# Patient Record
Sex: MALE | Race: WHITE | Employment: OTHER | URBAN - METROPOLITAN AREA
[De-identification: names, ages, dates, MRNs, and addresses within clinical notes are randomized per-mention and may not be internally consistent; named-entity substitution may affect disease eponyms.]

---

## 2017-02-03 ENCOUNTER — ANTICOAGULATION THERAPY VISIT (OUTPATIENT)
Dept: NURSING | Facility: CLINIC | Age: 52
End: 2017-02-03
Payer: COMMERCIAL

## 2017-02-03 DIAGNOSIS — Z79.01 LONG-TERM (CURRENT) USE OF ANTICOAGULANTS: Primary | ICD-10-CM

## 2017-02-03 LAB — INR POINT OF CARE: 2.5 (ref 0.86–1.14)

## 2017-02-03 PROCEDURE — 85610 PROTHROMBIN TIME: CPT | Mod: QW

## 2017-02-03 PROCEDURE — 99207 ZZC NO CHARGE NURSE ONLY: CPT

## 2017-02-03 PROCEDURE — 36416 COLLJ CAPILLARY BLOOD SPEC: CPT

## 2017-03-17 ENCOUNTER — ANTICOAGULATION THERAPY VISIT (OUTPATIENT)
Dept: NURSING | Facility: CLINIC | Age: 52
End: 2017-03-17
Payer: COMMERCIAL

## 2017-03-17 DIAGNOSIS — Z79.01 LONG-TERM (CURRENT) USE OF ANTICOAGULANTS: ICD-10-CM

## 2017-03-17 LAB — INR POINT OF CARE: 2.8 (ref 0.86–1.14)

## 2017-03-17 PROCEDURE — 36416 COLLJ CAPILLARY BLOOD SPEC: CPT

## 2017-03-17 PROCEDURE — 85610 PROTHROMBIN TIME: CPT | Mod: QW

## 2017-03-17 NOTE — PROGRESS NOTES
ANTICOAGULATION FOLLOW-UP CLINIC VISIT    Patient Name:  Barry Herrera  Date:  3/17/2017  Contact Type:  Face to Face    SUBJECTIVE:     Patient Findings     Positives No Problem Findings           OBJECTIVE    INR Protime   Date Value Ref Range Status   03/17/2017 2.8 (A) 0.86 - 1.14 Final       ASSESSMENT / PLAN  INR assessment THER    Recheck INR In: 6 WEEKS    INR Location Clinic      Anticoagulation Summary as of 3/17/2017     INR goal 2.0-3.0   Today's INR 2.8   Maintenance plan 10 mg (5 mg x 2) on Mon; 7.5 mg (5 mg x 1.5) all other days   Full instructions 10 mg on Mon; 7.5 mg all other days   Weekly total 55 mg   No change documented Lois Claudio RN   Plan last modified Lois Claudio RN (9/7/2016)   Next INR check 4/28/2017   Target end date     Indications   Long-term (current) use of anticoagulants [Z79.01] [Z79.01]  DVT (deep venous thrombosis) (H) (Resolved) [I82.409]         Anticoagulation Episode Summary     INR check location [facility]    Preferred lab     Send INR reminders to [facility]    Comments       Anticoagulation Care Providers     Provider Role Specialty Phone number    Josette Sarmiento MD  Family Practice [phone]            See the Encounter Report to view Anticoagulation Flowsheet and Dosing Calendar (Go to Encounters tab in chart review, and find the Anticoagulation Therapy Visit)    Dosage adjustment made based on physician directed care plan.    2.8 today.  Continue with 10 mg on Mon;  7.5 mg all other days.  Recheck in 6 weeks.     Lois Claudio RN

## 2017-03-17 NOTE — MR AVS SNAPSHOT
Barry BARRERA Javier   3/17/2017 8:15 AM   Anticoagulation Therapy Visit    Description:  51 year old male   Provider:   ANTICOAGULATION CLINIC   Department:  Ec Nurse           INR as of 3/17/2017     Today's INR 2.8      Anticoagulation Summary as of 3/17/2017     INR goal 2.0-3.0   Today's INR 2.8   Full instructions 10 mg on Mon; 7.5 mg all other days   Next INR check 4/28/2017    Indications   Long-term (current) use of anticoagulants [Z79.01] [Z79.01]  DVT (deep venous thrombosis) (H) (Resolved) [I82.409]         Description     2.8 today.  Continue with 10 mg on Mon;  7.5 mg all other days.  Recheck in 6 weeks.         Your next Anticoagulation Clinic appointment(s)     Apr 28, 2017  8:15 AM CDT   Anticoagulation Visit with  ANTICOAGULATION CLINIC   [facility] ([facility])    [address] [place] [id number] [phone]              Contact Numbers     Clinic Number:         March 2017 Details    Sun Mon Tue Wed Thu Fri Sat        1               2               3               4                 5               6               7               8               9               10               11                 12               13               14               15               16               17      7.5 mg   See details      18      7.5 mg           19      7.5 mg         20      10 mg         21      7.5 mg         22      7.5 mg         23      7.5 mg         24      7.5 mg         25      7.5 mg           26      7.5 mg         27      10 mg         28      7.5 mg         29      7.5 mg         30      7.5 mg         31      7.5 mg           Date Details   03/17 This INR check               How to take your warfarin dose     To take:  7.5 mg Take 1.5 of the 5 mg tablets.    To take:  10 mg Take 2 of the 5 mg tablets.           April 2017 Details    Sun Mon Tue Wed Thu Fri Sat           1      7.5 mg           2      7.5 mg         3      10  mg         4      7.5 mg         5      7.5 mg         6      7.5 mg         7      7.5 mg         8      7.5 mg           9      7.5 mg         10      10 mg         11      7.5 mg         12      7.5 mg         13      7.5 mg         14      7.5 mg         15      7.5 mg           16      7.5 mg         17      10 mg         18      7.5 mg         19      7.5 mg         20      7.5 mg         21      7.5 mg         22      7.5 mg           23      7.5 mg         24      10 mg         25      7.5 mg         26      7.5 mg         27      7.5 mg         28            29                 30                      Date Details   No additional details    Date of next INR:  4/28/2017         How to take your warfarin dose     To take:  7.5 mg Take 1.5 of the 5 mg tablets.    To take:  10 mg Take 2 of the 5 mg tablets.

## 2017-04-18 DIAGNOSIS — I82.492 DEEP VEIN THROMBOSIS (DVT) OF OTHER VEIN OF LEFT LOWER EXTREMITY: ICD-10-CM

## 2017-04-18 DIAGNOSIS — Z79.01 LONG-TERM (CURRENT) USE OF ANTICOAGULANTS: ICD-10-CM

## 2017-04-18 RX ORDER — WARFARIN SODIUM 5 MG/1
TABLET ORAL
Qty: 168 TABLET | Refills: 0 | Status: SHIPPED | OUTPATIENT
Start: 2017-04-18 | End: 2017-07-18

## 2017-04-18 NOTE — TELEPHONE ENCOUNTER
JANTOVEN 5 MG    Last Written Prescription Date: 7/27/16  Last Fill Qty: 168, # refills: 0  Last Office Visit with [facility] or [facility] prescribing provider: 7/27/16       Date and Result of Last PT/INR:   Lab Results   Component Value Date    INR 2.8 03/17/2017    INR 2.5 02/03/2017    INR 0.96 02/01/2011    INR 0.99 01/31/2011

## 2017-04-27 ENCOUNTER — OFFICE VISIT (OUTPATIENT)
Dept: FAMILY MEDICINE | Facility: CLINIC | Age: 52
End: 2017-04-27
Payer: COMMERCIAL

## 2017-04-27 VITALS
HEART RATE: 95 BPM | OXYGEN SATURATION: 97 % | TEMPERATURE: 99.6 F | HEIGHT: 72 IN | WEIGHT: 211.4 LBS | BODY MASS INDEX: 28.63 KG/M2 | DIASTOLIC BLOOD PRESSURE: 78 MMHG | SYSTOLIC BLOOD PRESSURE: 112 MMHG

## 2017-04-27 DIAGNOSIS — R05.9 COUGH: ICD-10-CM

## 2017-04-27 DIAGNOSIS — J02.9 SORE THROAT: Primary | ICD-10-CM

## 2017-04-27 LAB
DEPRECATED S PYO AG THROAT QL EIA: NORMAL
MICRO REPORT STATUS: NORMAL
SPECIMEN SOURCE: NORMAL

## 2017-04-27 PROCEDURE — 87880 STREP A ASSAY W/OPTIC: CPT | Performed by: FAMILY MEDICINE

## 2017-04-27 PROCEDURE — 87081 CULTURE SCREEN ONLY: CPT | Performed by: FAMILY MEDICINE

## 2017-04-27 PROCEDURE — 99213 OFFICE O/P EST LOW 20 MIN: CPT | Performed by: FAMILY MEDICINE

## 2017-04-27 RX ORDER — BENZONATATE 200 MG/1
200 CAPSULE ORAL 3 TIMES DAILY PRN
Qty: 21 CAPSULE | Refills: 0 | Status: SHIPPED | OUTPATIENT
Start: 2017-04-27 | End: 2017-05-10

## 2017-04-27 NOTE — MR AVS SNAPSHOT
After Visit Summary   4/27/2017    Barry Herrera    MRN: [id number]           Patient Information     Date Of Birth          1965        Visit Information        Provider Department      4/27/2017 11:00 AM Josette Sarmiento MD [facility]        Today's Diagnoses     Sore throat    -  1    Cough           Follow-ups after your visit        Your next 10 appointments already scheduled     Apr 28, 2017  8:15 AM CDT   Anticoagulation Visit with [facility]UL[facility] CLINIC   [facility] ([facility])    [address] [place] [id number]   [phone]              Who to contact     If you have questions or need follow up information about today's clinic visit or your schedule please contact [facility] directly at [phone].  Normal or non-critical lab and imaging results will be communicated to you by MyChart, letter or phone within 4 business days after the clinic has received the results. If you do not hear from us within 7 days, please contact the clinic through MyChart or phone. If you have a critical or abnormal lab result, we will notify you by phone as soon as possible.  Submit refill requests through [email or web address] or call your pharmacy and they will forward the refill request to us. Please allow 3 business days for your refill to be completed.          Additional Information About Your Visit        MyChart Information     [email or web address] gives you secure access to your electronic health record. If you see a primary care provider, you can also send messages to your care team and make appointments. If you have questions, please call your primary care clinic.  If you do not have a primary care provider, please call [phone] and they will assist you.        Care EveryWhere ID     This is your Care EveryWhere ID. This could be used by other organizations to access your [facility] medical records  [id number]         Your Vitals Were     Pulse Temperature Height Pulse Oximetry BMI (Body Mass Index)       95 99.6  F (37.6  C) (Tympanic) 6' (1.829 m) 97% 28.67 kg/m2        Blood Pressure from Last 3 Encounters:   04/27/17 112/78   07/27/16 120/74   07/22/15 106/66    Weight from Last 3 Encounters:   04/27/17 211 lb 6.4 oz (95.9 kg)   07/27/16 206 lb (93.4 kg)   07/22/15 207 lb (93.9 kg)              We Performed the Following     Beta strep group A culture     Rapid strep screen          Today's Medication Changes          These changes are accurate as of: 4/27/17 11:35 AM.  If you have any questions, ask your nurse or doctor.               Start taking these medicines.        Dose/Directions    benzonatate 200 MG capsule   Commonly known as:  TESSALON   Used for:  Cough   Started by:  Josette Sarmiento MD        Dose:  200 mg   Take 1 capsule (200 mg) by mouth 3 times daily as needed for cough   Quantity:  21 capsule   Refills:  0            Where to get your medicines      These medications were sent to [id number] - [place] - [address] AT [facility] OF [facility] [address]  [address] [facility] [address]     Phone:  [phone]     benzonatate 200 MG capsule                Primary Care Provider Office Phone # Fax #    Josette Sarmiento [phone]       [facility] [address]  [place] [address]        Thank you!     Thank you for choosing [facility]  for your care. Our goal is always to provide you with excellent care. Hearing back from our patients is one way we can continue to improve our services. Please take a few minutes to complete the written survey that you may receive in the mail after your visit with us. Thank you!             Your Updated Medication List - Protect others around you: Learn how to safely use, store and throw away your medicines at www.disposemymeds.org.          This list is accurate as  of: 4/27/17 11:35 AM.  Always use your most recent med list.                   Brand Name Dispense Instructions for use    benzonatate 200 MG capsule    TESSALON    21 capsule    Take 1 capsule (200 mg) by mouth 3 times daily as needed for cough       JANTOVEN 5 MG tablet   Generic drug:  warfarin     168 tablet    TAKE 2 TABLETS BY MOUTH ON MONDAYS AND THURSDAYS, AND TAKE 1 1/2 TABLETS ALL OTHER DAYS AS DIRECTED

## 2017-04-27 NOTE — PROGRESS NOTES
SUBJECTIVE:                                                    Barry Herrera is a 51 year old male who presents to clinic today for the following health issues:      Acute Illness   Acute illness concerns: Cold sx's  Onset: 4 days    Fever: YES- 100-102    Chills/Sweats: YES    Headache (location?): YES    Sinus Pressure:YES    Conjunctivitis:  no    Ear Pain: no    Rhinorrhea: no     Congestion: YES    Sore Throat: YES     Cough: YES    Wheeze: no     Decreased Appetite: YES    Nausea: no    Vomiting: no    Diarrhea:  no    Dysuria/Freq.: no    Fatigue/Achiness: YES    Sick/Strep Exposure: no      Therapies Tried and outcome: OTC meds        Problem list and histories reviewed & adjusted, as indicated.  Additional history: as documented    Patient Active Problem List   Diagnosis     Diverticulosis of large intestine     HORSESHOE KIDNEY     Premature ejaculation     CARDIOVASCULAR SCREENING; LDL GOAL LESS THAN 160     Health Care Home     Long-term (current) use of anticoagulants [Z79.01]     Deep vein thrombosis (DVT) of other vein of left lower extremity (H)     Past Surgical History:   Procedure Laterality Date     C APPENDECTOMY  7/77     HC DUPLEX EXTREMITY VENOUS, LIMITED UNILATERAL  3/2009    left occlusive thrombus groin to calf involving superficial femoral vein, popliteal veins as well as proximal peroneal and post tibial calf veins       Social History   Substance Use Topics     Smoking status: Never Smoker     Smokeless tobacco: Never Used     Alcohol use No      Comment: approx 2 drinks a month     Family History   Problem Relation Age of Onset     Blood Disease Brother      Factor V Leiden neg     Cardiovascular Brother      two DVT     Breast Cancer Mother      Aneurysm Father      brain - stroke         Current Outpatient Prescriptions   Medication Sig Dispense Refill     benzonatate (TESSALON) 200 MG capsule Take 1 capsule (200 mg) by mouth 3 times daily as needed for cough 21 capsule 0      JANTOVEN 5 MG tablet TAKE 2 TABLETS BY MOUTH ON MONDAYS AND THURSDAYS, AND TAKE 1 1/2 TABLETS ALL OTHER DAYS AS DIRECTED 168 tablet 0     Allergies   Allergen Reactions     No Known Drug Allergies        Reviewed and updated as needed this visit by clinical staff       Reviewed and updated as needed this visit by Provider         ROS:  INTEGUMENTARY/SKIN: NEGATIVE for worrisome rashes, moles or lesions  GI: NEGATIVE for nausea, abdominal pain, heartburn, or change in bowel habits    OBJECTIVE:                                                    /78 (BP Location: Right arm, Cuff Size: Adult Regular)  Pulse 95  Temp 99.6  F (37.6  C) (Tympanic)  Ht 6' (1.829 m)  Wt 211 lb 6.4 oz (95.9 kg)  SpO2 97%  BMI 28.67 kg/m2  Body mass index is 28.67 kg/(m^2).   GENERAL: healthy, alert, well nourished, well hydrated, no distress  HENT: ear canals- normal; TMs- normal; Nose- normal; Mouth- no ulcers, no lesions  NECK: no tenderness, no adenopathy, no asymmetry, no masses, no stiffness; thyroid- normal to palpation  RESP: lungs clear to auscultation - no rales, no rhonchi, no wheezes  CV: regular rates and rhythm, normal S1 S2, no S3 or S4 and no murmur, no click or rub -  ABDOMEN: soft, no tenderness, no  hepatosplenomegaly, no masses, normal bowel sounds    Results for orders placed or performed in visit on 04/27/17   Rapid strep screen   Result Value Ref Range    Specimen Description Throat     Rapid Strep A Screen       NEGATIVE: No Group A streptococcal antigen detected by immunoassay, await   culture report.      Micro Report Status FINAL 04/27/2017           ASSESSMENT/PLAN:                                                        ICD-10-CM    1. Sore throat J02.9 Rapid strep screen     Beta strep group A culture   2. Cough R05 benzonatate (TESSALON) 200 MG capsule     Rapid strep screening is negative. No signs of bacterial infection. Recommended to start antihistamine daily and Mucinex over-the-counter. If any  symptomatic worsening noted, chart to notify me back. Stay well hydrated. Tylenol prn fever or pain. Cough medication ordered  Follow up with Provider - as needed     Josette Sarmiento MD  [facility]

## 2017-04-27 NOTE — NURSING NOTE
Chief Complaint   Patient presents with     URI       Initial /78 (BP Location: Right arm, Cuff Size: Adult Regular)  Pulse 95  Temp 99.6  F (37.6  C) (Tympanic)  Ht 6' (1.829 m)  Wt 211 lb 6.4 oz (95.9 kg)  SpO2 97%  BMI 28.67 kg/m2 Estimated body mass index is 28.67 kg/(m^2) as calculated from the following:    Height as of this encounter: 6' (1.829 m).    Weight as of this encounter: 211 lb 6.4 oz (95.9 kg).  Medication Reconciliation: complete

## 2017-04-28 ENCOUNTER — ANTICOAGULATION THERAPY VISIT (OUTPATIENT)
Dept: NURSING | Facility: CLINIC | Age: 52
End: 2017-04-28
Payer: COMMERCIAL

## 2017-04-28 DIAGNOSIS — Z79.01 LONG-TERM (CURRENT) USE OF ANTICOAGULANTS: ICD-10-CM

## 2017-04-28 LAB
BACTERIA SPEC CULT: NORMAL
INR POINT OF CARE: 4.4 (ref 0.86–1.14)
MICRO REPORT STATUS: NORMAL
SPECIMEN SOURCE: NORMAL

## 2017-04-28 PROCEDURE — 85610 PROTHROMBIN TIME: CPT | Mod: QW

## 2017-04-28 PROCEDURE — 36416 COLLJ CAPILLARY BLOOD SPEC: CPT

## 2017-04-28 NOTE — PROGRESS NOTES
ANTICOAGULATION FOLLOW-UP CLINIC VISIT    Patient Name:  Barry Herrera  Date:  4/28/2017  Contact Type:  Face to Face    SUBJECTIVE:     Patient Findings     Positives Antibiotic use or infection    Comments Cold and infection            OBJECTIVE    INR Protime   Date Value Ref Range Status   04/28/2017 4.4 (A) 0.86 - 1.14 Final       ASSESSMENT / PLAN  INR assessment SUPRA    Recheck INR In: 2 WEEKS    INR Location Clinic      Anticoagulation Summary as of 4/28/2017     INR goal 2.0-3.0   Today's INR 4.4!   Maintenance plan 10 mg (5 mg x 2) on Mon; 7.5 mg (5 mg x 1.5) all other days   Full instructions 4/28: Hold; Otherwise 10 mg on Mon; 7.5 mg all other days   Weekly total 55 mg   Plan last modified Lois Claudio RN (9/7/2016)   Next INR check 5/12/2017   Target end date     Indications   Long-term (current) use of anticoagulants [Z79.01] [Z79.01]  DVT (deep venous thrombosis) (H) (Resolved) [I82.409]         Anticoagulation Episode Summary     INR check location [facility]    Preferred lab     Send INR reminders to [facility]    Comments       Anticoagulation Care Providers     Provider Role Specialty Phone number    Josette Sarmiento MD  Family Practice [phone]            See the Encounter Report to view Anticoagulation Flowsheet and Dosing Calendar (Go to Encounters tab in chart review, and find the Anticoagulation Therapy Visit)    Dosage adjustment made based on physician directed care plan.    4.4 today due to cold and infection plus not eating well the past week.  Advised to hold today 4/28/17, resume regular diet.  Otherwise continue with 10 mg on Mon;  7.5 mg all other days.  Recheck in 2 weeks.     Lois Claudio RN

## 2017-04-28 NOTE — MR AVS SNAPSHOT
Barry BRUCE Herrera   4/28/2017 8:15 AM   Anticoagulation Therapy Visit    Description:  51 year old male   Provider:   ANTICOAGULATION CLINIC   Department:  Ec Nurse           INR as of 4/28/2017     Today's INR 4.4!      Anticoagulation Summary as of 4/28/2017     INR goal 2.0-3.0   Today's INR 4.4!   Full instructions 4/28: Hold; Otherwise 10 mg on Mon; 7.5 mg all other days   Next INR check 5/12/2017    Indications   Long-term (current) use of anticoagulants [Z79.01] [Z79.01]  DVT (deep venous thrombosis) (H) (Resolved) [I82.409]         Description     4.4 today due to cold and infection plus not eating well the past week.  Advised to hold today 4/28/17, resume regular diet.  Otherwise continue with 10 mg on Mon;  7.5 mg all other days.  Recheck in 2 weeks.         Your next Anticoagulation Clinic appointment(s)     May 12, 2017  8:15 AM CDT   Anticoagulation Visit with  ANTICOAGULATION CLINIC   [facility] [place] [address]   [phone]              Contact Numbers     Clinic Number:         April 2017 Details    Sun Mon Tue Wed Thu Fri Sat           1                 2               3               4               5               6               7               8                 9               10               11               12               13               14               15                 16               17               18               19               20               21               22                 23               24               25               26               27               28      Hold   See details      29      7.5 mg           30      7.5 mg                Date Details   04/28 This INR check               How to take your warfarin dose     To take:  7.5 mg Take 1.5 of the 5 mg tablets.    Hold Do not take your warfarin dose. See the Details table to the right for additional instructions.                 May 2017 Details    Sun Mon Tue Wed Thu Fri Sat      1      10 mg         2      7.5 mg         3      7.5 mg         4      7.5 mg         5      7.5 mg         6      7.5 mg           7      7.5 mg         8      10 mg         9      7.5 mg         10      7.5 mg         11      7.5 mg         12            13                 14               15               16               17               18               19               20                 21               22               23               24               25               26               27                 28               29               30               31                   Date Details   No additional details    Date of next INR:  5/12/2017         How to take your warfarin dose     To take:  7.5 mg Take 1.5 of the 5 mg tablets.    To take:  10 mg Take 2 of the 5 mg tablets.

## 2017-05-10 ENCOUNTER — OFFICE VISIT (OUTPATIENT)
Dept: FAMILY MEDICINE | Facility: CLINIC | Age: 52
End: 2017-05-10
Payer: COMMERCIAL

## 2017-05-10 VITALS
BODY MASS INDEX: 27.5 KG/M2 | TEMPERATURE: 98 F | HEART RATE: 98 BPM | DIASTOLIC BLOOD PRESSURE: 70 MMHG | HEIGHT: 72 IN | SYSTOLIC BLOOD PRESSURE: 122 MMHG | WEIGHT: 203 LBS | OXYGEN SATURATION: 95 %

## 2017-05-10 DIAGNOSIS — J01.90 ACUTE SINUSITIS WITH SYMPTOMS > 10 DAYS: Primary | ICD-10-CM

## 2017-05-10 PROCEDURE — 99213 OFFICE O/P EST LOW 20 MIN: CPT | Performed by: FAMILY MEDICINE

## 2017-05-10 NOTE — NURSING NOTE
Chief Complaint   Patient presents with     Cough       Initial /70 (BP Location: Right arm, Patient Position: Chair, Cuff Size: Adult Large)  Pulse 98  Temp 98  F (36.7  C) (Tympanic)  Ht 6' (1.829 m)  Wt 203 lb (92.1 kg)  SpO2 95%  BMI 27.53 kg/m2 Estimated body mass index is 27.53 kg/(m^2) as calculated from the following:    Height as of this encounter: 6' (1.829 m).    Weight as of this encounter: 203 lb (92.1 kg).  Medication Reconciliation: complete     Violetta Topete CMA

## 2017-05-10 NOTE — MR AVS SNAPSHOT
After Visit Summary   5/10/2017    Barry Herrera    MRN: [id number]           Patient Information     Date Of Birth          1965        Visit Information        Provider Department      5/10/2017 11:00 AM Josette Sarmiento MD [facility]        Today's Diagnoses     Acute sinusitis with symptoms > 10 days    -  1       Follow-ups after your visit        Your next 10 appointments already scheduled     May 12, 2017  8:15 AM CDT   Anticoagulation Visit with [facility]ICOAGULATION CLINIC   [facility]    [address] [place] [address]   [phone]              Who to contact     If you have questions or need follow up information about today's clinic visit or your schedule please contact [facility] directly at [phone].  Normal or non-critical lab and imaging results will be communicated to you by MyChart, letter or phone within 4 business days after the clinic has received the results. If you do not hear from us within 7 days, please contact the clinic through MyChart or phone. If you have a critical or abnormal lab result, we will notify you by phone as soon as possible.  Submit refill requests through t-Art or call your pharmacy and they will forward the refill request to us. Please allow 3 business days for your refill to be completed.          Additional Information About Your Visit        MyChart Information     [employer] gives you secure access to your electronic health record. If you see a primary care provider, you can also send messages to your care team and make appointments. If you have questions, please call your primary care clinic.  If you do not have a primary care provider, please call [phone] and they will assist you.        Care EveryWhere ID     This is your Care EveryWhere ID. This could be used by other organizations to access your [facility]  records  [id number]        Your Vitals Were     Pulse Temperature Height Pulse Oximetry BMI (Body Mass Index)       98 98  F (36.7  C) (Tympanic) 6' (1.829 m) 95% 27.53 kg/m2        Blood Pressure from Last 3 Encounters:   05/10/17 122/70   04/27/17 112/78   07/27/16 120/74    Weight from Last 3 Encounters:   05/10/17 203 lb (92.1 kg)   04/27/17 211 lb 6.4 oz (95.9 kg)   07/27/16 206 lb (93.4 kg)              Today, you had the following     No orders found for display         Today's Medication Changes          These changes are accurate as of: 5/10/17 11:14 AM.  If you have any questions, ask your nurse or doctor.               Start taking these medicines.        Dose/Directions    amoxicillin-clavulanate 875-125 MG per tablet   Commonly known as:  AUGMENTIN   Used for:  Acute sinusitis with symptoms > 10 days   Started by:  Josette Sarmiento MD        Dose:  1 tablet   Take 1 tablet by mouth 2 times daily for 7 days   Quantity:  14 tablet   Refills:  0            Where to get your medicines      These medications were sent to [id number] - [place] - [address] AT [facility] OF [facility] [address]  [address] [facility] [place], [place] [address]     Phone:  [phone]     amoxicillin-clavulanate 875-125 MG per tablet                Primary Care Provider Office Phone # Fax #    Josette Sarmiento [phone]       [facility] [place] [address] [place] [address] [address]  [place] [address]        Thank you!     [facility] [place]  [facility] your [facility]. Our goal is always to provide you with excellent care. Hearing back from our patients is one way we can continue to improve our services. Please take a few minutes to complete the written survey that you may receive in the mail after your visit with us. Thank you!             Your Updated Medication List - Protect others around you: Learn how to safely use, store and throw away your medicines at  www.disposemymeds.org.          This list is accurate as of: 5/10/17 11:14 AM.  Always use your most recent med list.                   Brand Name Dispense Instructions for use    amoxicillin-clavulanate 875-125 MG per tablet    AUGMENTIN    14 tablet    Take 1 tablet by mouth 2 times daily for 7 days       JANTOVEN 5 MG tablet   Generic drug:  warfarin     168 tablet    TAKE 2 TABLETS BY MOUTH ON MONDAYS AND THURSDAYS, AND TAKE 1 1/2 TABLETS ALL OTHER DAYS AS DIRECTED

## 2017-05-10 NOTE — PROGRESS NOTES
SUBJECTIVE:                                                    Barry Herrera is a 51 year old male who presents to clinic today for the following health issues:      Acute Illness   Acute illness concerns: URI  Onset: x 2 weeks    Fever: YES- 100.5    Chills/Sweats: YES    Headache (location?): YES    Sinus Pressure:YES    Conjunctivitis:  no    Ear Pain: no    Rhinorrhea: YES    Congestion: no    Sore Throat: no     Cough: YES-non-productive, productive of yellow sputum, productive of green sputum    Wheeze: no    Decreased Appetite: no    Nausea: no    Vomiting: no    Diarrhea:  no    Dysuria/Freq.: no    Fatigue/Achiness: YES    Sick/Strep Exposure: no     Therapies Tried and outcome: Tylenol used this morning.           Problem list and histories reviewed & adjusted, as indicated.  Additional history: as documented    Patient Active Problem List   Diagnosis     Diverticulosis of large intestine     HORSESHOE KIDNEY     Premature ejaculation     CARDIOVASCULAR SCREENING; LDL GOAL LESS THAN 160     Health Care Home     Long-term (current) use of anticoagulants [Z79.01]     Deep vein thrombosis (DVT) of other vein of left lower extremity (H)     Past Surgical History:   Procedure Laterality Date     C APPENDECTOMY  7/77     HC DUPLEX EXTREMITY VENOUS, LIMITED UNILATERAL  3/2009    left occlusive thrombus groin to calf involving superficial femoral vein, popliteal veins as well as proximal peroneal and post tibial calf veins       Social History   Substance Use Topics     Smoking status: Never Smoker     Smokeless tobacco: Never Used     Alcohol use No      Comment: approx 2 drinks a month     Family History   Problem Relation Age of Onset     Blood Disease Brother      Factor V Leiden neg     Cardiovascular Brother      two DVT     Breast Cancer Mother      Aneurysm Father      brain - stroke         Current Outpatient Prescriptions   Medication Sig Dispense Refill     amoxicillin-clavulanate (AUGMENTIN)  875-125 MG per tablet Take 1 tablet by mouth 2 times daily for 7 days 14 tablet 0     JANTOVEN 5 MG tablet TAKE 2 TABLETS BY MOUTH ON MONDAYS AND THURSDAYS, AND TAKE 1 1/2 TABLETS ALL OTHER DAYS AS DIRECTED 168 tablet 0     Allergies   Allergen Reactions     No Known Drug Allergies        Reviewed and updated as needed this visit by clinical staff       Reviewed and updated as needed this visit by Provider         ROS:  INTEGUMENTARY/SKIN: NEGATIVE for worrisome rashes, moles or lesions  GI: NEGATIVE for nausea, abdominal pain, heartburn, or change in bowel habits    OBJECTIVE:                                                    /70 (BP Location: Right arm, Patient Position: Chair, Cuff Size: Adult Large)  Pulse 98  Temp 98  F (36.7  C) (Tympanic)  Ht 6' (1.829 m)  Wt 203 lb (92.1 kg)  SpO2 95%  BMI 27.53 kg/m2  Body mass index is 27.53 kg/(m^2).   GENERAL: healthy, alert, well nourished, well hydrated, no distress  HENT: ear canals- normal; TMs- normal; Nose- congested b/l. Sinus tenderness on the left. ; Mouth- no ulcers, no lesions  NECK: no tenderness, no adenopathy, no asymmetry, no masses, no stiffness; thyroid- normal to palpation  RESP: lungs clear to auscultation - no rales, no rhonchi, no wheezes  CV: regular rates and rhythm, normal S1 S2, no S3 or S4 and no murmur, no click or rub -         ASSESSMENT/PLAN:                                                        ICD-10-CM    1. Acute sinusitis with symptoms > 10 days J01.90 amoxicillin-clavulanate (AUGMENTIN) 875-125 MG per tablet     Starting the patient on Augmentin.   Recommended to stay well hydrated.   May also use OTC cough medication and anti-histamine.  Follow up with Provider - if not improving in 5-7 days.      Josette Sarmiento MD  [facility] [place]

## 2017-05-12 ENCOUNTER — ANTICOAGULATION THERAPY VISIT (OUTPATIENT)
Dept: NURSING | Facility: CLINIC | Age: 52
End: 2017-05-12
Payer: COMMERCIAL

## 2017-05-12 DIAGNOSIS — Z79.01 LONG-TERM (CURRENT) USE OF ANTICOAGULANTS: ICD-10-CM

## 2017-05-12 LAB — INR POINT OF CARE: 5.4 (ref 0.86–1.14)

## 2017-05-12 PROCEDURE — 85610 PROTHROMBIN TIME: CPT | Mod: QW

## 2017-05-12 PROCEDURE — 36416 COLLJ CAPILLARY BLOOD SPEC: CPT

## 2017-05-12 NOTE — PROGRESS NOTES
ANTICOAGULATION FOLLOW-UP CLINIC VISIT    Patient Name:  Barry Herrera  Date:  5/12/2017  Contact Type:  Face to Face    SUBJECTIVE:     Patient Findings     Positives Antibiotic use or infection    Comments Cold, sinus infection and on abx            OBJECTIVE    INR Protime   Date Value Ref Range Status   05/12/2017 5.4 (A) 0.86 - 1.14 Final       ASSESSMENT / PLAN  INR assessment SUPRA    Recheck INR In: 5 DAYS    INR Location Clinic      Anticoagulation Summary as of 5/12/2017     INR goal 2.0-3.0   Today's INR 5.4!   Maintenance plan 10 mg (5 mg x 2) on Mon; 7.5 mg (5 mg x 1.5) all other days   Full instructions 5/12: Hold; 5/13: 5 mg; 5/15: 7.5 mg; Otherwise 10 mg on Mon; 7.5 mg all other days   Weekly total 55 mg   Plan last modified Lois Claudio RN (9/7/2016)   Next INR check 5/18/2017   Target end date     Indications   Long-term (current) use of anticoagulants [Z79.01] [Z79.01]  DVT (deep venous thrombosis) (H) (Resolved) [I82.409]         Anticoagulation Episode Summary     [facility]    Preferred lab     Send INR reminders to [facility]    Comments       Anticoagulation Care Providers     Provider Role Specialty Phone number    Josette Sarmiento MD  Family Practice [phone]            See the Encounter Report to view Anticoagulation Flowsheet and Dosing Calendar (Go to Encounters tab in chart review, and find the Anticoagulation Therapy Visit)    Dosage adjustment made based on physician directed care plan.    5.4 today.  Hold today.  Take 5 mg on Sat;  7.5 all other days.  Recheck in 1 week.     Lois Claudio, COTY

## 2017-05-12 NOTE — MR AVS SNAPSHOT
Barry BARRERA Javier   5/12/2017 8:15 AM   Anticoagulation Therapy Visit    Description:  51 year old male   Provider:   ANTICOAGULATION CLINIC   Department:  Ec Nurse           INR as of 5/12/2017     Today's INR 5.4!      Anticoagulation Summary as of 5/12/2017     INR goal 2.0-3.0   Today's INR 5.4!   Full instructions 5/12: Hold; 5/13: 5 mg; 5/15: 7.5 mg; Otherwise 10 mg on Mon; 7.5 mg all other days   Next INR check 5/18/2017    Indications   Long-term (current) use of anticoagulants [Z79.01] [Z79.01]  DVT (deep venous thrombosis) (H) (Resolved) [I82.409]         Description     5.4 today.  Hold today.  Take 5 mg on Sat;  7.5 all other days.  Recheck in 1 week.         Your next Anticoagulation Clinic appointment(s)     May 18, 2017  8:15 AM CDT   [facility]ticoagulation Visit with [facility]   [facility] ([facility]    [address] [place] [id number]   [phone]              Contact Numbers     Clinic Number:         May 2017 Details    Sun Mon Tue Wed Thu Fri Sat      1               2               3               4               5               6                 7               8               9               10               11               12      Hold   See details      13      5 mg           14      7.5 mg         15      7.5 mg         16      7.5 mg         17      7.5 mg         18            19               20                 21               22               23               24               25               26               27                 28               29               30               31                   Date Details   05/12 This INR check       Date of next INR:  5/18/2017         How to take your warfarin dose     To take:  5 mg Take 1 of the 5 mg tablets.    To take:  7.5 mg Take 1.5 of the 5 mg tablets.    Hold Do not take your warfarin dose. See the Details table to the right for additional instructions.

## 2017-05-18 ENCOUNTER — ANTICOAGULATION THERAPY VISIT (OUTPATIENT)
Dept: NURSING | Facility: CLINIC | Age: 52
End: 2017-05-18
Payer: COMMERCIAL

## 2017-05-18 DIAGNOSIS — Z79.01 LONG-TERM (CURRENT) USE OF ANTICOAGULANTS: ICD-10-CM

## 2017-05-18 LAB — INR POINT OF CARE: 2.6 (ref 0.86–1.14)

## 2017-05-18 PROCEDURE — 85610 PROTHROMBIN TIME: CPT | Mod: QW

## 2017-05-18 PROCEDURE — 36416 COLLJ CAPILLARY BLOOD SPEC: CPT

## 2017-05-18 NOTE — MR AVS SNAPSHOT
Barry Herrera   5/18/2017 8:15 AM   Anticoagulation Therapy Visit    Description:  51 year old male   Provider:   ANTICOAGULATION CLINIC   Department:  Ec Nurse           INR as of 5/18/2017     Today's INR 2.6      Anticoagulation Summary as of 5/18/2017     INR goal 2.0-3.0   Today's INR 2.6   Full instructions 5/19: 5 mg; 5/21: 5 mg; 5/22: 5 mg; 5/24: 5 mg; Otherwise 10 mg on Mon; 7.5 mg all other days   Next INR check 5/25/2017    Indications   Long-term (current) use of anticoagulants [Z79.01] [Z79.01]  DVT (deep venous thrombosis) (H) (Resolved) [I82.409]         Your next Anticoagulation Clinic appointment(s)     May 25, 2017  8:15 AM CDT   [facility]   [facility]    [address] [place] [id number] [phone]              Contact Numbers     Clinic Number:         May 2017 Details    Sun Mon Tue Wed Thu Fri Sat      1               2               3               4               5               6                 7               8               9               10               11               12               13                 14               15               16               17               18      7.5 mg   See details      19      5 mg         20      7.5 mg           21      5 mg         22      5 mg         23      7.5 mg         24      5 mg         25            26               27                 28               29               30               31                   Date Details   05/18 This INR check       Date of next INR:  5/25/2017         How to take your warfarin dose     To take:  5 mg Take 1 of the 5 mg tablets.    To take:  7.5 mg Take 1.5 of the 5 mg tablets.

## 2017-05-18 NOTE — PROGRESS NOTES
ANTICOAGULATION FOLLOW-UP CLINIC VISIT    Patient Name:  Barry Herrera  Date:  5/18/2017  Contact Type:  Face to Face    SUBJECTIVE:     Patient Findings     Positives Antibiotic use or infection, Intentional hold of therapy    Comments Last dose Amoxicillin 5/17           OBJECTIVE    INR Protime   Date Value Ref Range Status   05/18/2017 2.6 (A) 0.86 - 1.14 Final       ASSESSMENT / PLAN  INR assessment THER    Recheck INR In: 1 WEEK    INR Location Clinic      Anticoagulation Summary as of 5/18/2017     INR goal 2.0-3.0   Today's INR 2.6   Maintenance plan 10 mg (5 mg x 2) on Mon; 7.5 mg (5 mg x 1.5) all other days   Full instructions 5/19: 5 mg; 5/21: 5 mg; 5/22: 5 mg; 5/24: 5 mg; Otherwise 10 mg on Mon; 7.5 mg all other days   Weekly total 55 mg   Plan last modified Lois Claudio RN (9/7/2016)   Next INR check 5/25/2017   Target end date     Indications   Long-term (current) use of anticoagulants [Z79.01] [Z79.01]  DVT (deep venous thrombosis) (H) (Resolved) [I82.409]         Anticoagulation Episode Summary     [facility]    Preferred lab     Send INR reminders to [facility]    Comments       Anticoagulation Care Providers     Provider Role Specialty Phone number    Josette Sarmiento MD  [facility] [phone]            See the Encounter Report to view Anticoagulation Flowsheet and Dosing Calendar (Go to Encounters tab in chart review, and find the Anticoagulation Therapy Visit)    Originally advised to take 7.5 mg TTHSa, 5 mg all other days, recheck one week, huddled with Lucinda, PhD and advised to take 5 mg FM, 7.5 mg all other days and recheck in one week. Notified patient of the change in dosing.      Dosage adjustment made based on physician directed care plan.    Stephanie Lopez RN

## 2017-05-25 ENCOUNTER — ANTICOAGULATION THERAPY VISIT (OUTPATIENT)
Dept: NURSING | Facility: CLINIC | Age: 52
End: 2017-05-25
Payer: COMMERCIAL

## 2017-05-25 DIAGNOSIS — Z79.01 LONG-TERM (CURRENT) USE OF ANTICOAGULANTS: ICD-10-CM

## 2017-05-25 LAB — INR POINT OF CARE: 2.4 (ref 0.86–1.14)

## 2017-05-25 PROCEDURE — 85610 PROTHROMBIN TIME: CPT | Mod: QW

## 2017-05-25 PROCEDURE — 36416 COLLJ CAPILLARY BLOOD SPEC: CPT

## 2017-05-25 NOTE — PROGRESS NOTES
ANTICOAGULATION FOLLOW-UP CLINIC VISIT    Patient Name:  Barry Herrera  Date:  5/25/2017  Contact Type:  Face to Face    SUBJECTIVE:     Patient Findings     Positives No Problem Findings           OBJECTIVE    INR Protime   Date Value Ref Range Status   05/25/2017 2.4 (A) 0.86 - 1.14 Final       ASSESSMENT / PLAN  INR assessment THER    Recheck INR In: 3 WEEKS    INR Location Clinic      Anticoagulation Summary as of 5/25/2017     INR goal 2.0-3.0   Today's INR 2.4   Maintenance plan 10 mg (5 mg x 2) on Mon; 7.5 mg (5 mg x 1.5) all other days   Full instructions 5/25: 5 mg; 5/29: 5 mg; 6/1: 5 mg; 6/5: 5 mg; 6/8: 5 mg; 6/12: 5 mg; 6/15: 5 mg; Otherwise 10 mg on Mon; 7.5 mg all other days   Weekly total 55 mg   Plan last modified Lois Claudio RN (9/7/2016)   Next INR check 6/16/2017   Target end date     Indications   Long-term (current) use of anticoagulants [Z79.01] [Z79.01]  DVT (deep venous thrombosis) (H) (Resolved) [I82.409]         Anticoagulation Episode Summary     [facility]    Preferred lab     Send INR reminders to [facility]    Comments       Anticoagulation Care Providers     Provider Role Specialty Phone number    Josette Sarmiento MD  Family Practice [phone]            See the Encounter Report to view Anticoagulation Flowsheet and Dosing Calendar (Go to Encounters tab in chart review, and find the Anticoagulation Therapy Visit)    Take 5 mg MTH, 7.5 mg all other days, recheck three weeks.     Dosage adjustment made based on physician directed care plan.    Stephanie Lopez RN

## 2017-06-16 ENCOUNTER — ANTICOAGULATION THERAPY VISIT (OUTPATIENT)
Dept: NURSING | Facility: CLINIC | Age: 52
End: 2017-06-16
Payer: COMMERCIAL

## 2017-06-16 DIAGNOSIS — Z79.01 LONG-TERM (CURRENT) USE OF ANTICOAGULANTS: ICD-10-CM

## 2017-06-16 LAB — INR POINT OF CARE: 2 (ref 0.86–1.14)

## 2017-06-16 PROCEDURE — 36416 COLLJ CAPILLARY BLOOD SPEC: CPT

## 2017-06-16 PROCEDURE — 85610 PROTHROMBIN TIME: CPT | Mod: QW

## 2017-06-16 NOTE — MR AVS SNAPSHOT
Barry BARRERA Javier   6/16/2017 8:15 AM   Anticoagulation Therapy Visit    Description:  51 year old male   Provider:   ANTICOAGULATION CLINIC   Department:  Ec Nurse           INR as of 6/16/2017     Today's INR 2.0      Anticoagulation Summary as of 6/16/2017     INR goal 2.0-3.0   Today's INR 2.0   Full instructions 7.5 mg every day   Next INR check 7/7/2017    Indications   Long-term (current) use of anticoagulants [Z79.01] [Z79.01]  DVT (deep venous thrombosis) (H) (Resolved) [I82.409]         Description     INR of 2.0 today.  abx and sinus infection completed and resolved.  Advise to take 7.5 mg daily = 52.5 mg weekly.  Recheck in 3 weeks.      Your next Anticoagulation Clinic appointment(s)     Jul 07, 2017  8:15 AM CDT   Anticoagulation Visit with  ANTICOAGULATION CLINIC   [facility] ([facility]    [address] [place] [id number] [phone]              Contact Numbers     Clinic Number:         June 2017 Details    Sun Mon Tue Wed Thu Fri Sat         1               2               3                 4               5               6               7               8               9               10                 11               12               13               14               15               16      7.5 mg   See details      17      7.5 mg           18      7.5 mg         19      7.5 mg         20      7.5 mg         21      7.5 mg         22      7.5 mg         23      7.5 mg         24      7.5 mg           25      7.5 mg         26      7.5 mg         27      7.5 mg         28      7.5 mg         29      7.5 mg         30      7.5 mg           Date Details   06/16 This INR check               How to take your warfarin dose     To take:  7.5 mg Take 1.5 of the 5 mg tablets.           July 2017 Details    Sun Mon Tue Wed Thu Fri Sat           1      7.5 mg           2      7.5 mg         3      7.5 mg         4      7.5 mg         5       7.5 mg         6      7.5 mg         7            8                 9               10               11               12               13               14               15                 16               17               18               19               20               21               22                 23               24               25               26               27               28               29                 30               31                     Date Details   No additional details    Date of next INR:  7/7/2017         How to take your warfarin dose     To take:  7.5 mg Take 1.5 of the 5 mg tablets.

## 2017-06-16 NOTE — PROGRESS NOTES
ANTICOAGULATION FOLLOW-UP CLINIC VISIT    Patient Name:  Barry Herrera  Date:  6/16/2017  Contact Type:  Face to Face    SUBJECTIVE:     Patient Findings     Positives Antibiotic use or infection    Comments Cold and sinus infection and abx completed           OBJECTIVE    INR Protime   Date Value Ref Range Status   06/16/2017 2.0 (A) 0.86 - 1.14 Final       ASSESSMENT / PLAN  INR assessment THER    Recheck INR In: 3 WEEKS    INR Location Clinic      Anticoagulation Summary as of 6/16/2017     INR goal 2.0-3.0   Today's INR 2.0   Maintenance plan 7.5 mg (5 mg x 1.5) every day   Full instructions 7.5 mg every day   Weekly total 52.5 mg   Plan last modified Lois Claudio RN (6/16/2017)   Next INR check 7/7/2017   Target end date     Indications   Long-term (current) use of anticoagulants [Z79.01] [Z79.01]  DVT (deep venous thrombosis) (H) (Resolved) [I82.409]         Anticoagulation Episode Summary     [facility]    Preferred lab     Send INR reminders to  [facility]    Comments       Anticoagulation Care Providers     Provider Role Specialty Phone number    Josette Sarmiento MD  Family Practice [phone]            See the Encounter Report to view Anticoagulation Flowsheet and Dosing Calendar (Go to Encounters tab in chart review, and find the Anticoagulation Therapy Visit)    Dosage adjustment made based on physician directed care plan.    INR of 2.0 today.  abx and sinus infection completed and resolved.  Advise to take 7.5 mg daily = 52.5 mg weekly.  Recheck in 3 weeks.    Lois Claudio RN

## 2017-07-11 ENCOUNTER — ANTICOAGULATION THERAPY VISIT (OUTPATIENT)
Dept: NURSING | Facility: CLINIC | Age: 52
End: 2017-07-11
Payer: COMMERCIAL

## 2017-07-11 DIAGNOSIS — Z79.01 LONG-TERM (CURRENT) USE OF ANTICOAGULANTS: ICD-10-CM

## 2017-07-11 LAB — INR POINT OF CARE: 2.9 (ref 0.86–1.14)

## 2017-07-11 PROCEDURE — 85610 PROTHROMBIN TIME: CPT | Mod: QW

## 2017-07-11 PROCEDURE — 36416 COLLJ CAPILLARY BLOOD SPEC: CPT

## 2017-07-11 NOTE — MR AVS SNAPSHOT
Barry BARRERA Javier   7/11/2017 11:45 AM   Anticoagulation Therapy Visit    Description:  51 year old male   Provider:   ANTICOAGULATION CLINIC   Department:  Ec Nurse           INR as of 7/11/2017     Today's INR 2.9      Anticoagulation Summary as of 7/11/2017     INR goal 2.0-3.0   Today's INR 2.9   Full instructions 7.5 mg every day   Next INR check 8/25/2017    Indications   Long-term (current) use of anticoagulants [Z79.01] [Z79.01]  DVT (deep venous thrombosis) (H) (Resolved) [I82.409]         Description     INR 2.9 today.  Continue with 7.5 mg daily = 52.5 mg weekly.  Recheck in 6 weeks.         Your next Anticoagulation Clinic appointment(s)     Aug 25, 2017  8:15 AM CDT   Anticoagulation Visit with  ANTICOAGULATION CLINIC   [facility] ([facility]    [address] [place] [id number]   [phone]              Contact Numbers     Clinic Number:         July 2017 Details    Sun Mon Tue Wed Thu Fri Sat           1                 2               3               4               5               6               7               8                 9               10               11      7.5 mg   See details      12      7.5 mg         13      7.5 mg         14      7.5 mg         15      7.5 mg           16      7.5 mg         17      7.5 mg         18      7.5 mg         19      7.5 mg         20      7.5 mg         21      7.5 mg         22      7.5 mg           23      7.5 mg         24      7.5 mg         25      7.5 mg         26      7.5 mg         27      7.5 mg         28      7.5 mg         29      7.5 mg           30      7.5 mg         31      7.5 mg               Date Details   07/11 This INR check               How to take your warfarin dose     To take:  7.5 mg Take 1.5 of the 5 mg tablets.           August 2017 Details    Sun Mon Tue Wed Thu Fri Sat       1      7.5 mg         2      7.5 mg         3      7.5 mg         4      7.5 mg          5      7.5 mg           6      7.5 mg         7      7.5 mg         8      7.5 mg         9      7.5 mg         10      7.5 mg         11      7.5 mg         12      7.5 mg           13      7.5 mg         14      7.5 mg         15      7.5 mg         16      7.5 mg         17      7.5 mg         18      7.5 mg         19      7.5 mg           20      7.5 mg         21      7.5 mg         22      7.5 mg         23      7.5 mg         24      7.5 mg         25            26                 27               28               29               30               31                  Date Details   No additional details    Date of next INR:  8/25/2017         How to take your warfarin dose     To take:  7.5 mg Take 1.5 of the 5 mg tablets.

## 2017-07-11 NOTE — PROGRESS NOTES
ANTICOAGULATION FOLLOW-UP CLINIC VISIT    Patient Name:  Barry Herrera  Date:  7/11/2017  Contact Type:  Face to Face    SUBJECTIVE:     Patient Findings     Positives No Problem Findings           OBJECTIVE    INR Protime   Date Value Ref Range Status   07/11/2017 2.9 (A) 0.86 - 1.14 Final       ASSESSMENT / PLAN  INR assessment THER    Recheck INR In: 6 WEEKS    INR Location Clinic      Anticoagulation Summary as of 7/11/2017     INR goal 2.0-3.0   Today's INR 2.9   Maintenance plan 7.5 mg (5 mg x 1.5) every day   Full instructions 7.5 mg every day   Weekly total 52.5 mg   No change documented Lois Claudio RN   Plan last modified Lois Claudio RN (6/16/2017)   Next INR check 8/25/2017   Target end date     Indications   Long-term (current) use of anticoagulants [Z79.01] [Z79.01]  DVT (deep venous thrombosis) (H) (Resolved) [I82.409]         Anticoagulation Episode Summary     [facility]    Preferred lab     Send INR reminders to  [facility]    Comments       Anticoagulation Care Providers     Provider Role Specialty Phone number    Josette Sarmiento MD  Family Practice [phone]            See the Encounter Report to view Anticoagulation Flowsheet and Dosing Calendar (Go to Encounters tab in chart review, and find the Anticoagulation Therapy Visit)    Dosage adjustment made based on physician directed care plan.    INR 2.9 today.  Continue with 7.5 mg daily = 52.5 mg weekly.  Recheck in 6 weeks.     Lois Claudio RN

## 2017-07-17 DIAGNOSIS — Z79.01 LONG-TERM (CURRENT) USE OF ANTICOAGULANTS: ICD-10-CM

## 2017-07-17 DIAGNOSIS — I82.492 DEEP VEIN THROMBOSIS (DVT) OF OTHER VEIN OF LEFT LOWER EXTREMITY: ICD-10-CM

## 2017-07-17 RX ORDER — WARFARIN SODIUM 5 MG/1
TABLET ORAL
Qty: 168 TABLET | Refills: 0 | Status: CANCELLED | OUTPATIENT
Start: 2017-07-17

## 2017-07-17 NOTE — TELEPHONE ENCOUNTER
JANTOVEN 5 MG tablet    Last Written Prescription Date: 4-  Last Fill Quantity: 168,  # refills:  tab   Last Office Visit with G, P or [facility] prescribing provider: 05-

## 2017-07-18 RX ORDER — WARFARIN SODIUM 5 MG/1
TABLET ORAL
Qty: 168 TABLET | Refills: 0 | Status: SHIPPED | OUTPATIENT
Start: 2017-07-18 | End: 2018-02-19

## 2017-08-25 ENCOUNTER — TELEPHONE (OUTPATIENT)
Dept: NURSING | Facility: CLINIC | Age: 52
End: 2017-08-25

## 2017-08-25 ENCOUNTER — ANTICOAGULATION THERAPY VISIT (OUTPATIENT)
Dept: NURSING | Facility: CLINIC | Age: 52
End: 2017-08-25
Payer: COMMERCIAL

## 2017-08-25 DIAGNOSIS — I82.492 DEEP VEIN THROMBOSIS (DVT) OF OTHER VEIN OF LEFT LOWER EXTREMITY: ICD-10-CM

## 2017-08-25 DIAGNOSIS — Z79.01 LONG-TERM (CURRENT) USE OF ANTICOAGULANTS: Primary | ICD-10-CM

## 2017-08-25 DIAGNOSIS — Z79.01 LONG-TERM (CURRENT) USE OF ANTICOAGULANTS: ICD-10-CM

## 2017-08-25 LAB — INR POINT OF CARE: 2.3 (ref 0.86–1.14)

## 2017-08-25 PROCEDURE — 85610 PROTHROMBIN TIME: CPT | Mod: QW

## 2017-08-25 PROCEDURE — 99207 ZZC NO CHARGE NURSE ONLY: CPT

## 2017-08-25 PROCEDURE — 36416 COLLJ CAPILLARY BLOOD SPEC: CPT

## 2017-08-25 NOTE — TELEPHONE ENCOUNTER
Patient due for annual INR clinic referral   Indication:  DVT -resolved  Range:  2 - 3  Order pended.  Please sign. Thank you    Lois Claudio RN

## 2017-08-25 NOTE — PROGRESS NOTES
ANTICOAGULATION FOLLOW-UP CLINIC VISIT    Patient Name:  Barry Herrera  Date:  8/25/2017  Contact Type:  Face to Face    SUBJECTIVE:     Patient Findings     Positives No Problem Findings           OBJECTIVE    INR Protime   Date Value Ref Range Status   08/25/2017 2.3 (A) 0.86 - 1.14 Final       ASSESSMENT / PLAN  INR assessment THER    Recheck INR In: 6 WEEKS    INR Location Clinic      Anticoagulation Summary as of 8/25/2017     INR goal 2.0-3.0   Today's INR 2.3   Maintenance plan 7.5 mg (5 mg x 1.5) every day   Full instructions 7.5 mg every day   Weekly total 52.5 mg   No change documented Lois Claudio RN   Plan last modified Lois Claudio RN (6/16/2017)   Next INR check 10/6/2017   Target end date     Indications   Long-term (current) use of anticoagulants [Z79.01] [Z79.01]  DVT (deep venous thrombosis) (H) (Resolved) [I82.409]         Anticoagulation Episode Summary     [facility]    Preferred lab     Send INR reminders to  [facility]    Comments       Anticoagulation Care Providers     Provider Role Specialty Phone number    Josette Sarmiento MD  Family Practice [phone]            See the Encounter Report to view Anticoagulation Flowsheet and Dosing Calendar (Go to Encounters tab in chart review, and find the Anticoagulation Therapy Visit)    Dosage adjustment made based on physician directed care plan.    INR of 2.3 today.  Continue with maintenance dose of 7.5 mg daily = 52.5 mg weekly.  Recheck in 6 weeks.      Lois Claudio RN

## 2017-10-06 ENCOUNTER — ANTICOAGULATION THERAPY VISIT (OUTPATIENT)
Dept: NURSING | Facility: CLINIC | Age: 52
End: 2017-10-06
Payer: COMMERCIAL

## 2017-10-06 DIAGNOSIS — Z79.01 LONG-TERM (CURRENT) USE OF ANTICOAGULANTS: ICD-10-CM

## 2017-10-06 LAB — INR POINT OF CARE: 2.6 (ref 0.86–1.14)

## 2017-10-06 PROCEDURE — 36416 COLLJ CAPILLARY BLOOD SPEC: CPT

## 2017-10-06 PROCEDURE — 85610 PROTHROMBIN TIME: CPT | Mod: QW

## 2017-10-06 PROCEDURE — 99207 ZZC NO CHARGE NURSE ONLY: CPT

## 2017-10-06 NOTE — MR AVS SNAPSHOT
Barry Herrera   10/6/2017 8:15 AM   Anticoagulation Therapy Visit    Description:  51 year old male   Provider:  [facility]   Department:  [facility]           INR as of 10/6/2017     Today's INR 2.6      Anticoagulation Summary as of 10/6/2017     INR goal 2.0-3.0   Today's INR 2.6   Full instructions 7.5 mg every day   Next INR check 11/17/2017    Indications   Long-term (current) use of anticoagulants [Z79.01] [Z79.01]  DVT (deep venous thrombosis) (H) (Resolved) [I82.409]         Your next Anticoagulation Clinic appointment(s)     Nov 17, 2017  8:00 AM CST   [facility]ticoagulation Visit with [facility]   [facility] [place] ([address] [address]   [phone]              Contact Numbers     Clinic Number:         October 2017 Details    Sun Mon Tue Wed Thu Fri Sat     1               2               3               4               5               6      7.5 mg   See details      7      7.5 mg           8      7.5 mg         9      7.5 mg         10      7.5 mg         11      7.5 mg         12      7.5 mg         13      7.5 mg         14      7.5 mg           15      7.5 mg         16      7.5 mg         17      7.5 mg         18      7.5 mg         19      7.5 mg         20      7.5 mg         21      7.5 mg           22      7.5 mg         23      7.5 mg         24      7.5 mg         25      7.5 mg         26      7.5 mg         27      7.5 mg         28      7.5 mg           29      7.5 mg         30      7.5 mg         31      7.5 mg              Date Details   10/06 This INR check               How to take your warfarin dose     To take:  7.5 mg Take 1.5 of the 5 mg tablets.           November 2017 Details    Sun Mon Tue Wed Thu Fri Sat        1      7.5 mg         2      7.5 mg         3      7.5 mg         4      7.5 mg           5      7.5 mg         6      7.5 mg         7      7.5 mg         8       7.5 mg         9      7.5 mg         10      7.5 mg         11      7.5 mg           12      7.5 mg         13      7.5 mg         14      7.5 mg         15      7.5 mg         16      7.5 mg         17            18                 19               20               21               22               23               24               25                 26               27               28               29               30                  Date Details   No additional details    Date of next INR:  11/17/2017         How to take your warfarin dose     To take:  7.5 mg Take 1.5 of the 5 mg tablets.

## 2017-10-06 NOTE — PROGRESS NOTES
ANTICOAGULATION FOLLOW-UP CLINIC VISIT    Patient Name:  Barry Herrera  Date:  10/6/2017  Contact Type:  Face to Face    SUBJECTIVE:     Patient Findings     Positives No Problem Findings           OBJECTIVE    INR Protime   Date Value Ref Range Status   10/06/2017 2.6 (A) 0.86 - 1.14 Final       ASSESSMENT / PLAN  INR assessment THER    Recheck INR In: 6 WEEKS    INR Location Clinic      Anticoagulation Summary as of 10/6/2017     INR goal 2.0-3.0   Today's INR 2.6   Maintenance plan 7.5 mg (5 mg x 1.5) every day   Full instructions 7.5 mg every day   Weekly total 52.5 mg   No change documented Debbie Galeana RN   Plan last modified Lois Claudio RN (6/16/2017)   Next INR check 11/17/2017   Target end date     Indications   Long-term (current) use of anticoagulants [Z79.01] [Z79.01]  DVT (deep venous thrombosis) (H) (Resolved) [I82.409]         Anticoagulation Episode Summary     [facility]    Preferred lab     Send INR reminders to [facility]    Comments       Anticoagulation Care Providers     Provider Role Specialty Phone number    Josette Sarmiento MD  Family Practice [phone]            See the Encounter Report to view Anticoagulation Flowsheet and Dosing Calendar (Go to Encounters tab in chart review, and find the Anticoagulation Therapy Visit)    Patient INR at goal.  Will continue same maintenance dosing of 52.5 mg and follow up in 6 weeks    Debbie Brito, RN

## 2017-11-17 ENCOUNTER — ANTICOAGULATION THERAPY VISIT (OUTPATIENT)
Dept: NURSING | Facility: CLINIC | Age: 52
End: 2017-11-17
Payer: COMMERCIAL

## 2017-11-17 DIAGNOSIS — Z79.01 LONG-TERM (CURRENT) USE OF ANTICOAGULANTS: ICD-10-CM

## 2017-11-17 LAB — INR POINT OF CARE: 2.1 (ref 0.86–1.14)

## 2017-11-17 PROCEDURE — 85610 PROTHROMBIN TIME: CPT | Mod: QW

## 2017-11-17 PROCEDURE — 99207 ZZC NO CHARGE NURSE ONLY: CPT

## 2017-11-17 PROCEDURE — 36416 COLLJ CAPILLARY BLOOD SPEC: CPT

## 2017-11-17 NOTE — PROGRESS NOTES
ANTICOAGULATION FOLLOW-UP CLINIC VISIT    Patient Name:  Barry Herrera  Date:  11/17/2017  Contact Type:  Face to Face    SUBJECTIVE:     Patient Findings     Positives No Problem Findings           OBJECTIVE    INR Protime   Date Value Ref Range Status   11/17/2017 2.1 (A) 0.86 - 1.14 Final       ASSESSMENT / PLAN  INR assessment THER    Recheck INR In: 6 WEEKS    INR Location Clinic      Anticoagulation Summary as of 11/17/2017     INR goal 2.0-3.0   Today's INR 2.1   Maintenance plan 7.5 mg (5 mg x 1.5) every day   Full instructions 7.5 mg every day   Weekly total 52.5 mg   No change documented Debbie Galeana RN   Plan last modified Lois Claudio RN (6/16/2017)   Next INR check 12/22/2017   Target end date     Indications   Long-term (current) use of anticoagulants [Z79.01] [Z79.01]  DVT (deep venous thrombosis) (H) (Resolved) [I82.409]         Anticoagulation Episode Summary     [facility]    Preferred lab     Send INR reminders to [facility]    Comments       Anticoagulation Care Providers     Provider Role Specialty Phone number    Josette Sarmiento MD  Family Practice [phone]            See the Encounter Report to view Anticoagulation Flowsheet and Dosing Calendar (Go to Encounters tab in chart review, and find the Anticoagulation Therapy Visit)    Patient INR at goal.  Patient will continue to take same 52.5 mg weekly dosing and follow up in 6 weeks    Debbie Brito, RN

## 2017-11-17 NOTE — MR AVS SNAPSHOT
Barry Herrera   11/17/2017 8:00 AM   Anticoagulation Therapy Visit    Description:  52 year old male   Provider[facility]  [facility]   Department:  [facility] Nurse           INR as of 11/17/2017     Today's INR 2.1      Anticoagulation Summary as of 11/17/2017     INR goal 2.0-3.0   Today's INR 2.1   Full instructions 7.5 mg every day   Next INR check 12/22/2017    Indications   Long-term (current) use of anticoagulants [Z79.01] [Z79.01]  DVT (deep venous thrombosis) (H) (Resolved) [I82.409]         Your next Anticoagulation Clinic appointment(s)     Dec 22, 2017  8:30 AM CST   Anticoagulation Visit with [facility]   [place] ([address] [address]   [phone]              Contact Numbers     Clinic Number:         November 2017 Details    Sun Mon Tue Wed Thu Fri Sat        1               2               3               4                 5               6               7               8               9               10               11                 12               13               14               15               16               17      7.5 mg   See details      18      7.5 mg           19      7.5 mg         20      7.5 mg         21      7.5 mg         22      7.5 mg         23      7.5 mg         24      7.5 mg         25      7.5 mg           26      7.5 mg         27      7.5 mg         28      7.5 mg         29      7.5 mg         30      7.5 mg            Date Details   11/17 This INR check               How to take your warfarin dose     To take:  7.5 mg Take 1.5 of the 5 mg tablets.           December 2017 Details    Sun Mon Tue Wed Thu Fri Sat          1      7.5 mg         2      7.5 mg           3      7.5 mg         4      7.5 mg         5      7.5 mg         6      7.5 mg         7      7.5 mg         8      7.5 mg         9      7.5 mg           10      7.5 mg         11      7.5 mg          12      7.5 mg         13      7.5 mg         14      7.5 mg         15      7.5 mg         16      7.5 mg           17      7.5 mg         18      7.5 mg         19      7.5 mg         20      7.5 mg         21      7.5 mg         22            23                 24               25               26               27               28               29               30                 31                      Date Details   No additional details    Date of next INR:  12/22/2017         How to take your warfarin dose     To take:  7.5 mg Take 1.5 of the 5 mg tablets.

## 2017-11-21 ENCOUNTER — TRANSFERRED RECORDS (OUTPATIENT)
Dept: HEALTH INFORMATION MANAGEMENT | Facility: CLINIC | Age: 52
End: 2017-11-21

## 2017-11-27 ENCOUNTER — TELEPHONE (OUTPATIENT)
Dept: FAMILY MEDICINE | Facility: CLINIC | Age: 52
End: 2017-11-27

## 2017-11-27 NOTE — TELEPHONE ENCOUNTER
Spoke with Robert [facility] [address] # [phone] Re: PA for Simran. It has been approved and patient has picked up rx. TASHA Colunga LPN

## 2017-12-22 ENCOUNTER — ANTICOAGULATION THERAPY VISIT (OUTPATIENT)
Dept: NURSING | Facility: CLINIC | Age: 52
End: 2017-12-22
Payer: COMMERCIAL

## 2017-12-22 DIAGNOSIS — Z79.01 LONG-TERM (CURRENT) USE OF ANTICOAGULANTS: ICD-10-CM

## 2017-12-22 LAB — INR POINT OF CARE: 2.3 (ref 0.86–1.14)

## 2017-12-22 PROCEDURE — 99207 ZZC NO CHARGE NURSE ONLY: CPT

## 2017-12-22 PROCEDURE — 36416 COLLJ CAPILLARY BLOOD SPEC: CPT

## 2017-12-22 PROCEDURE — 85610 PROTHROMBIN TIME: CPT | Mod: QW

## 2017-12-22 NOTE — MR AVS SNAPSHOT
Barry Herrera   12/22/2017 8:30 AM   Anticoagulation Therapy Visit    Description:  52 year old male   Provider:  [facility]   Department:  [facility] Nurse           INR as of 12/22/2017     Today's INR 2.3      Anticoagulation Summary as of 12/22/2017     INR goal 2.0-3.0   Today's INR 2.3   Full instructions 7.5 mg every day   Next INR check 2/2/2018    Indications   Long-term (current) use of anticoagulants [Z79.01] [Z79.01]  DVT (deep venous thrombosis) (H) (Resolved) [I82.409]         Your next Anticoagulation Clinic appointment(s)     Feb 02, 2018  8:15 AM CST   Anticoagulation Visit with [facility]   [facility] [place] ([address] [address]   [phone]              Contact Numbers     Clinic Number:         December 2017 Details    Sun Mon Tue Wed Thu Fri Sat          1               2                 3               4               5               6               7               8               9                 10               11               12               13               14               15               16                 17               18               19               20               21               22      7.5 mg   See details      23      7.5 mg           24      7.5 mg         25      7.5 mg         26      7.5 mg         27      7.5 mg         28      7.5 mg         29      7.5 mg         30      7.5 mg           31      7.5 mg                Date Details   12/22 This INR check               How to take your warfarin dose     To take:  7.5 mg Take 1.5 of the 5 mg tablets.           January 2018 Details    Sun Mon Tue Wed Thu Fri Sat      1      7.5 mg         2      7.5 mg         3      7.5 mg         4      7.5 mg         5      7.5 mg         6      7.5 mg           7      7.5 mg         8      7.5 mg         9      7.5 mg         10      7.5 mg         11      7.5 mg          12      7.5 mg         13      7.5 mg           14      7.5 mg         15      7.5 mg         16      7.5 mg         17      7.5 mg         18      7.5 mg         19      7.5 mg         20      7.5 mg           21      7.5 mg         22      7.5 mg         23      7.5 mg         24      7.5 mg         25      7.5 mg         26      7.5 mg         27      7.5 mg           28      7.5 mg         29      7.5 mg         30      7.5 mg         31      7.5 mg             Date Details   No additional details            How to take your warfarin dose     To take:  7.5 mg Take 1.5 of the 5 mg tablets.           February 2018 Details    Sun Mon Tue Wed Thu Fri Sat         1      7.5 mg         2            3                 4               5               6               7               8               9               10                 11               12               13               14               15               16               17                 18               19               20               21               22               23               24                 25               26               27               28                   Date Details   No additional details    Date of next INR:  2/2/2018         How to take your warfarin dose     To take:  7.5 mg Take 1.5 of the 5 mg tablets.

## 2017-12-22 NOTE — PROGRESS NOTES
ANTICOAGULATION FOLLOW-UP CLINIC VISIT    Patient Name:  Barry Herrera  Date:  12/22/2017  Contact Type:  Face to Face    SUBJECTIVE:     Patient Findings     Positives No Problem Findings           OBJECTIVE    INR Protime   Date Value Ref Range Status   12/22/2017 2.3 (A) 0.86 - 1.14 Final       ASSESSMENT / PLAN  INR assessment THER    Recheck INR In: 6 WEEKS    INR Location Clinic      Anticoagulation Summary as of 12/22/2017     INR goal 2.0-3.0   Today's INR 2.3   Maintenance plan 7.5 mg (5 mg x 1.5) every day   Full instructions 7.5 mg every day   Weekly total 52.5 mg   Plan last modified Lois Claudio RN (6/16/2017)   Next INR check    Target end date     Indications   Long-term (current) use of anticoagulants [Z79.01] [Z79.01]  DVT (deep venous thrombosis) (H) (Resolved) [I82.409]         Anticoagulation Episode Summary     [facility]    Preferred lab     Send INR reminders to [facility]    Comments       Anticoagulation Care Providers     Provider Role Specialty Phone number    Josette Sarmiento MD  [facility] [phone]            See the Encounter Report to view Anticoagulation Flowsheet and Dosing Calendar (Go to Encounters tab in chart review, and find the Anticoagulation Therapy Visit)    Patient INR at goal.  Patient will continue to take 52.5 mg weekly and follow up in 6 weeks.    Debbie Brito RN

## 2018-02-02 ENCOUNTER — ANTICOAGULATION THERAPY VISIT (OUTPATIENT)
Dept: NURSING | Facility: CLINIC | Age: 53
End: 2018-02-02
Payer: COMMERCIAL

## 2018-02-02 DIAGNOSIS — Z79.01 LONG-TERM (CURRENT) USE OF ANTICOAGULANTS: ICD-10-CM

## 2018-02-02 LAB — INR POINT OF CARE: 2 (ref 0.86–1.14)

## 2018-02-02 PROCEDURE — 36416 COLLJ CAPILLARY BLOOD SPEC: CPT

## 2018-02-02 PROCEDURE — 99207 ZZC NO CHARGE NURSE ONLY: CPT

## 2018-02-02 PROCEDURE — 85610 PROTHROMBIN TIME: CPT | Mod: QW

## 2018-02-02 NOTE — PROGRESS NOTES
ANTICOAGULATION FOLLOW-UP CLINIC VISIT    Patient Name:  Barry Herrera  Date:  2/2/2018  Contact Type:  Face to Face    SUBJECTIVE:     Patient Findings     Positives No Problem Findings           OBJECTIVE    INR Protime   Date Value Ref Range Status   02/02/2018 2.0 (A) 0.86 - 1.14 Final       ASSESSMENT / PLAN  INR assessment THER    Recheck INR In: 6 WEEKS    INR Location Clinic      Anticoagulation Summary as of 2/2/2018     INR goal 2.0-3.0   Today's INR 2.0   Maintenance plan 7.5 mg (5 mg x 1.5) every day   Full instructions 7.5 mg every day   Weekly total 52.5 mg   No change documented Debbie Galeana RN   Plan last modified Lois Claudio RN (6/16/2017)   Next INR check 3/16/2018   Target end date     Indications   Long-term (current) use of anticoagulants [Z79.01] [Z79.01]  DVT (deep venous thrombosis) (H) (Resolved) [I82.409]         Anticoagulation Episode Summary     [facility]    Preferred lab     Send INR reminders to [facility]    Comments       Anticoagulation Care Providers     Provider Role Specialty Phone number    Josette Sarmiento MD  Family Practice [phone]            See the Encounter Report to view Anticoagulation Flowsheet and Dosing Calendar (Go to Encounters tab in chart review, and find the Anticoagulation Therapy Visit)    Patient INR at goal.  Continue 52.5 mg weekly and follow up in 6 weeks.    Debbie Brito, RN

## 2018-02-02 NOTE — MR AVS SNAPSHOT
Barry BARRERA Javier   2/2/2018 8:15 AM   Anticoagulation Therapy Visit    Description:  52 year old male   Provider[facility]  [facility]   Department:  [facility]           INR as of 2/2/2018     Today's INR 2.0      Anticoagulation Summary as of 2/2/2018     INR goal 2.0-3.0   Today's INR 2.0   Full instructions 7.5 mg every day   Next INR check 3/16/2018    Indications   Long-term (current) use of anticoagulants [Z79.01] [Z79.01]  DVT (deep venous thrombosis) (H) (Resolved) [I82.409]         Your next Anticoagulation Clinic appointment(s)     Mar 16, 2018  8:00 AM CDT   Anticoagulation Visit with [facility]   [facility] [place] ([address] [address]   [phone]              Contact Numbers     Clinic Number:         February 2018 Details    Sun Mon Tue Wed Thu Fri Sat         1               2      7.5 mg   See details      3      7.5 mg           4      7.5 mg         5      7.5 mg         6      7.5 mg         7      7.5 mg         8      7.5 mg         9      7.5 mg         10      7.5 mg           11      7.5 mg         12      7.5 mg         13      7.5 mg         14      7.5 mg         15      7.5 mg         16      7.5 mg         17      7.5 mg           18      7.5 mg         19      7.5 mg         20      7.5 mg         21      7.5 mg         22      7.5 mg         23      7.5 mg         24      7.5 mg           25      7.5 mg         26      7.5 mg         27      7.5 mg         28      7.5 mg             Date Details   02/02 This INR check               How to take your warfarin dose     To take:  7.5 mg Take 1.5 of the 5 mg tablets.           March 2018 Details    Sun Mon Tue Wed Thu Fri Sat         1      7.5 mg         2      7.5 mg         3      7.5 mg           4      7.5 mg         5      7.5 mg         6      7.5 mg         7      7.5 mg         8      7.5 mg         9      7.5 mg         10       7.5 mg           11      7.5 mg         12      7.5 mg         13      7.5 mg         14      7.5 mg         15      7.5 mg         16            17                 18               19               20               21               22               23               24                 25               26               27               28               29               30               31                Date Details   No additional details    Date of next INR:  3/16/2018         How to take your warfarin dose     To take:  7.5 mg Take 1.5 of the 5 mg tablets.

## 2018-02-19 DIAGNOSIS — Z79.01 LONG-TERM (CURRENT) USE OF ANTICOAGULANTS: ICD-10-CM

## 2018-02-19 RX ORDER — WARFARIN SODIUM 5 MG/1
TABLET ORAL
Qty: 168 TABLET | Refills: 0 | Status: SHIPPED | OUTPATIENT
Start: 2018-02-19 | End: 2018-06-21

## 2018-02-19 NOTE — TELEPHONE ENCOUNTER
"Requested Prescriptions   Pending Prescriptions Disp Refills     JANTOVEN 5 MG tablet  Last Written Prescription Date:  7/18/2017  Last Fill Quantity: 168 tablet,  # refills: 0   Last Office Visit: 5/10/2017   Future Office Visit:    168 tablet 0     Sig: TAKE 2 TABLETS BY MOUTH ON MONDAYS AND THURSDAYS, AND TAKE 1 1/2 TABLETS ALL OTHER DAYS AS DIRECTED    Vitamin K Antagonists Failed    2/19/2018 10:18 AM       Failed - INR is within goal in the past 6 weeks    Confirm INR is within goal in the past 6 weeks.     Recent Labs   Lab Test 02/02/18   INR  2.0*                      Passed - Recent or future visit with authorizing provider's specialty    Patient had office visit in the last year or has a visit in the next 30 days with authorizing provider.  See \"Patient Info\" tab in inbasket, or \"Choose Columns\" in Meds & Orders section of the refill encounter.            Passed - Patient is 18 years of age or older          "

## 2018-02-19 NOTE — TELEPHONE ENCOUNTER
Prescription approved per [facility], [facility] or [facility] refill protocol.  Debbie Kirk RN - Triage  [facility]

## 2018-03-16 ENCOUNTER — ANTICOAGULATION THERAPY VISIT (OUTPATIENT)
Dept: NURSING | Facility: CLINIC | Age: 53
End: 2018-03-16
Payer: COMMERCIAL

## 2018-03-16 DIAGNOSIS — Z79.01 LONG-TERM (CURRENT) USE OF ANTICOAGULANTS: ICD-10-CM

## 2018-03-16 LAB — INR POINT OF CARE: 2.9 (ref 0.86–1.14)

## 2018-03-16 PROCEDURE — 36416 COLLJ CAPILLARY BLOOD SPEC: CPT

## 2018-03-16 PROCEDURE — 99207 ZZC NO CHARGE NURSE ONLY: CPT

## 2018-03-16 PROCEDURE — 85610 PROTHROMBIN TIME: CPT | Mod: QW

## 2018-03-16 NOTE — MR AVS SNAPSHOT
Barry BARRERA Javier   3/16/2018 8:00 AM   Anticoagulation Therapy Visit    Description:  52 year old male   Provider[facility]  [facility]   Department:  [facility]           INR as of 3/16/2018     Today's INR 2.9      Anticoagulation Summary as of 3/16/2018     INR goal 2.0-3.0   Today's INR 2.9   Full instructions 7.5 mg every day   Next INR check 4/27/2018    Indications   Long-term (current) use of anticoagulants [Z79.01] [Z79.01]  DVT (deep venous thrombosis) (H) (Resolved) [I82.409]         Your next Anticoagulation Clinic appointment(s)     Apr 27, 2018  8:00 AM CDT   Anticoagulation Visit with [facility]   [facility] [place] ([address] [address]   [phone]              Contact Numbers     Clinic Number:         March 2018 Details    Sun Mon Tue Wed Thu Fri Sat         1               2               3                 4               5               6               7               8               9               10                 11               12               13               14               15               16      7.5 mg   See details      17      7.5 mg           18      7.5 mg         19      7.5 mg         20      7.5 mg         21      7.5 mg         22      7.5 mg         23      7.5 mg         24      7.5 mg           25      7.5 mg         26      7.5 mg         27      7.5 mg         28      7.5 mg         29      7.5 mg         30      7.5 mg         31      7.5 mg          Date Details   03/16 This INR check               How to take your warfarin dose     To take:  7.5 mg Take 1.5 of the 5 mg tablets.           April 2018 Details    Sun Mon Tue Wed Thu Fri Sat     1      7.5 mg         2      7.5 mg         3      7.5 mg         4      7.5 mg         5      7.5 mg         6      7.5 mg         7      7.5 mg           8      7.5 mg         9      7.5 mg         10      7.5 mg         11       7.5 mg         12      7.5 mg         13      7.5 mg         14      7.5 mg           15      7.5 mg         16      7.5 mg         17      7.5 mg         18      7.5 mg         19      7.5 mg         20      7.5 mg         21      7.5 mg           22      7.5 mg         23      7.5 mg         24      7.5 mg         25      7.5 mg         26      7.5 mg         27            28                 29               30                     Date Details   No additional details    Date of next INR:  4/27/2018         How to take your warfarin dose     To take:  7.5 mg Take 1.5 of the 5 mg tablets.

## 2018-03-16 NOTE — PROGRESS NOTES
ANTICOAGULATION FOLLOW-UP CLINIC VISIT    Patient Name:  Barry Herrera  Date:  3/16/2018  Contact Type:  Face to Face    SUBJECTIVE:     Patient Findings     Positives No Problem Findings           OBJECTIVE    INR Protime   Date Value Ref Range Status   03/16/2018 2.9 (A) 0.86 - 1.14 Final       ASSESSMENT / PLAN  INR assessment THER    Recheck INR In: 6 WEEKS    INR Location Clinic      Anticoagulation Summary as of 3/16/2018     INR goal 2.0-3.0   Today's INR 2.9   Maintenance plan 7.5 mg (5 mg x 1.5) every day   Full instructions 7.5 mg every day   Weekly total 52.5 mg   No change documented Debbie Galeana RN   Plan last modified Lois Claudio RN (6/16/2017)   Next INR check 4/27/2018   Target end date     Indications   Long-term (current) use of anticoagulants [Z79.01] [Z79.01]  DVT (deep venous thrombosis) (H) (Resolved) [I82.409]         Anticoagulation Episode Summary     [facility]    Preferred lab     Send INR reminders to [facility]    Comments       Anticoagulation Care Providers     Provider Role Specialty Phone number    Josette Sarmiento MD  Family Practice [phone]            See the Encounter Report to view Anticoagulation Flowsheet and Dosing Calendar (Go to Encounters tab in chart review, and find the Anticoagulation Therapy Visit)    Patient INR is therapeutic.  Patient will continue 52.5 mg weekly total and follow up in 6 weeks or sooner if any concerns or problems.    Debbie Brito RN

## 2018-04-27 ENCOUNTER — ANTICOAGULATION THERAPY VISIT (OUTPATIENT)
Dept: NURSING | Facility: CLINIC | Age: 53
End: 2018-04-27
Payer: COMMERCIAL

## 2018-04-27 DIAGNOSIS — Z79.01 LONG-TERM (CURRENT) USE OF ANTICOAGULANTS: ICD-10-CM

## 2018-04-27 LAB — INR POINT OF CARE: 2.1 (ref 0.86–1.14)

## 2018-04-27 PROCEDURE — 36416 COLLJ CAPILLARY BLOOD SPEC: CPT

## 2018-04-27 PROCEDURE — 99207 ZZC NO CHARGE NURSE ONLY: CPT

## 2018-04-27 PROCEDURE — 85610 PROTHROMBIN TIME: CPT | Mod: QW

## 2018-04-27 NOTE — PROGRESS NOTES
ANTICOAGULATION FOLLOW-UP CLINIC VISIT    Patient Name:  Barry Herrera  Date:  4/27/2018  Contact Type:  Face to Face    SUBJECTIVE:     Patient Findings     Positives No Problem Findings           OBJECTIVE    INR Protime   Date Value Ref Range Status   04/27/2018 2.1 (A) 0.86 - 1.14 Final       ASSESSMENT / PLAN  INR assessment THER    Recheck INR In: 6 WEEKS    INR Location Clinic      Anticoagulation Summary as of 4/27/2018     INR goal 2.0-3.0   Today's INR 2.1   Maintenance plan 7.5 mg (5 mg x 1.5) every day   Full instructions 7.5 mg every day   Weekly total 52.5 mg   No change documented Debbie Galeana RN   Plan last modified Lois Claudio RN (6/16/2017)   Next INR check 6/8/2018   Target end date     Indications   Long-term (current) use of anticoagulants [Z79.01] [Z79.01]  DVT (deep venous thrombosis) (H) (Resolved) [I82.409]         Anticoagulation Episode Summary     [facility]    Preferred lab     Send INR reminders to [facility]    Comments       Anticoagulation Care Providers     Provider Role Specialty Phone number    Josette Sarmiento MD  Family Practice [phone]            See the Encounter Report to view Anticoagulation Flowsheet and Dosing Calendar (Go to Encounters tab in chart review, and find the Anticoagulation Therapy Visit)    INR is therapeutic.  Patient will continue with same weekly maintenance dosing of 52.5 mg and then follow up in 6 weeks or sooner if there are any concerns or problems.      Debbie Brito RN

## 2018-04-27 NOTE — MR AVS SNAPSHOT
Barry Herrera   4/27/2018 8:00 AM   Anticoagulation Therapy Visit    Description:  52 year old male   Provider:  [facility]   Department:  Ec Nurse           INR as of 4/27/2018     Today's INR 2.1      Anticoagulation Summary as of 4/27/2018     INR goal 2.0-3.0   Today's INR 2.1   Full instructions 7.5 mg every day   Next INR check 6/8/2018    Indications   Long-term (current) use of anticoagulants [Z79.01] [Z79.01]  DVT (deep venous thrombosis) (H) (Resolved) [I82.409]         Your next Anticoagulation Clinic appointment(s)     Jun 08, 2018  8:00 AM CDT   Anticoagulation Visit with [facility]   [facility] ([address] [address]   [phone]              Contact Numbers     Clinic Number:         April 2018 Details    Sun Mon Tue Wed Thu Fri Sat     1               2               3               4               5               6               7                 8               9               10               11               12               13               14                 15               16               17               18               19               20               21                 22               23               24               25               26               27      7.5 mg   See details      28      7.5 mg           29      7.5 mg         30      7.5 mg               Date Details   04/27 This INR check               How to take your warfarin dose     To take:  7.5 mg Take 1.5 of the 5 mg tablets.           May 2018 Details    Sun Mon Tue Wed Thu Fri Sat       1      7.5 mg         2      7.5 mg         3      7.5 mg         4      7.5 mg         5      7.5 mg           6      7.5 mg         7      7.5 mg         8      7.5 mg         9      7.5 mg         10      7.5 mg         11      7.5 mg         12      7.5 mg           13      7.5 mg         14      7.5 mg          15      7.5 mg         16      7.5 mg         17      7.5 mg         18      7.5 mg         19      7.5 mg           20      7.5 mg         21      7.5 mg         22      7.5 mg         23      7.5 mg         24      7.5 mg         25      7.5 mg         26      7.5 mg           27      7.5 mg         28      7.5 mg         29      7.5 mg         30      7.5 mg         31      7.5 mg            Date Details   No additional details            How to take your warfarin dose     To take:  7.5 mg Take 1.5 of the 5 mg tablets.           June 2018 Details    Sun Mon Tue Wed Thu Fri Sat          1      7.5 mg         2      7.5 mg           3      7.5 mg         4      7.5 mg         5      7.5 mg         6      7.5 mg         7      7.5 mg         8            9                 10               11               12               13               14               15               16                 17               18               19               20               21               22               23                 24               25               26               27               28               29               30                Date Details   No additional details    Date of next INR:  6/8/2018         How to take your warfarin dose     To take:  7.5 mg Take 1.5 of the 5 mg tablets.

## 2018-06-08 ENCOUNTER — ANTICOAGULATION THERAPY VISIT (OUTPATIENT)
Dept: NURSING | Facility: CLINIC | Age: 53
End: 2018-06-08
Payer: COMMERCIAL

## 2018-06-08 DIAGNOSIS — Z79.01 LONG-TERM (CURRENT) USE OF ANTICOAGULANTS: ICD-10-CM

## 2018-06-08 LAB — INR POINT OF CARE: 2.4 (ref 0.86–1.14)

## 2018-06-08 PROCEDURE — 99207 ZZC NO CHARGE NURSE ONLY: CPT

## 2018-06-08 PROCEDURE — 85610 PROTHROMBIN TIME: CPT | Mod: QW

## 2018-06-08 PROCEDURE — 36416 COLLJ CAPILLARY BLOOD SPEC: CPT

## 2018-06-08 NOTE — PROGRESS NOTES
ANTICOAGULATION FOLLOW-UP CLINIC VISIT    Patient Name:  Barry Herrera  Date:  6/8/2018  Contact Type:  Face to Face    SUBJECTIVE:     Patient Findings     Positives Missed doses           OBJECTIVE    INR Protime   Date Value Ref Range Status   06/08/2018 2.4 (A) 0.86 - 1.14 Final       ASSESSMENT / PLAN  INR assessment THER    Recheck INR In: 6 WEEKS    INR Location Clinic      Anticoagulation Summary as of 6/8/2018     INR goal 2.0-3.0   Today's INR 2.4   Warfarin maintenance plan 7.5 mg (5 mg x 1.5) every day   Full warfarin instructions 7.5 mg every day   Weekly warfarin total 52.5 mg   No change documented Debbie Galeana RN   Plan last modified Lois Claudio RN (6/16/2017)   Next INR check 7/20/2018   Target end date     Indications   Long-term (current) use of anticoagulants [Z79.01] [Z79.01]  DVT (deep venous thrombosis) (H) (Resolved) [I82.409]         Anticoagulation Episode Summary     INR check location [facility]    Preferred lab     Send INR reminders to  [facility]    Comments       Anticoagulation Care Providers     Provider Role Specialty Phone number    Josette Sarmiento MD  Family Practice [phone]            See the Encounter Report to view Anticoagulation Flowsheet and Dosing Calendar (Go to Encounters tab in chart review, and find the Anticoagulation Therapy Visit)    INR is therapeutic.  Patient will continue with same weekly maintenance dosing of 52.5 mg and then follow up in 6 weeks or sooner if there are any concerns or problems.      Debbie Brito RN

## 2018-06-08 NOTE — MR AVS SNAPSHOT
Barry BRUCE Herrera   6/8/2018 8:00 AM   Anticoagulation Therapy Visit    Description:  52 year old male   Provider:  [facility]   Department:  Ec Nurse           INR as of 6/8/2018     Today's INR 2.4      Anticoagulation Summary as of 6/8/2018     INR goal 2.0-3.0   Today's INR 2.4   Full warfarin instructions 7.5 mg every day   Next INR check 7/20/2018    Indications   Long-term (current) use of anticoagulants [Z79.01] [Z79.01]  DVT (deep venous thrombosis) (H) (Resolved) [I82.409]         Your next Anticoagulation Clinic appointment(s)     Jul 20, 2018  8:15 AM CDT   Anticoagulation Visit with [facility]   [facility] ([address] [address]   [phone]              Contact Numbers     Clinic Number:         June 2018 Details    Sun Mon Tue Wed Thu Fri Sat          1               2                 3               4               5               6               7               8      7.5 mg   See details      9      7.5 mg           10      7.5 mg         11      7.5 mg         12      7.5 mg         13      7.5 mg         14      7.5 mg         15      7.5 mg         16      7.5 mg           17      7.5 mg         18      7.5 mg         19      7.5 mg         20      7.5 mg         21      7.5 mg         22      7.5 mg         23      7.5 mg           24      7.5 mg         25      7.5 mg         26      7.5 mg         27      7.5 mg         28      7.5 mg         29      7.5 mg         30      7.5 mg          Date Details   06/08 This INR check               How to take your warfarin dose     To take:  7.5 mg Take 1.5 of the 5 mg tablets.           July 2018 Details    Sun Mon Tue Wed Thu Fri Sat     1      7.5 mg         2      7.5 mg         3      7.5 mg         4      7.5 mg         5      7.5 mg         6      7.5 mg         7      7.5 mg           8      7.5 mg         9      7.5 mg          10      7.5 mg         11      7.5 mg         12      7.5 mg         13      7.5 mg         14      7.5 mg           15      7.5 mg         16      7.5 mg         17      7.5 mg         18      7.5 mg         19      7.5 mg         20            21                 22               23               24               25               26               27               28                 29               30               31                    Date Details   No additional details    Date of next INR:  7/20/2018         How to take your warfarin dose     To take:  7.5 mg Take 1.5 of the 5 mg tablets.

## 2018-06-21 DIAGNOSIS — I82.409 DVT (DEEP VENOUS THROMBOSIS) (H): Primary | ICD-10-CM

## 2018-06-21 DIAGNOSIS — Z79.01 LONG-TERM (CURRENT) USE OF ANTICOAGULANTS: ICD-10-CM

## 2018-06-21 RX ORDER — WARFARIN SODIUM 5 MG/1
TABLET ORAL
Qty: 10 TABLET | Refills: 0 | Status: SHIPPED | OUTPATIENT
Start: 2018-06-21 | End: 2018-08-03

## 2018-06-21 NOTE — TELEPHONE ENCOUNTER
Prescription approved per [facility] Refill Protocol. Rx sent for 10 tablets. Carline Reynoso RN

## 2018-06-21 NOTE — TELEPHONE ENCOUNTER
"Pt called an forgot his jantoven at home and is in [place]. He is asking for a 5 day refill (he just filled his rx before he left and is worried that there might be an issue with insurance) . Please send refill to [facility] [place]. Any questions, pt can be reached at [phone]. Thank you  Requested Prescriptions   Pending Prescriptions Disp Refills     JANTOVEN 5 MG tablet [Pharmacy Med Name: JANTOVEN 5MG TABLETS (PEACH)] 168 tablet 0     Sig: TAKE 2 TABLETS BY MOUTH ON MONDAYS AND THURSDAYS AND 1 1/2 TABLETS ALL OTHER DAYS AS DIRECTED    Vitamin K Antagonists Failed    6/21/2018  8:49 AM       Failed - INR is within goal in the past 6 weeks    Confirm INR is within goal in the past 6 weeks.     Recent Labs   Lab Test 06/08/18   INR  2.4*                      Passed - Recent (12 mo) or future (30 days) visit within the authorizing provider's specialty    Patient had office visit in the last 12 months or has a visit in the next 30 days with authorizing provider or within the authorizing provider's specialty.  See \"Patient Info\" tab in inbasket, or \"Choose Columns\" in Meds & Orders section of the refill encounter.           Passed - Patient is 18 years of age or older        Last Written Prescription Date:  2/19/2018  Last Fill Quantity: 168,  # refills: 0   Last office visit: 5/10/2017 with prescribing provider:  5/10/2017   Future Office Visit:      "

## 2018-07-13 DIAGNOSIS — Z79.01 LONG-TERM (CURRENT) USE OF ANTICOAGULANTS: ICD-10-CM

## 2018-07-13 RX ORDER — WARFARIN SODIUM 5 MG/1
TABLET ORAL
Qty: 50 TABLET | Refills: 0 | Status: SHIPPED | OUTPATIENT
Start: 2018-07-13 | End: 2018-08-03

## 2018-07-13 NOTE — TELEPHONE ENCOUNTER
30 day supply given.  Patient is due for an OV.  Please call and assist with scheduling appointment prior to next refill   Debbie Kirk RN - Triage  [facility]

## 2018-07-13 NOTE — TELEPHONE ENCOUNTER
"Requested Prescriptions   Pending Prescriptions Disp Refills     JANTOVEN 5 MG tablet [Pharmacy Med Name: JANTOVEN 5MG TABLETS (PEACH)]  Last Written Prescription Date:  6/21/18  Last Fill Quantity: 10,  # refills: 0   Last office visit: 5/10/2017 with prescribing provider:  [place]   Future Office Visit:     168 tablet 0     Sig: TAKE 2 TABLETS BY MOUTH ON MONDAYS AND THURSDAYS AND 1 1/2 TABLETS ALL OTHER DAYS AS DIRECTED    Vitamin K Antagonists Failed    7/13/2018 11:38 AM       Failed - INR is within goal in the past 6 weeks    Confirm INR is within goal in the past 6 weeks.     Recent Labs   Lab Test 06/08/18   INR  2.4*                      Passed - Recent (12 mo) or future (30 days) visit within the authorizing provider's specialty    Patient had office visit in the last 12 months or has a visit in the next 30 days with authorizing provider or within the authorizing provider's specialty.  See \"Patient Info\" tab in inbasket, or \"Choose Columns\" in Meds & Orders section of the refill encounter.           Passed - Patient is 18 years of age or older          "

## 2018-07-20 ENCOUNTER — ANTICOAGULATION THERAPY VISIT (OUTPATIENT)
Dept: NURSING | Facility: CLINIC | Age: 53
End: 2018-07-20
Payer: COMMERCIAL

## 2018-07-20 DIAGNOSIS — Z79.01 LONG-TERM (CURRENT) USE OF ANTICOAGULANTS: ICD-10-CM

## 2018-07-20 LAB — INR POINT OF CARE: 2 (ref 0.86–1.14)

## 2018-07-20 PROCEDURE — 99207 ZZC NO CHARGE NURSE ONLY: CPT

## 2018-07-20 PROCEDURE — 36416 COLLJ CAPILLARY BLOOD SPEC: CPT

## 2018-07-20 PROCEDURE — 85610 PROTHROMBIN TIME: CPT | Mod: QW

## 2018-07-20 NOTE — MR AVS SNAPSHOT
Barry BARRERA Javier   7/20/2018 8:15 AM   Anticoagulation Therapy Visit    Description:  52 year old male   Provider:  [facility]   Department:  Ec Nurse           INR as of 7/20/2018     Today's INR 2.0      Anticoagulation Summary as of 7/20/2018     INR goal 2.0-3.0   Today's INR 2.0   Full warfarin instructions 7.5 mg every day   Next INR check 8/31/2018    Indications   Long-term (current) use of anticoagulants [Z79.01] [Z79.01]  DVT (deep venous thrombosis) (H) (Resolved) [I82.409]         Description     Patient states that he had large spinach salad last night.      Your next Anticoagulation Clinic appointment(s)     Aug 31, 2018  8:00 AM CDT   Anticoagulation Visit with  ANTICOAGULATION CLINIC   [facility] ([address] [address]   [phone]              Contact Numbers     Clinic Number:         July 2018 Details    Sun Mon Tue Wed Thu Fri Sat     1               2               3               4               5               6               7                 8               9               10               11               12               13               14                 15               16               17               18               19               20      7.5 mg   See details      21      7.5 mg           22      7.5 mg         23      7.5 mg         24      7.5 mg         25      7.5 mg         26      7.5 mg         27      7.5 mg         28      7.5 mg           29      7.5 mg         30      7.5 mg         31      7.5 mg              Date Details   07/20 This INR check               How to take your warfarin dose     To take:  7.5 mg Take 1.5 of the 5 mg tablets.           August 2018 Details    Sun Mon Tue Wed Thu Fri Sat        1      7.5 mg         2      7.5 mg         3      7.5 mg         4      7.5 mg           5      7.5 mg         6      7.5 mg         7      7.5 mg         8       7.5 mg         9      7.5 mg         10      7.5 mg         11      7.5 mg           12      7.5 mg         13      7.5 mg         14      7.5 mg         15      7.5 mg         16      7.5 mg         17      7.5 mg         18      7.5 mg           19      7.5 mg         20      7.5 mg         21      7.5 mg         22      7.5 mg         23      7.5 mg         24      7.5 mg         25      7.5 mg           26      7.5 mg         27      7.5 mg         28      7.5 mg         29      7.5 mg         30      7.5 mg         31              Date Details   No additional details    Date of next INR:  8/31/2018         How to take your warfarin dose     To take:  7.5 mg Take 1.5 of the 5 mg tablets.

## 2018-07-20 NOTE — PROGRESS NOTES
ANTICOAGULATION FOLLOW-UP CLINIC VISIT    Patient Name:  Barry Herrera  Date:  7/20/2018  Contact Type:  Face to Face    SUBJECTIVE:     Patient Findings     Positives No Problem Findings           OBJECTIVE    INR Protime   Date Value Ref Range Status   07/20/2018 2.0 (A) 0.86 - 1.14 Final       ASSESSMENT / PLAN  INR assessment THER    Recheck INR In: 6 WEEKS    INR Location Clinic      Anticoagulation Summary as of 7/20/2018     INR goal 2.0-3.0   Today's INR 2.0   Warfarin maintenance plan 7.5 mg (5 mg x 1.5) every day   Full warfarin instructions 7.5 mg every day   Weekly warfarin total 52.5 mg   No change documented Carline Reynoso RN   Plan last modified Lois Claudio RN (6/16/2017)   Next INR check 8/31/2018   Target end date     Indications   Long-term (current) use of anticoagulants [Z79.01] [Z79.01]  DVT (deep venous thrombosis) (H) (Resolved) [I82.409]         Anticoagulation Episode Summary     INR check location [facility]    Preferred lab     Send INR reminders to  [facility]    Comments       Anticoagulation Care Providers     Provider Role Specialty Phone number    Josette Sarmiento MD  Family Practice [phone]            See the Encounter Report to view Anticoagulation Flowsheet and Dosing Calendar (Go to Encounters tab in chart review, and find the Anticoagulation Therapy Visit)    INR  therapeutic at 2.0 today.  Continue  7.5 mg daily. Recheck in 6 weeks or sooner if problems/concerns. Reminded of 24/7 nurse line if questions or concerns. Scheduled the patient for yearly med check with Dr. Sarmiento.      Carline Reynoso, RN

## 2018-07-30 ENCOUNTER — TRANSFERRED RECORDS (OUTPATIENT)
Dept: FAMILY MEDICINE | Facility: CLINIC | Age: 53
End: 2018-07-30

## 2018-08-03 ENCOUNTER — OFFICE VISIT (OUTPATIENT)
Dept: FAMILY MEDICINE | Facility: CLINIC | Age: 53
End: 2018-08-03
Payer: COMMERCIAL

## 2018-08-03 VITALS
HEIGHT: 71 IN | TEMPERATURE: 96.8 F | DIASTOLIC BLOOD PRESSURE: 70 MMHG | WEIGHT: 213 LBS | SYSTOLIC BLOOD PRESSURE: 100 MMHG | HEART RATE: 57 BPM | BODY MASS INDEX: 29.82 KG/M2

## 2018-08-03 DIAGNOSIS — I82.4Y2 DEEP VEIN THROMBOSIS (DVT) OF PROXIMAL VEIN OF LEFT LOWER EXTREMITY, UNSPECIFIED CHRONICITY (H): ICD-10-CM

## 2018-08-03 DIAGNOSIS — Z79.01 LONG-TERM (CURRENT) USE OF ANTICOAGULANTS: ICD-10-CM

## 2018-08-03 PROCEDURE — 99213 OFFICE O/P EST LOW 20 MIN: CPT | Performed by: FAMILY MEDICINE

## 2018-08-03 RX ORDER — WARFARIN SODIUM 5 MG/1
TABLET ORAL
Qty: 90 TABLET | Refills: 1 | Status: SHIPPED | OUTPATIENT
Start: 2018-08-03 | End: 2018-12-21

## 2018-08-03 RX ORDER — WARFARIN SODIUM 5 MG/1
TABLET ORAL
Qty: 50 TABLET | Refills: 0 | Status: CANCELLED | OUTPATIENT
Start: 2018-08-03

## 2018-08-03 NOTE — PROGRESS NOTES
SUBJECTIVE:   Barry Herrera is a 52 year old male who presents to clinic today for the following health issues:      Medication Followup of Jantoven     Taking Medication as prescribed: yes    Side Effects:  None    Medication Helping Symptoms:  yes     INR managed by [facility]. INR is within goal.   No recurrent DVT while on Coumadin.    Problem list and histories reviewed & adjusted, as indicated.  Additional history: as documented    Patient Active Problem List   Diagnosis     Diverticulosis of large intestine     HORSESHOE KIDNEY     Premature ejaculation     CARDIOVASCULAR SCREENING; LDL GOAL LESS THAN 160     Health Care Home     Long-term (current) use of anticoagulants [Z79.01]     Deep vein thrombosis (DVT) of other vein of left lower extremity     Past Surgical History:   Procedure Laterality Date     C APPENDECTOMY  7/77     HC DUPLEX EXTREMITY VENOUS, LIMITED UNILATERAL  3/2009    left occlusive thrombus groin to calf involving superficial femoral vein, popliteal veins as well as proximal peroneal and post tibial calf veins       Social History   Substance Use Topics     Smoking status: Never Smoker     Smokeless tobacco: Never Used     Alcohol use No      Comment: approx 2 drinks a month     Family History   Problem Relation Age of Onset     Blood Disease Brother      Factor V Leiden neg     Cardiovascular Brother      two DVT     Breast Cancer Mother      Aneurysm Father      brain - stroke         Current Outpatient Prescriptions   Medication Sig Dispense Refill     JANTOVEN 5 MG tablet Take 7.5 mg (1 1/2 tablet) daily or as directed by [facility] 90 tablet 1     Allergies   Allergen Reactions     No Known Drug Allergies        Reviewed and updated as needed this visit by clinical staff  Tobacco  Allergies  Meds       Reviewed and updated as needed this visit by Provider  Allergies  Meds         ROS:  CONSTITUTIONAL: NEGATIVE for fever, chills, change in weight  ENT/MOUTH: NEGATIVE for ear, mouth and  "throat problems  RESP: NEGATIVE for significant cough or SOB  CV: NEGATIVE for chest pain, palpitations or peripheral edema    OBJECTIVE:                                                    /70 (BP Location: Left arm, Patient Position: Chair, Cuff Size: Adult Regular)  Pulse 57  Temp 96.8  F (36  C) (Tympanic)  Ht 5' 11\" (1.803 m)  Wt 213 lb (96.6 kg)  BMI 29.71 kg/m2  Body mass index is 29.71 kg/(m^2).   GENERAL: healthy, alert, well nourished, well hydrated, no distress  HENT: ear canals- normal; TMs- normal; Nose- normal; Mouth- no ulcers, no lesions  NECK: no tenderness, no adenopathy, no asymmetry, no masses, no stiffness; thyroid- normal to palpation  RESP: lungs clear to auscultation - no rales, no rhonchi, no wheezes  CV: regular rates and rhythm, normal S1 S2, no S3 or S4 and no murmur, no click or rub -  ABDOMEN: soft, no tenderness, no  hepatosplenomegaly, no masses, normal bowel sounds         ASSESSMENT/PLAN:                                                        ICD-10-CM    1. Long-term (current) use of anticoagulants [Z79.01] Z79.01 JANTOVEN 5 MG tablet     INR CLINIC REFERRAL   2. Deep vein thrombosis (DVT) of proximal vein of left lower extremity, unspecified chronicity (H) I82.4Y2 JANTOVEN 5 MG tablet     INR CLINIC REFERRAL       Resume Coumadin for life.  INR clinic referral ordered.  INR is within therapeutic range.      Josette Sarmiento MD  [facility]  "

## 2018-08-03 NOTE — MR AVS SNAPSHOT
After Visit Summary   8/3/2018    Barry Herrera    MRN: [id number]           Patient Information     Date Of Birth          1965        Visit Information        Provider Department      8/3/2018 11:40 AM Josette Sarmiento MD [facility]        Today's Diagnoses     Long-term (current) use of anticoagulants [Z79.01]        Deep vein thrombosis (DVT) of proximal vein of left lower extremity, unspecified chronicity (H)           Follow-ups after your visit        Additional Services     INR CLINIC REFERRAL       Your provider has referred you to INR Services.    Please be aware that coverage of these services is subject to the terms and limitations of your health insurance plan.  Call member services at your health plan with any benefit or coverage questions.    Indication for Anticoagulation: DVT (recurrent)  If nonstandard INR is desired, indicate goal range and explanation: 2-3  Expected Duration of Therapy: Lifetime                  Follow-up notes from your care team     Return in about 1 year (around 8/3/2019) for Annual Physical Exam.      Your next 10 appointments already scheduled     Aug 31, 2018  8:00 AM CDT   Anticoagulation Visit with [facility]   [facility] ([facility])    [address] [place] [id number] [phone]              Who to contact     If you have questions or need follow up information about today's clinic visit or your schedule please contact [facility] directly at [phone].  Normal or non-critical lab and imaging results will be communicated to you by MyChart, letter or phone within 4 business days after the clinic has received the results. If you do not hear from us within 7 days, please contact the clinic through MyChart or phone. If you have a critical or abnormal lab result, we will notify you by phone as soon as possible.  Submit refill requests  "through [employer] or call your pharmacy and they will forward the refill request to us. Please allow 3 business days for your refill to be completed.          Additional Information About Your Visit        [employer] Information     [employer] gives you secure access to your electronic health record. If you see a primary care provider, you can also send messages to your care team and make appointments. If you have questions, please call your primary care clinic.  If you do not have a primary care provider, please call [phone] and they will assist you.        Care EveryWhere ID     This is your Care EveryWhere ID. This could be used by other organizations to access your [facility] medical records  [id number]        Your Vitals Were     Pulse Temperature Height BMI (Body Mass Index)          57 96.8  F (36  C) (Tympanic) 5' 11\" (1.803 m) 29.71 kg/m2         Blood Pressure from Last 3 Encounters:   08/03/18 100/70   05/10/17 122/70   04/27/17 112/78    Weight from Last 3 Encounters:   08/03/18 213 lb (96.6 kg)   05/10/17 203 lb (92.1 kg)   04/27/17 211 lb 6.4 oz (95.9 kg)              We Performed the Following     INR CLINIC REFERRAL          Today's Medication Changes          These changes are accurate as of 8/3/18 12:04 PM.  If you have any questions, ask your nurse or doctor.               These medicines have changed or have updated prescriptions.        Dose/Directions    JANTOVEN 5 MG tablet   This may have changed:  Another medication with the same name was removed. Continue taking this medication, and follow the directions you see here.   Used for:  Long-term (current) use of anticoagulants, Deep vein thrombosis (DVT) of proximal vein of left lower extremity, unspecified chronicity (H)   Generic drug:  warfarin   Changed by:  Josette Sarmiento MD        Take 7.5 mg (1 1/2 tablet) daily or as directed by [facility]   Quantity:  90 tablet   Refills:  1            Where to get your medicines      [id number] - [place], [place] - [address] AT [facility] OF  [address]  [address], [place] [address]     Phone:  [phone]     JANTOVEN 5 MG tablet                Primary Care Provider Office Phone # Fax #    Josette Sarmiento [phone]       [address]  [place] [address]        Equal Access to Services     [facility]: Hadii aad ku hadasho Soomaali, waaxda luqadaha, qaybta kaalmada adeegyada, waxay idiin hayaan adeeg kharash laJanaan . So [facility] [phone].    ATENCIÓN: Si habla español, tiene a haywood disposición servicios gratuitos de asistencia lingüística. [facility] [phone].    We comply with applicable federal civil rights laws and [place] laws. We do not discriminate on the basis of race, color, national origin, age, disability, sex, sexual orientation, or gender identity.            Thank you!     Thank you for choosing [facility] [place]  for your care. Our goal is always to provide you with excellent care. Hearing back from our patients is one way we can continue to improve our services. Please take a few minutes to complete the written survey that you may receive in the mail after your visit with us. Thank you!             Your Updated Medication List - Protect others around you: Learn how to safely use, store and throw away your medicines at www.disposemymeds.org.          This list is accurate as of 8/3/18 12:04 PM.  Always use your most recent med list.                   Brand Name Dispense Instructions for use Diagnosis    JANTOVEN 5 MG tablet   Generic drug:  warfarin     90 tablet    Take 7.5 mg (1 1/2 tablet) daily or as directed by [facility]    Long-term (current) use of anticoagulants, Deep vein thrombosis (DVT) of proximal vein of left lower extremity, unspecified chronicity (H)

## 2018-08-31 ENCOUNTER — ANTICOAGULATION THERAPY VISIT (OUTPATIENT)
Dept: NURSING | Facility: CLINIC | Age: 53
End: 2018-08-31
Payer: COMMERCIAL

## 2018-08-31 DIAGNOSIS — Z79.01 LONG-TERM (CURRENT) USE OF ANTICOAGULANTS: ICD-10-CM

## 2018-08-31 LAB — INR POINT OF CARE: 2.3 (ref 0.86–1.14)

## 2018-08-31 PROCEDURE — 99207 ZZC NO CHARGE NURSE ONLY: CPT

## 2018-08-31 PROCEDURE — 85610 PROTHROMBIN TIME: CPT | Mod: QW

## 2018-08-31 PROCEDURE — 36416 COLLJ CAPILLARY BLOOD SPEC: CPT

## 2018-08-31 NOTE — PROGRESS NOTES
ANTICOAGULATION FOLLOW-UP CLINIC VISIT    Patient Name:  Barry Herrera  Date:  8/31/2018  Contact Type:  Face to Face    SUBJECTIVE:     Patient Findings     Positives No Problem Findings           OBJECTIVE    INR Protime   Date Value Ref Range Status   08/31/2018 2.3 (A) 0.86 - 1.14 Final       ASSESSMENT / PLAN  INR assessment THER    Recheck INR In: 6 WEEKS    INR Location Clinic      Anticoagulation Summary as of 8/31/2018     INR goal 2.0-3.0   Today's INR 2.3   Warfarin maintenance plan 7.5 mg (5 mg x 1.5) every day   Full warfarin instructions 7.5 mg every day   Weekly warfarin total 52.5 mg   No change documented Carline Reynoso RN   Plan last modified Lois Claudio RN (6/16/2017)   Next INR check 10/12/2018   Target end date     Indications   Long-term (current) use of anticoagulants [Z79.01] [Z79.01]  DVT (deep venous thrombosis) (H) (Resolved) [I82.409]         Anticoagulation Episode Summary     [facility] check location [facility]    Preferred lab     Send INR reminders to  [facility]    Comments       Anticoagulation Care Providers     Provider Role Specialty Phone number    Josette Sarmiento MD  Family Practice [phone]            See the Encounter Report to view Anticoagulation Flowsheet and Dosing Calendar (Go to Encounters tab in chart review, and find the Anticoagulation Therapy Visit)    See anticoagulation summary above. INR today is therapeutic. Patient to continue warfarin 52.5 mg weekly.   Reviewed signs of bleeding, clotting and when to seek medical attention.   Advised to call for possible need to recheck INR sooner if change in medical condition, infection, or medication, diet or supplement changes.  Reminded of 24/7 nurse line if questions or concerns.  Also reminded the patient to notify the Anticoagulation Clinic if a procedure is scheduled that may require a hold of warfarin. Patient agrees with plan.      Carline Reynoso, COTY

## 2018-08-31 NOTE — MR AVS SNAPSHOT
Barry Herrera   8/31/2018 8:00 AM   Anticoagulation Therapy Visit    Description:  52 year old male   Provider:  [facility]   Department:  [facility]           INR as of 8/31/2018     Today's INR 2.3      Anticoagulation Summary as of 8/31/2018     INR goal 2.0-3.0   Today's INR 2.3   Full warfarin instructions 7.5 mg every day   Next INR check 10/12/2018    Indications   Long-term (current) use of anticoagulants [Z79.01] [Z79.01]  DVT (deep venous thrombosis) (H) (Resolved) [I82.409]         Your next Anticoagulation Clinic appointment(s)     Oct 12, 2018  8:00 AM CDT   [facility]tico[facility] Visit with [facility]   [facility] ([address] [address]   [phone]              Contact Numbers     Clinic Number:         August 2018 Details    Sun Mon Tue Wed Thu Fri Sat        1               2               3               4                 5               6               7               8               9               10               11                 12               13               14               15               16               17               18                 19               20               21               22               23               24               25                 26               27               28               29               30               31      7.5 mg   See details        Date Details   08/31 This INR check               How to take your warfarin dose     To take:  7.5 mg Take 1.5 of the 5 mg tablets.           September 2018 Details    Sun Mon Tue Wed Thu Fri Sat           1      7.5 mg           2      7.5 mg         3      7.5 mg         4      7.5 mg         5      7.5 mg         6      7.5 mg         7      7.5 mg         8      7.5 mg           9      7.5 mg         10      7.5 mg         11      7.5 mg         12      7.5 mg         13      7.5 mg         14       7.5 mg         15      7.5 mg           16      7.5 mg         17      7.5 mg         18      7.5 mg         19      7.5 mg         20      7.5 mg         21      7.5 mg         22      7.5 mg           23      7.5 mg         24      7.5 mg         25      7.5 mg         26      7.5 mg         27      7.5 mg         28      7.5 mg         29      7.5 mg           30      7.5 mg                Date Details   No additional details            How to take your warfarin dose     To take:  7.5 mg Take 1.5 of the 5 mg tablets.           October 2018 Details    Sun Mon Tue Wed Thu Fri Sat      1      7.5 mg         2      7.5 mg         3      7.5 mg         4      7.5 mg         5      7.5 mg         6      7.5 mg           7      7.5 mg         8      7.5 mg         9      7.5 mg         10      7.5 mg         11      7.5 mg         12            13                 14               15               16               17               18               19               20                 21               22               23               24               25               26               27                 28               29               30               31                   Date Details   No additional details    Date of next INR:  10/12/2018         How to take your warfarin dose     To take:  7.5 mg Take 1.5 of the 5 mg tablets.

## 2018-10-12 ENCOUNTER — ANTICOAGULATION THERAPY VISIT (OUTPATIENT)
Dept: NURSING | Facility: CLINIC | Age: 53
End: 2018-10-12
Payer: COMMERCIAL

## 2018-10-12 LAB — INR POINT OF CARE: 2.8 (ref 0.86–1.14)

## 2018-10-12 PROCEDURE — 36416 COLLJ CAPILLARY BLOOD SPEC: CPT

## 2018-10-12 PROCEDURE — 85610 PROTHROMBIN TIME: CPT | Mod: QW

## 2018-10-12 PROCEDURE — 99207 ZZC NO CHARGE NURSE ONLY: CPT

## 2018-10-12 NOTE — MR AVS SNAPSHOT
Barry Herrera   10/12/2018 8:00 AM   Anticoagulation Therapy Visit    Description:  52 year old male   Provider:  [facility]   Department:  [facility] Nurse           INR as of 10/12/2018     Today's INR 2.8      Anticoagulation Summary as of 10/12/2018     INR goal 2.0-3.0   Today's INR 2.8   Full warfarin instructions 7.5 mg every day   Next INR check 11/16/2018    Indications   Long-term (current) use of anticoagulants [Z79.01] [Z79.01]  DVT (deep venous thrombosis) (H) (Resolved) [I82.409]         Your next Anticoagulation Clinic appointment(s)     Nov 16, 2018  8:00 AM CST   [facility]ticoagulation Visit with [facility]   [facility] [place] [address]   [phone]              Contact Numbers     Clinic Number:         October 2018 Details    Sun Mon Tue Wed Thu Fri Sat      1               2               3               4               5               6                 7               8               9               10               11               12      7.5 mg   See details      13      7.5 mg           14      7.5 mg         15      7.5 mg         16      7.5 mg         17      7.5 mg         18      7.5 mg         19      7.5 mg         20      7.5 mg           21      7.5 mg         22      7.5 mg         23      7.5 mg         24      7.5 mg         25      7.5 mg         26      7.5 mg         27      7.5 mg           28      7.5 mg         29      7.5 mg         30      7.5 mg         31      7.5 mg             Date Details   10/12 This INR check               How to take your warfarin dose     To take:  7.5 mg Take 1.5 of the 5 mg tablets.           November 2018 Details    Sun Mon Tue Wed Thu Fri Sat         1      7.5 mg         2      7.5 mg         3      7.5 mg           4      7.5 mg         5      7.5 mg         6      7.5 mg         7      7.5 mg         8      7.5 mg         9       7.5 mg         10      7.5 mg           11      7.5 mg         12      7.5 mg         13      7.5 mg         14      7.5 mg         15      7.5 mg         16            17                 18               19               20               21               22               23               24                 25               26               27               28               29               30                 Date Details   No additional details    Date of next INR:  11/16/2018         How to take your warfarin dose     To take:  7.5 mg Take 1.5 of the 5 mg tablets.

## 2018-10-12 NOTE — PROGRESS NOTES
ANTICOAGULATION FOLLOW-UP CLINIC VISIT    Patient Name:  Barry Herrera  Date:  10/12/2018  Contact Type:  Face to Face    SUBJECTIVE:     Patient Findings     Positives No Problem Findings           OBJECTIVE    INR Protime   Date Value Ref Range Status   10/12/2018 2.8 (A) 0.86 - 1.14 Final       ASSESSMENT / PLAN  INR assessment THER    Recheck INR In: 6 WEEKS    INR Location Clinic      Anticoagulation Summary as of 10/12/2018     INR goal 2.0-3.0   Today's INR 2.8   Warfarin maintenance plan 7.5 mg (5 mg x 1.5) every day   Full warfarin instructions 7.5 mg every day   Weekly warfarin total 52.5 mg   No change documented Debbie Galeana RN   Plan last modified Lois Claudio RN (6/16/2017)   Next INR check 11/16/2018   Target end date     Indications   Long-term (current) use of anticoagulants [Z79.01] [Z79.01]  DVT (deep venous thrombosis) (H) (Resolved) [I82.409]         Anticoagulation Episode Summary     [facility]    Preferred lab     Send INR reminders to [facility]    Comments       Anticoagulation Care Providers     Provider Role Specialty Phone number    Josette Sarmiento MD  Family Practice [phone]            See the Encounter Report to view Anticoagulation Flowsheet and Dosing Calendar (Go to Encounters tab in chart review, and find the Anticoagulation Therapy Visit)    Patient INR is therapeutic today.  Will continue weekly maintenance dose of 52.5 mg and follow up in 6 weeks or sooner if there are any concerns or problems.      Debbie Brito RN

## 2018-11-16 ENCOUNTER — ANTICOAGULATION THERAPY VISIT (OUTPATIENT)
Dept: NURSING | Facility: CLINIC | Age: 53
End: 2018-11-16
Payer: COMMERCIAL

## 2018-11-16 LAB — INR POINT OF CARE: 3.4 (ref 0.86–1.14)

## 2018-11-16 PROCEDURE — 99207 ZZC NO CHARGE NURSE ONLY: CPT

## 2018-11-16 PROCEDURE — 85610 PROTHROMBIN TIME: CPT | Mod: QW

## 2018-11-16 PROCEDURE — 36416 COLLJ CAPILLARY BLOOD SPEC: CPT

## 2018-11-16 NOTE — MR AVS SNAPSHOT
Barry BARRERA Javier   11/16/2018 8:00 AM   Anticoagulation Therapy Visit    Description:  53 year old male   Provider[facility]  [facility]   Department:  [facility] Nurse           INR as of 11/16/2018     Today's INR 3.4!      Anticoagulation Summary as of 11/16/2018     INR goal 2.0-3.0   Today's INR 3.4!   Full warfarin instructions 11/16: 5 mg; Otherwise 7.5 mg every day   Next INR check 12/7/2018    Indications   Long-term (current) use of anticoagulants [Z79.01] [Z79.01]  DVT (deep venous thrombosis) (H) (Resolved) [I82.409]         Your next Anticoagulation Clinic appointment(s)     Dec 07, 2018  8:00 AM CST   Anticoagulation Visit with  ANTICOAGULATION CLINIC   [facility] ([address] [address]   [phone]              Contact Numbers     Clinic Number:         November 2018 Details    Sun Mon Tue Wed Thu Fri Sat         1               2               3                 4               5               6               7               8               9               10                 11               12               13               14               15               16      5 mg   See details      17      7.5 mg           18      7.5 mg         19      7.5 mg         20      7.5 mg         21      7.5 mg         22      7.5 mg         23      7.5 mg         24      7.5 mg           25      7.5 mg         26      7.5 mg         27      7.5 mg         28      7.5 mg         29      7.5 mg         30      7.5 mg           Date Details   11/16 This INR check               How to take your warfarin dose     To take:  5 mg Take 1 of the 5 mg tablets.    To take:  7.5 mg Take 1.5 of the 5 mg tablets.           December 2018 Details    Sun Mon Tue Wed Thu Fri Sat           1      7.5 mg           2      7.5 mg         3      7.5 mg         4      7.5 mg         5      7.5 mg         6      7.5 mg         7            8                  9               10               11               12               13               14               15                 16               17               18               19               20               21               22                 23               24               25               26               27               28               29                 30               31                     Date Details   No additional details    Date of next INR:  12/7/2018         How to take your warfarin dose     To take:  7.5 mg Take 1.5 of the 5 mg tablets.

## 2018-11-16 NOTE — PROGRESS NOTES
ANTICOAGULATION FOLLOW-UP CLINIC VISIT    Patient Name:  Barry Herrera  Date:  11/16/2018  Contact Type:  Face to Face    SUBJECTIVE:     Patient Findings     Positives Change in diet/appetite    Comments Eating less salads and usually has a consistent diet of it.  Eating more soups with the weather change.  Will incorporate more greens.             OBJECTIVE    INR Protime   Date Value Ref Range Status   11/16/2018 3.4 (A) 0.86 - 1.14 Final       ASSESSMENT / PLAN  INR assessment SUPRA    Recheck INR In: 3 WEEKS    INR Location Clinic      Anticoagulation Summary as of 11/16/2018     INR goal 2.0-3.0   Today's INR 3.4!   Warfarin maintenance plan 7.5 mg (5 mg x 1.5) every day   Full warfarin instructions 11/16: 5 mg; Otherwise 7.5 mg every day   Weekly warfarin total 52.5 mg   Plan last modified Lois Claudio, RN (6/16/2017)   Next INR check 12/7/2018   Target end date     Indications   Long-term (current) use of anticoagulants [Z79.01] [Z79.01]  DVT (deep venous thrombosis) (H) (Resolved) [I82.409]         Anticoagulation Episode Summary     [facility]    Preferred lab     Send INR reminders to [facility]    Comments       Anticoagulation Care Providers     Provider Role Specialty Phone number    Josette Sarmiento MD  Family Practice [phone]            See the Encounter Report to view Anticoagulation Flowsheet and Dosing Calendar (Go to Encounters tab in chart review, and find the Anticoagulation Therapy Visit)    Patient INR is supra therapeutic today.  Patient will adjust dosing today by only taking 5 mg and then resume maintenance dosing of 52.5 mg and follow up in 3 weeks or sooner if there are any concerns or problems.    Signs of bleeding and when appropriate to seek care for symptoms reviewed.    Adjustment Rational:   Dosage adjustment made based on physician directed care plan.      Debbie Brito RN

## 2018-12-07 ENCOUNTER — ANTICOAGULATION THERAPY VISIT (OUTPATIENT)
Dept: NURSING | Facility: CLINIC | Age: 53
End: 2018-12-07
Payer: COMMERCIAL

## 2018-12-07 LAB — INR POINT OF CARE: 2.9 (ref 0.86–1.14)

## 2018-12-07 PROCEDURE — 36416 COLLJ CAPILLARY BLOOD SPEC: CPT

## 2018-12-07 PROCEDURE — 85610 PROTHROMBIN TIME: CPT | Mod: QW

## 2018-12-07 PROCEDURE — 99207 ZZC NO CHARGE NURSE ONLY: CPT

## 2018-12-07 NOTE — MR AVS SNAPSHOT
Barry BARRERA Javier   12/7/2018 8:00 AM   Anticoagulation Therapy Visit    Description:  53 year old male   Provider[facility]  [facility]   Department:  [facility] Nurse           INR as of 12/7/2018     Today's INR 2.9      Anticoagulation Summary as of 12/7/2018     INR goal 2.0-3.0   Today's INR 2.9   Full warfarin instructions 7.5 mg every day   Next INR check 1/18/2019    Indications   Long-term (current) use of anticoagulants [Z79.01] [Z79.01]  DVT (deep venous thrombosis) (H) (Resolved) [I82.409]         Your next Anticoagulation Clinic appointment(s)     Jan 18, 2019  8:00 AM CST   [facility] with [facility]   [facility] [place] [address]   [phone]              Contact Numbers     Clinic Number:         December 2018 Details    Sun Mon Tue Wed Thu Fri Sat           1                 2               3               4               5               6               7      7.5 mg   See details      8      7.5 mg           9      7.5 mg         10      7.5 mg         11      7.5 mg         12      7.5 mg         13      7.5 mg         14      7.5 mg         15      7.5 mg           16      7.5 mg         17      7.5 mg         18      7.5 mg         19      7.5 mg         20      7.5 mg         21      7.5 mg         22      7.5 mg           23      7.5 mg         24      7.5 mg         25      7.5 mg         26      7.5 mg         27      7.5 mg         28      7.5 mg         29      7.5 mg           30      7.5 mg         31      7.5 mg               Date Details   12/07 This INR check               How to take your warfarin dose     To take:  7.5 mg Take 1.5 of the 5 mg tablets.           January 2019 Details    Sun Mon Tue Wed Thu Fri Sat       1      7.5 mg         2      7.5 mg         3      7.5 mg         4      7.5 mg         5      7.5 mg           6      7.5 mg         7      7.5 mg          8      7.5 mg         9      7.5 mg         10      7.5 mg         11      7.5 mg         12      7.5 mg           13      7.5 mg         14      7.5 mg         15      7.5 mg         16      7.5 mg         17      7.5 mg         18            19                 20               21               22               23               24               25               26                 27               28               29               30               31                  Date Details   No additional details    Date of next INR:  1/18/2019         How to take your warfarin dose     To take:  7.5 mg Take 1.5 of the 5 mg tablets.

## 2018-12-07 NOTE — PROGRESS NOTES
ANTICOAGULATION FOLLOW-UP CLINIC VISIT    Patient Name:  Barry Herrera  Date:  12/7/2018  Contact Type:  Face to Face    SUBJECTIVE:     Patient Findings     Positives No Problem Findings           OBJECTIVE    INR Protime   Date Value Ref Range Status   12/07/2018 2.9 (A) 0.86 - 1.14 Final       ASSESSMENT / PLAN  INR assessment THER    Recheck INR In: 6 WEEKS    INR Location Clinic      Anticoagulation Summary as of 12/7/2018     INR goal 2.0-3.0   Today's INR 2.9   Warfarin maintenance plan 7.5 mg (5 mg x 1.5) every day   Full warfarin instructions 7.5 mg every day   Weekly warfarin total 52.5 mg   No change documented Carline Reynoso RN   Plan last modified Lois Claudio RN (6/16/2017)   Next INR check 1/18/2019   Target end date     Indications   Long-term (current) use of anticoagulants [Z79.01] [Z79.01]  DVT (deep venous thrombosis) (H) (Resolved) [I82.409]         Anticoagulation Episode Summary     [facility] location [facility]    Preferred lab     Send INR reminders to  [facility]    Comments       Anticoagulation Care Providers     Provider Role Specialty Phone number    Josette Sarmiento MD  Family Practice [phone]            See the Encounter Report to view Anticoagulation Flowsheet and Dosing Calendar (Go to Encounters tab in chart review, and find the Anticoagulation Therapy Visit)    INR  therapeutic at 2.9 today.  Continue warfarin all 52.5 mg weekly.  Recheck in 6 weeks or sooner if problems/concerns.       Carline Reynoso, RN

## 2018-12-21 DIAGNOSIS — Z79.01 LONG TERM CURRENT USE OF ANTICOAGULANT THERAPY: ICD-10-CM

## 2018-12-21 DIAGNOSIS — I82.4Y2 DEEP VEIN THROMBOSIS (DVT) OF PROXIMAL VEIN OF LEFT LOWER EXTREMITY, UNSPECIFIED CHRONICITY (H): ICD-10-CM

## 2018-12-21 NOTE — TELEPHONE ENCOUNTER
"Requested Prescriptions   Pending Prescriptions Disp Refills     JANTOVEN 5 MG tablet [Pharmacy Med Name: JANTOVEN 5MG TABLETS (PEACH)] 90 tablet 0    Last Written Prescription Date:  8/3/18  Last Fill Quantity: 90,  # refills: 1   Last office visit: 8/3/2018 with prescribing provider:  YES   Future Office Visit:     Sig: TAKE 1 1/2 TABLETS BY MOUTH EVERY DAY OR AS DIRECTED    Vitamin K Antagonists Failed - 12/21/2018  7:51 AM       Failed - INR is within goal in the past 6 weeks    Confirm INR is within goal in the past 6 weeks.     Recent Labs   Lab Test 12/07/18   INR 2.9*                      Passed - Recent (12 mo) or future (30 days) visit within the authorizing provider's specialty    Patient had office visit in the last 12 months or has a visit in the next 30 days with authorizing provider or within the authorizing provider's specialty.  See \"Patient Info\" tab in inbasket, or \"Choose Columns\" in Meds & Orders section of the refill encounter.             Passed - Patient is 18 years of age or older          "

## 2018-12-26 RX ORDER — WARFARIN SODIUM 5 MG/1
TABLET ORAL
Qty: 135 TABLET | Refills: 0 | Status: SHIPPED | OUTPATIENT
Start: 2018-12-26 | End: 2019-03-21

## 2018-12-26 NOTE — TELEPHONE ENCOUNTER
Prescription approved per [facility] Refill Protocol.  Carline Reynoso RN

## 2019-01-18 ENCOUNTER — ANTICOAGULATION THERAPY VISIT (OUTPATIENT)
Dept: NURSING | Facility: CLINIC | Age: 54
End: 2019-01-18
Payer: COMMERCIAL

## 2019-01-18 LAB — INR POINT OF CARE: 2.1 (ref 0.86–1.14)

## 2019-01-18 PROCEDURE — 36416 COLLJ CAPILLARY BLOOD SPEC: CPT

## 2019-01-18 PROCEDURE — 99207 ZZC NO CHARGE NURSE ONLY: CPT

## 2019-01-18 PROCEDURE — 85610 PROTHROMBIN TIME: CPT | Mod: QW

## 2019-01-18 NOTE — PROGRESS NOTES
ANTICOAGULATION FOLLOW-UP CLINIC VISIT    Patient Name:  Barry Herrera  Date:  2019  Contact Type:  Face to Face    SUBJECTIVE:     Patient Findings     Positives:   No Problem Findings           OBJECTIVE    INR Protime   Date Value Ref Range Status   2019 2.1 (A) 0.86 - 1.14 Final       ASSESSMENT / PLAN  INR assessment THER    Recheck INR In: 6 WEEKS    INR Location Clinic      Anticoagulation Summary  As of 2019    INR goal:   2.0-3.0   TTR:   77.4 % (2.9 y)   INR used for dosin.1 (2019)   Warfarin maintenance plan:   7.5 mg (5 mg x 1.5) every day   Full warfarin instructions:   7.5 mg every day   Weekly warfarin total:   52.5 mg   No change documented:   Carline Reynoso RN   Plan last modified:   Lois Claudio RN (2017)   Next INR check:   3/1/2019   Target end date:       Indications    Long-term (current) use of anticoagulants [Z79.01] [Z79.01]  DVT (deep venous thrombosis) (H) (Resolved) [I82.409]             Anticoagulation Episode Summary     [facility]   [facility]    Preferred lab:       Send INR reminders to:   [facility]    Comments:         Anticoagulation Care Providers     Provider Role Specialty Phone number    Josette Sarmiento MD  Family Practice [phone]            See the Encounter Report to view Anticoagulation Flowsheet and Dosing Calendar (Go to Encounters tab in chart review, and find the Anticoagulation Therapy Visit)    . INR today is therapeutic. Patient to continue warfarin 52.5 mg weekly.   Reviewed signs of bleeding, clotting and when to seek medical attention.   Advised to call for possible need to recheck INR sooner if change in medical condition, infection, or medication, diet or supplement changes.  Reminded of  nurse line if questions or concerns.  Also reminded the patient to notify the Anticoagulation Clinic if a procedure is scheduled that may require a hold of warfarin. Patient agrees with plan.    Carline Reynoso RN

## 2019-02-28 ENCOUNTER — ANTICOAGULATION THERAPY VISIT (OUTPATIENT)
Dept: NURSING | Facility: CLINIC | Age: 54
End: 2019-02-28
Payer: COMMERCIAL

## 2019-02-28 LAB — INR POINT OF CARE: 2 (ref 0.86–1.14)

## 2019-02-28 PROCEDURE — 36416 COLLJ CAPILLARY BLOOD SPEC: CPT

## 2019-02-28 PROCEDURE — 99207 ZZC NO CHARGE NURSE ONLY: CPT

## 2019-02-28 PROCEDURE — 85610 PROTHROMBIN TIME: CPT | Mod: QW

## 2019-02-28 NOTE — PROGRESS NOTES
ANTICOAGULATION FOLLOW-UP CLINIC VISIT    Patient Name:  Barry Herrera  Date:  2019  Contact Type:  Face to Face    SUBJECTIVE:     Patient Findings     Positives:   No Problem Findings           OBJECTIVE    INR Protime   Date Value Ref Range Status   2019 2.0 (A) 0.86 - 1.14 Final       ASSESSMENT / PLAN  INR assessment THER    Recheck INR In: 6 WEEKS    INR Location Clinic      Anticoagulation Summary  As of 2019    INR goal:   2.0-3.0   TTR:   78.3 % (3 y)   INR used for dosin.0 (2019)   Warfarin maintenance plan:   7.5 mg (5 mg x 1.5) every day   Full warfarin instructions:   7.5 mg every day   Weekly warfarin total:   52.5 mg   No change documented:   Fouzia Thompson RN   Plan last modified:   Lois Claudio RN (2017)   Next INR check:   2019   Target end date:       Indications    Long-term (current) use of anticoagulants [Z79.01] [Z79.01]  DVT (deep venous thrombosis) (H) (Resolved) [I82.409]             Anticoagulation Episode Summary     [facility]   [facility]    Preferred lab:       Send INR reminders to:    [facility]    Comments:         Anticoagulation Care Providers     Provider Role Specialty Phone number    Josetet Sarmiento MD  Family Practice [phone]            See the Encounter Report to view Anticoagulation Flowsheet and Dosing Calendar (Go to Encounters tab in chart review, and find the Anticoagulation Therapy Visit)    Patient INR is therapeutic.  Patient will continue to take 52.5 mg weekly dosing and follow up in 6 weeks or sooner for any problems or concerns.        Fouzia Thompson RN

## 2019-03-21 DIAGNOSIS — Z79.01 LONG TERM CURRENT USE OF ANTICOAGULANT THERAPY: ICD-10-CM

## 2019-03-21 DIAGNOSIS — I82.4Y2 DEEP VEIN THROMBOSIS (DVT) OF PROXIMAL VEIN OF LEFT LOWER EXTREMITY, UNSPECIFIED CHRONICITY (H): ICD-10-CM

## 2019-03-22 RX ORDER — WARFARIN SODIUM 5 MG/1
TABLET ORAL
Qty: 135 TABLET | Refills: 0 | Status: SHIPPED | OUTPATIENT
Start: 2019-03-22 | End: 2019-06-04

## 2019-03-22 NOTE — TELEPHONE ENCOUNTER
Prescription approved per [facility] Refill Protocol.      Josephine Gil, BS, RN, N  [facility]) [phone]

## 2019-03-22 NOTE — TELEPHONE ENCOUNTER
"Requested Prescriptions   Pending Prescriptions Disp Refills     JANTOVEN 5 MG tablet [Pharmacy Med Name: JANTOVEN 5MG TABLETS (PEACH)]  Last Written Prescription Date:  12/26/18  Last Fill Quantity: 135,  # refills: 0   Last office visit: 8/3/2018 with prescribing provider:  Torsten   Future Office Visit:     135 tablet 0     Sig: TAKE 1 1/2 TABLETS BY MOUTH EVERY DAY OR AS DIRECTED    Vitamin K Antagonists Failed - 3/21/2019  9:36 PM       Failed - INR is within goal in the past 6 weeks    Confirm INR is within goal in the past 6 weeks.     Recent Labs   Lab Test 02/28/19   INR 2.0*                      Passed - Recent (12 mo) or future (30 days) visit within the authorizing provider's specialty    Patient had office visit in the last 12 months or has a visit in the next 30 days with authorizing provider or within the authorizing provider's specialty.  See \"Patient Info\" tab in inbasket, or \"Choose Columns\" in Meds & Orders section of the refill encounter.             Passed - Medication is active on med list       Passed - Patient is 18 years of age or older          "

## 2019-04-08 ENCOUNTER — APPOINTMENT (OUTPATIENT)
Dept: GENERAL RADIOLOGY | Facility: CLINIC | Age: 54
DRG: 419 | End: 2019-04-08
Attending: EMERGENCY MEDICINE
Payer: COMMERCIAL

## 2019-04-08 ENCOUNTER — HOSPITAL ENCOUNTER (INPATIENT)
Facility: CLINIC | Age: 54
LOS: 1 days | Discharge: HOME | DRG: 419 | End: 2019-04-11
Attending: EMERGENCY MEDICINE | Admitting: INTERNAL MEDICINE
Payer: COMMERCIAL

## 2019-04-08 DIAGNOSIS — K85.10 ACUTE BILIARY PANCREATITIS WITHOUT INFECTION OR NECROSIS: ICD-10-CM

## 2019-04-08 DIAGNOSIS — K85.10 GALLSTONE PANCREATITIS: Primary | ICD-10-CM

## 2019-04-08 DIAGNOSIS — G89.18 ACUTE POST-OPERATIVE PAIN: ICD-10-CM

## 2019-04-08 DIAGNOSIS — R07.9 CHEST PAIN: ICD-10-CM

## 2019-04-08 PROBLEM — Z86.718 HISTORY OF DEEP VENOUS THROMBOSIS: Status: ACTIVE | Noted: 2019-04-08

## 2019-04-08 LAB
ALBUMIN SERPL-MCNC: 4.1 G/DL (ref 3.4–5)
ALP SERPL-CCNC: 77 U/L (ref 40–150)
ALT SERPL W P-5'-P-CCNC: 163 U/L (ref 0–70)
ANION GAP SERPL CALCULATED.3IONS-SCNC: 4 MMOL/L (ref 3–14)
AST SERPL W P-5'-P-CCNC: 241 U/L (ref 0–45)
BASOPHILS # BLD AUTO: 0 10E9/L (ref 0–0.2)
BASOPHILS NFR BLD AUTO: 0.4 %
BILIRUB DIRECT SERPL-MCNC: 0.2 MG/DL (ref 0–0.2)
BILIRUB SERPL-MCNC: 0.7 MG/DL (ref 0.2–1.3)
BUN SERPL-MCNC: 20 MG/DL (ref 7–30)
CALCIUM SERPL-MCNC: 8.8 MG/DL (ref 8.5–10.1)
CHLORIDE SERPL-SCNC: 106 MMOL/L (ref 94–109)
CO2 SERPL-SCNC: 30 MMOL/L (ref 20–32)
CREAT SERPL-MCNC: 1.14 MG/DL (ref 0.66–1.25)
D DIMER PPP FEU-MCNC: <0.3 UG/ML FEU (ref 0–0.5)
DIFFERENTIAL METHOD BLD: ABNORMAL
EOSINOPHIL # BLD AUTO: 0.1 10E9/L (ref 0–0.7)
EOSINOPHIL NFR BLD AUTO: 1.4 %
ERYTHROCYTE [DISTWIDTH] IN BLOOD BY AUTOMATED COUNT: 13.5 % (ref 10–15)
GFR SERPL CREATININE-BSD FRML MDRD: 72 ML/MIN/{1.73_M2}
GLUCOSE SERPL-MCNC: 99 MG/DL (ref 70–99)
HCT VFR BLD AUTO: 39 % (ref 40–53)
HGB BLD-MCNC: 13.5 G/DL (ref 13.3–17.7)
IMM GRANULOCYTES # BLD: 0 10E9/L (ref 0–0.4)
IMM GRANULOCYTES NFR BLD: 0.1 %
INR PPP: 2.03 (ref 0.86–1.14)
INTERPRETATION ECG - MUSE: NORMAL
LYMPHOCYTES # BLD AUTO: 1.5 10E9/L (ref 0.8–5.3)
LYMPHOCYTES NFR BLD AUTO: 20.8 %
MCH RBC QN AUTO: 30.9 PG (ref 26.5–33)
MCHC RBC AUTO-ENTMCNC: 34.6 G/DL (ref 31.5–36.5)
MCV RBC AUTO: 89 FL (ref 78–100)
MONOCYTES # BLD AUTO: 0.6 10E9/L (ref 0–1.3)
MONOCYTES NFR BLD AUTO: 8.6 %
NEUTROPHILS # BLD AUTO: 4.8 10E9/L (ref 1.6–8.3)
NEUTROPHILS NFR BLD AUTO: 68.7 %
NRBC # BLD AUTO: 0 10*3/UL
NRBC BLD AUTO-RTO: 0 /100
PLATELET # BLD AUTO: 151 10E9/L (ref 150–450)
POTASSIUM SERPL-SCNC: 4.2 MMOL/L (ref 3.4–5.3)
PROT SERPL-MCNC: 7.4 G/DL (ref 6.8–8.8)
RBC # BLD AUTO: 4.37 10E12/L (ref 4.4–5.9)
SODIUM SERPL-SCNC: 140 MMOL/L (ref 133–144)
TROPONIN I SERPL-MCNC: <0.015 UG/L (ref 0–0.04)
WBC # BLD AUTO: 7 10E9/L (ref 4–11)

## 2019-04-08 PROCEDURE — 85610 PROTHROMBIN TIME: CPT | Performed by: EMERGENCY MEDICINE

## 2019-04-08 PROCEDURE — 80076 HEPATIC FUNCTION PANEL: CPT | Performed by: EMERGENCY MEDICINE

## 2019-04-08 PROCEDURE — 93005 ELECTROCARDIOGRAM TRACING: CPT | Mod: 76

## 2019-04-08 PROCEDURE — 93005 ELECTROCARDIOGRAM TRACING: CPT

## 2019-04-08 PROCEDURE — 80048 BASIC METABOLIC PNL TOTAL CA: CPT | Performed by: EMERGENCY MEDICINE

## 2019-04-08 PROCEDURE — 25000128 H RX IP 250 OP 636: Performed by: EMERGENCY MEDICINE

## 2019-04-08 PROCEDURE — 85025 COMPLETE CBC W/AUTO DIFF WBC: CPT | Performed by: EMERGENCY MEDICINE

## 2019-04-08 PROCEDURE — 25000132 ZZH RX MED GY IP 250 OP 250 PS 637: Performed by: EMERGENCY MEDICINE

## 2019-04-08 PROCEDURE — 85379 FIBRIN DEGRADATION QUANT: CPT | Performed by: EMERGENCY MEDICINE

## 2019-04-08 PROCEDURE — 84484 ASSAY OF TROPONIN QUANT: CPT | Performed by: EMERGENCY MEDICINE

## 2019-04-08 PROCEDURE — 96374 THER/PROPH/DIAG INJ IV PUSH: CPT | Mod: 59

## 2019-04-08 PROCEDURE — 83690 ASSAY OF LIPASE: CPT | Performed by: EMERGENCY MEDICINE

## 2019-04-08 PROCEDURE — 71046 X-RAY EXAM CHEST 2 VIEWS: CPT

## 2019-04-08 PROCEDURE — 96375 TX/PRO/DX INJ NEW DRUG ADDON: CPT

## 2019-04-08 PROCEDURE — 99285 EMERGENCY DEPT VISIT HI MDM: CPT | Mod: 25

## 2019-04-08 RX ORDER — NITROGLYCERIN 0.4 MG/1
0.4 TABLET SUBLINGUAL EVERY 5 MIN PRN
Status: DISCONTINUED | OUTPATIENT
Start: 2019-04-08 | End: 2019-04-11 | Stop reason: HOSPADM

## 2019-04-08 RX ORDER — ONDANSETRON 2 MG/ML
4 INJECTION INTRAMUSCULAR; INTRAVENOUS EVERY 30 MIN PRN
Status: DISCONTINUED | OUTPATIENT
Start: 2019-04-08 | End: 2019-04-09

## 2019-04-08 RX ORDER — HYDROMORPHONE HYDROCHLORIDE 1 MG/ML
0.5 INJECTION, SOLUTION INTRAMUSCULAR; INTRAVENOUS; SUBCUTANEOUS
Status: DISCONTINUED | OUTPATIENT
Start: 2019-04-08 | End: 2019-04-09

## 2019-04-08 RX ORDER — ASPIRIN 81 MG/1
162 TABLET, CHEWABLE ORAL ONCE
Status: COMPLETED | OUTPATIENT
Start: 2019-04-08 | End: 2019-04-08

## 2019-04-08 RX ADMIN — Medication 0.5 MG: at 23:32

## 2019-04-08 RX ADMIN — NITROGLYCERIN 0.4 MG: 0.4 TABLET SUBLINGUAL at 21:52

## 2019-04-08 RX ADMIN — ONDANSETRON 4 MG: 2 INJECTION INTRAMUSCULAR; INTRAVENOUS at 22:56

## 2019-04-08 RX ADMIN — NITROGLYCERIN 0.4 MG: 0.4 TABLET SUBLINGUAL at 22:23

## 2019-04-08 RX ADMIN — ASPIRIN 81 MG 162 MG: 81 TABLET ORAL at 21:52

## 2019-04-08 ASSESSMENT — MIFFLIN-ST. JEOR: SCORE: 1746.64

## 2019-04-09 ENCOUNTER — APPOINTMENT (OUTPATIENT)
Dept: CT IMAGING | Facility: CLINIC | Age: 54
DRG: 419 | End: 2019-04-09
Attending: INTERNAL MEDICINE
Payer: COMMERCIAL

## 2019-04-09 ENCOUNTER — APPOINTMENT (OUTPATIENT)
Dept: ULTRASOUND IMAGING | Facility: CLINIC | Age: 54
DRG: 419 | End: 2019-04-09
Attending: INTERNAL MEDICINE
Payer: COMMERCIAL

## 2019-04-09 PROBLEM — R10.13 ABDOMINAL PAIN, EPIGASTRIC: Status: ACTIVE | Noted: 2019-04-09

## 2019-04-09 PROBLEM — R10.13 EPIGASTRIC PAIN: Status: ACTIVE | Noted: 2019-04-09

## 2019-04-09 LAB
INR PPP: 2.05 (ref 0.86–1.14)
INTERPRETATION ECG - MUSE: NORMAL
LIPASE SERPL-CCNC: ABNORMAL U/L (ref 73–393)
TROPONIN I SERPL-MCNC: <0.015 UG/L (ref 0–0.04)

## 2019-04-09 PROCEDURE — 74177 CT ABD & PELVIS W/CONTRAST: CPT

## 2019-04-09 PROCEDURE — 25000132 ZZH RX MED GY IP 250 OP 250 PS 637: Performed by: INTERNAL MEDICINE

## 2019-04-09 PROCEDURE — 99220 ZZC INITIAL OBSERVATION CARE,LEVL III: CPT | Performed by: INTERNAL MEDICINE

## 2019-04-09 PROCEDURE — G0378 HOSPITAL OBSERVATION PER HR: HCPCS

## 2019-04-09 PROCEDURE — 96376 TX/PRO/DX INJ SAME DRUG ADON: CPT

## 2019-04-09 PROCEDURE — 25000128 H RX IP 250 OP 636: Performed by: EMERGENCY MEDICINE

## 2019-04-09 PROCEDURE — 84484 ASSAY OF TROPONIN QUANT: CPT | Performed by: INTERNAL MEDICINE

## 2019-04-09 PROCEDURE — 25000125 ZZHC RX 250: Performed by: EMERGENCY MEDICINE

## 2019-04-09 PROCEDURE — 36415 COLL VENOUS BLD VENIPUNCTURE: CPT | Performed by: INTERNAL MEDICINE

## 2019-04-09 PROCEDURE — 25000128 H RX IP 250 OP 636: Performed by: STUDENT IN AN ORGANIZED HEALTH CARE EDUCATION/TRAINING PROGRAM

## 2019-04-09 PROCEDURE — 25800030 ZZH RX IP 258 OP 636: Performed by: INTERNAL MEDICINE

## 2019-04-09 PROCEDURE — 85610 PROTHROMBIN TIME: CPT | Performed by: INTERNAL MEDICINE

## 2019-04-09 PROCEDURE — 76705 ECHO EXAM OF ABDOMEN: CPT

## 2019-04-09 RX ORDER — HYDROMORPHONE HYDROCHLORIDE 1 MG/ML
.5-1 INJECTION, SOLUTION INTRAMUSCULAR; INTRAVENOUS; SUBCUTANEOUS
Status: DISCONTINUED | OUTPATIENT
Start: 2019-04-09 | End: 2019-04-09

## 2019-04-09 RX ORDER — NALOXONE HYDROCHLORIDE 0.4 MG/ML
.1-.4 INJECTION, SOLUTION INTRAMUSCULAR; INTRAVENOUS; SUBCUTANEOUS
Status: DISCONTINUED | OUTPATIENT
Start: 2019-04-09 | End: 2019-04-10

## 2019-04-09 RX ORDER — SODIUM CHLORIDE 9 MG/ML
1000 INJECTION, SOLUTION INTRAVENOUS CONTINUOUS
Status: DISCONTINUED | OUTPATIENT
Start: 2019-04-09 | End: 2019-04-11

## 2019-04-09 RX ORDER — IOPAMIDOL 755 MG/ML
98 INJECTION, SOLUTION INTRAVASCULAR ONCE
Status: COMPLETED | OUTPATIENT
Start: 2019-04-09 | End: 2019-04-09

## 2019-04-09 RX ORDER — LIDOCAINE 40 MG/G
CREAM TOPICAL
Status: DISCONTINUED | OUTPATIENT
Start: 2019-04-09 | End: 2019-04-11 | Stop reason: HOSPADM

## 2019-04-09 RX ORDER — ACETAMINOPHEN 650 MG/1
650 SUPPOSITORY RECTAL EVERY 4 HOURS PRN
Status: DISCONTINUED | OUTPATIENT
Start: 2019-04-09 | End: 2019-04-09

## 2019-04-09 RX ORDER — ACETAMINOPHEN 325 MG/1
650 TABLET ORAL EVERY 4 HOURS PRN
Status: DISCONTINUED | OUTPATIENT
Start: 2019-04-09 | End: 2019-04-11 | Stop reason: HOSPADM

## 2019-04-09 RX ORDER — HYDROCODONE BITARTRATE AND ACETAMINOPHEN 5; 325 MG/1; MG/1
1-2 TABLET ORAL EVERY 4 HOURS PRN
Status: DISCONTINUED | OUTPATIENT
Start: 2019-04-09 | End: 2019-04-11 | Stop reason: HOSPADM

## 2019-04-09 RX ORDER — ONDANSETRON 2 MG/ML
4 INJECTION INTRAMUSCULAR; INTRAVENOUS EVERY 6 HOURS PRN
Status: DISCONTINUED | OUTPATIENT
Start: 2019-04-09 | End: 2019-04-11 | Stop reason: HOSPADM

## 2019-04-09 RX ORDER — HYDROMORPHONE HYDROCHLORIDE 1 MG/ML
.3-.5 INJECTION, SOLUTION INTRAMUSCULAR; INTRAVENOUS; SUBCUTANEOUS EVERY 4 HOURS PRN
Status: DISCONTINUED | OUTPATIENT
Start: 2019-04-09 | End: 2019-04-11 | Stop reason: HOSPADM

## 2019-04-09 RX ORDER — ONDANSETRON 4 MG/1
4 TABLET, ORALLY DISINTEGRATING ORAL EVERY 6 HOURS PRN
Status: DISCONTINUED | OUTPATIENT
Start: 2019-04-09 | End: 2019-04-11 | Stop reason: HOSPADM

## 2019-04-09 RX ADMIN — SODIUM CHLORIDE 70 ML: 9 INJECTION, SOLUTION INTRAVENOUS at 01:02

## 2019-04-09 RX ADMIN — DEXTROSE AND SODIUM CHLORIDE: 5; 900 INJECTION, SOLUTION INTRAVENOUS at 14:59

## 2019-04-09 RX ADMIN — HYDROCODONE BITARTRATE AND ACETAMINOPHEN 1 TABLET: 5; 325 TABLET ORAL at 15:01

## 2019-04-09 RX ADMIN — HYDROMORPHONE HYDROCHLORIDE 1 MG: 1 INJECTION, SOLUTION INTRAMUSCULAR; INTRAVENOUS; SUBCUTANEOUS at 00:44

## 2019-04-09 RX ADMIN — DEXTROSE AND SODIUM CHLORIDE: 5; 900 INJECTION, SOLUTION INTRAVENOUS at 01:39

## 2019-04-09 RX ADMIN — Medication 0.3 MG: at 23:36

## 2019-04-09 RX ADMIN — IOPAMIDOL 98 ML: 755 INJECTION, SOLUTION INTRAVENOUS at 01:02

## 2019-04-09 RX ADMIN — DEXTROSE AND SODIUM CHLORIDE: 5; 900 INJECTION, SOLUTION INTRAVENOUS at 08:17

## 2019-04-09 RX ADMIN — DEXTROSE AND SODIUM CHLORIDE: 5; 900 INJECTION, SOLUTION INTRAVENOUS at 21:40

## 2019-04-09 ASSESSMENT — ENCOUNTER SYMPTOMS
NAUSEA: 0
DIAPHORESIS: 0
SHORTNESS OF BREATH: 0

## 2019-04-09 ASSESSMENT — MIFFLIN-ST. JEOR: SCORE: 1741.2

## 2019-04-09 NOTE — ED NOTES
"[facility]  ED Nurse Handoff Report    ED Chief complaint: Chest Pain (started an hr ago, lower chest, midsternal )      ED Diagnosis:   Final diagnoses:   None       Code Status: Full Code    Allergies:   Allergies   Allergen Reactions     No Known Drug Allergies        Activity level - Baseline/Home:  Independent    Activity Level - Current:   Independent     Needed?: No    Isolation: No  Infection: Not Applicable  Bariatric?: No    Vital Signs:   Vitals:    04/08/19 2048   BP: 149/77   Pulse: 59   Resp: 16   Temp: 98.5  F (36.9  C)   TempSrc: Oral   SpO2: 98%   Weight: 88.5 kg (195 lb)   Height: 1.803 m (5' 11\")       Cardiac Rhythm: ,        Pain level: 0-10 Pain Scale: 6    Is this patient confused?: No   Does this patient have a guardian?  No         If yes, is there guardianship documents in the Epic \"Code/ACP\" activity?  N/A         Guardian Notified?  N/A  [place] - Suicide Severity Rating Scale Completed?  Yes  If yes, what color did the patient score?  White    Patient Report: Initial Complaint: pt presents with sudden onset CP that started while at rest  Focused Assessment: Pt is A & O X 4, independent with care, denies change in CP status with nitroglycerin, VSS. Pain is below chest, upper abdomen. Nausea.   Tests Performed: labs, EKG. xray  Abnormal Results: LFT  Treatments provided: asa,nitroglycerin x 2, dilaudid, zofran.     Family Comments: wife at bedside    OBS brochure/video discussed/provided to patient/family: yes               Name of person given brochure if not patient:               Relationship to patient:     ED Medications:   Medications   nitroGLYcerin (NITROSTAT) sublingual tablet 0.4 mg (0.4 mg Sublingual Given 4/8/19 2223)   aspirin (ASA) chewable tablet 162 mg (162 mg Oral Given 4/8/19 2152)       Drips infusing?:  No    For the majority of the shift this patient was Green.   Interventions performed were .    Severe Sepsis OR Septic Shock Diagnosis " Present: No    To be done/followed up on inpatient unit:      ED NURSE PHONE NUMBER: [id number]

## 2019-04-09 NOTE — CONSULTS
[facility]  Gastroenterology Consultation         Barry Herrera  [id number]  [place] [address]  53 year old male    Admission Date/Time: 4/8/2019  Primary Care Provider: Josette Sarmiento  Referring / Attending Physician:  Dr. Ephraim Doshi    We were asked to see the patient in consultation by Dr. Doshi for evaluation of acute pancreatitis.      CC: Acute pancreatitis    HPI:  Barry Herrera is a 53 year old male past medical history significant for DVT who was admitted through ER due to severe abdominal pain in the epigastric area.  During his evaluation patient was found to have elevated pancreatic function tests lipase of 32,000 and AST ALT in 200 range.  CT scan of the abdomen showed similar findings of inflamed pancreas with peripancreatic edema.  Patient's ultrasound of the abdomen showed possible polyps versus sludge or small stones in the gallbladder common bile duct is normal size.  Patient has history of sleep apnea patient had similar episode about 5 days ago which woke him up from sleep which resolved spontaneously.  Patient is currently laying in the bed patient appears fairly comfortable however patient is still complaining of diffuse uncomfort in 4-5/10 pain patient was given clear liquids.  Patient is denying any fever chills chest pain shortness awake alert    ROS: A comprehensive ten point review of systems was negative aside from those in mentioned in the HPI.      PAST MED HX:  I have reviewed this patient's medical history and updated it with pertinent information if needed.   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 Taking?   JANTOVEN 5 MG tablet 4/8/2019 at Unknown time Self No Yes   Sig: TAKE 1 1/2 TABLETS BY MOUTH EVERY DAY OR AS DIRECTED      Facility-Administered Medications: None       ALLERGIES:   Allergies   Allergen Reactions     No Known Drug Allergies        SOCIAL HISTORY:  Social History     Tobacco Use     Smoking status: Never Smoker     Smokeless tobacco: Never Used   Substance Use Topics     Alcohol use: No     Comment: approx 2 drinks a month     Drug use: No       FAMILY HISTORY:  Family History   Problem Relation Age of Onset     Blood Disease Brother         Factor V Leiden neg     Cardiovascular Brother         two DVT     Breast Cancer Mother      Aneurysm Father         brain - stroke       PHYSICAL EXAM:   General somewhat uncomfortable  Vital Signs with Ranges  Temp: 96.9  F (36.1  C) Temp src: Oral BP: 152/75 Pulse: 59 Heart Rate: 57 Resp: 16 SpO2: 99 % O2 Device: None (Room air)    No intake/output data recorded.    Constitutional: healthy, alert and no distress   Cardiovascular: negative, PMI normal. No lifts, heaves, or thrills. RRR. No murmurs, clicks gallops or rub  Respiratory: negative, Percussion normal. Good diaphragmatic excursion. Lungs clear  Head: Normocephalic. No masses, lesions, tenderness or abnormalities  Neck: Neck supple. No adenopathy. Thyroid symmetric, normal size,, Carotids without bruits.  Abdomen: Abdomen soft, non-tender. BS normal. No masses, organomegaly  SKIN: no suspicious lesions or rashes          ADDITIONAL COMMENTS:   I reviewed the patient's new clinical lab test results.   Recent Labs   Lab Test 04/09/19  0821 04/08/19  2130 02/28/19  07/22/15  1235  02/25/13  1543   WBC  --  7.0  --   --  9.3  --  10.0   HGB  --  13.5  --   --  13.3  --  14.2   MCV  --  89  --   --  91  --  89   PLT  --  151  --   --  147*  --  173   INR 2.05* 2.03* 2.0*   < >  --    < >  --      < > = values in this interval not displayed.     Recent Labs   Lab Test 04/08/19 2130 01/24/11  1508   POTASSIUM 4.2 3.7   CHLORIDE 106 103   CO2 30 27   BUN 20 17   ANIONGAP 4 9     Recent Labs   Lab Test 04/08/19  2130 07/22/15  1235 02/25/13  1543   ALBUMIN 4.1  --   --    BILITOTAL 0.7  --   --    *  --   --    *  --   --    PROTEIN  --  Negative Negative   LIPASE 32,283*  --   --        I reviewed the patient's new imaging results.        CONSULTATION ASSESSMENT AND PLAN:    Active Problems:    Severe obstructive sleep apnea    Assessment:     53-year-old gentleman with history of DVT for which patient has been on Coumadin for the last 15 years with history of acute onset of severe abdominal pain with elevated liver function tests along with pancreatic function tests.  Patient workup so far include ultrasound of the abdomenWith multiple echogenic foci and the wall of the gallbladder up to 3 mm finding suggestive of possible polyps versus small stones.  Patient current history and recurrent bout is quite consistent with recurrent biliary colic and possible gallstone pancreatitis.  Given patient's normal bilirubin and nondilated ducts I suspect patient has less likely of choledocholithiasis.  I would recommend the patient to be continued on current plan of n.p.o. IV fluid IV pain control I would recommend him to be evaluated by general surgery.  Other options would include upper GI endoscopy and endoscopic ultrasound to evaluate biliary system.  Patient will need to have his INR reversed prior to endoscopic procedure with INR to be less than 1.5.  Plan was discussed in detail with the patient patient probably will be in the hospital for the next 24-48 hours.  Thank you very much for letting me participate in his care.          Clint Real MD, FACP  [facility].  Office: [phone]  Cell : [phone]

## 2019-04-09 NOTE — H&P
[facility]    History and Physical  Hospitalist       Date of Admission:  4/8/2019    Assessment & Plan   Barry Herrera is a 53 year old male with history of  Deep vein thrombosis in the past on warfarin presents with chest/abdominal pain     Active Problems:  Abdominal pain, epigastric  Acute pancreatitis  Elevated liver fucntion test  With normal alp   --- admit to observation   ---npo, iv fluids , lipase significantly elevated   ---iv narcotics for pain control, discussed with  Emergency room  Physician will order CT abdomen in emergency room  To evaluate for gall stones, cbd and pancreas  ---patient  Uses alcohol only occasionally with no recent use .  --- If his symptoms do not improve by tomorrow he needs to be changed to inpatient.  History of deep venous thrombosis  ---continue warfarin, pharmacy to dose   Severe obstructive sleep apnea  ---continue cpap at 7cm h2o    DVT Prophylaxis: Low Risk/Ambulatory with no VTE prophylaxis indicated  Code Status: Full Code    Disposition: Expected discharge in 24 hours     Niki Sanchez MD    Primary Care Physician   Josette Sarmiento    Chief Complaint   Epigastric pain 4 hours    History is obtained from the patient  Medical records    History of Present Illness   Barry Herrera is a 53 year old male with a past medical history significant for severe sleep apnea, DVT-unprovoked on lifelong warfarin therapy presents to the emergency department with acute onset of epigastric pain.  The patient had dinner tonight and he went for a block with his dog on his way back home he rested and suddenly developed epigastric abdominal pain that is radiating to his back.  The pain was so severe that he presented to the emergency department.  He denied having any nausea in the beginning but currently complains of some nausea.  He had received IV Dilaudid prior to me meeting with him.  He  was asked to be admitted for chest pain rule out by the ED physician, his LFTs  were ordered later which were mildly elevated and the lipase came back is 32,000.  No prior history of pancreatitis, he uses alcohol only occasionally and the last drink was last week.  The patient is on warfarin since 2009 following an unprovoked DVT and his INR is therapeutic in the ER.  He has a negative chest x-ray and a unremarkable EKG.  First set of troponins are negative.    Past Medical History    I have reviewed this patient's medical history and updated it with pertinent information if needed.   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  Past Surgical History:   Procedure Laterality Date     C APPENDECTOMY  7/77     HC DUPLEX EXTREMITY VENOUS, LIMITED UNILATERAL  3/2009    left occlusive thrombus groin to calf involving superficial femoral vein, popliteal veins as well as proximal peroneal and post tibial calf veins       Prior to Admission Medications   Prior to Admission Medications   Prescriptions Last Dose Informant Patient Reported? Taking?   JANTOVEN 5 MG tablet   No No   Sig: TAKE 1 1/2 TABLETS BY MOUTH EVERY DAY OR AS DIRECTED      Facility-Administered Medications: None     Allergies   Allergies   Allergen Reactions     No Known Drug Allergies        Social History   I have reviewed this patient's social history and updated it with pertinent information if needed. Barry BARRERA Javier  reports that he has never smoked. He has never used smokeless tobacco. He reports that he does not drink alcohol or use drugs.    Family  History   I have reviewed this patient's family history and updated it with pertinent information if needed.   Family History   Problem Relation Age of Onset     Blood Disease Brother         Factor V Leiden neg     Cardiovascular Brother         two DVT     Breast Cancer Mother      Aneurysm Father         brain - stroke       Review of Systems   The 10 point Review of Systems is negative other than noted in the HPI or here.     Physical Exam   Temp: 98.5  F (36.9  C) Temp src: Oral BP: 139/82 Pulse: 56 Heart Rate: 56 Resp: 22 SpO2: 98 % O2 Device: None (Room air)    Vital Signs with Ranges  Temp:  [98.5  F (36.9  C)] 98.5  F (36.9  C)  Pulse:  [56-62] 56  Heart Rate:  [56-59] 56  Resp:  [14-22] 22  BP: (139-166)/(77-92) 139/82  SpO2:  [98 %] 98 %  195 lbs 0 oz    Constitutional: Awake, alert, cooperative, no apparent distress.  Eyes: Conjunctiva and pupils examined and normal.  HEENT: Moist mucous membranes, normal dentition.  Respiratory: Clear to auscultation bilaterally, no crackles or wheezing.  Cardiovascular: Regular rate and rhythm, normal S1 and S2, and no murmur noted.  GI: Soft, distended, tender epigastrium noted on deep palpation , normal bowel sounds.  Lymph/Hematologic: No anterior cervical or supraclavicular adenopathy.  Skin: No rashes, no cyanosis, no edema.  Musculoskeletal: No joint swelling, erythema or tenderness.  Neurologic: Cranial nerves 2-12 intact, normal strength and sensation.  Psychiatric: Alert, oriented to person, place and time, no obvious anxiety or depression.    Data   Data reviewed today:    Recent Labs   Lab 04/08/19  2130   WBC 7.0   HGB 13.5   MCV 89      INR 2.03*      POTASSIUM 4.2   CHLORIDE 106   CO2 30   BUN 20   CR 1.14   ANIONGAP 4   CARLOS 8.8   GLC 99   ALBUMIN 4.1   PROTTOTAL 7.4   BILITOTAL 0.7   ALKPHOS 77   *   *   TROPI <0.015       Imaging:  Recent Results (from the past 24 hour(s))   XR Chest 2 Views    Narrative    XR CHEST 2 VW  4/8/2019 10:03 PM     HISTORY: cp      Impression    IMPRESSION: Negative exam.    MARYANN HERNÁNDEZ MD

## 2019-04-09 NOTE — PROGRESS NOTES
"PRIMARY DIAGNOSIS: \"GENERIC\" NURSING/ACUTE PANCREATITIS  OUTPATIENT/OBSERVATION GOALS TO BE MET BEFORE DISCHARGE:  1. ADLs back to baseline: Yes    2. Activity and level of assistance: Ambulating independently.    3. Pain status: Improved-controlled with oral pain medications.    4. Return to near baseline physical activity: Yes    5.   Diagnostic tests and consults completed and resulted: Not met, GI consult ordered     Discharge Planner Nurse   Safe discharge environment identified: Yes  Barriers to discharge: Yes       Entered by: Karissa Coleman 04/09/2019 5:00 PM     Please review provider order for any additional goals.   Nurse to notify provider when observation goals have been met and patient is ready for discharge.          "

## 2019-04-09 NOTE — PROGRESS NOTES
Observation goals: PRIOR TO DISCHARGE  Comments:      -diagnostic tests and consults completed and resulted: Not met, US Abdomen limited completed, result pending     -vital signs normal or at patient baseline: partially met, states chest pain/abd pain getting better.

## 2019-04-09 NOTE — PROGRESS NOTES
BRIEF IM PROGRESS NOTE    Reviewed H&P    CT ABDOMEN  IMPRESSION: Acute pancreatitis: There is mild to moderate  peripancreatic stranding. No evidence of pancreatic necrosis or  Pseudocyst.    Limited US ABDOMEN             IMPRESSION:   1.  Multiple echogenic gallbladder wall foci could be small  cholesterol deposits versus small polyps.  2.  Significant overlying bowel gas completely obscures the pancreas  and left hepatic lobe.    Acute pancreatitis  Assessment: no clear etiology, CT/US Abdomen without acute findings. Passed gallstone? Idiopathic? Lipase [id number] is quite high. Very minimal alcohol use.    Plan:   - Would like GI to evaluate  - Continue MVIF  - Pain control as needed  - Full Liquid diet tonight, would not advance further this evening    Please refer to Dr. Sanchez' HPI for other issues    Ephraim Doshi MD

## 2019-04-09 NOTE — PROGRESS NOTES
A&O. VSS,RA. C/O some abd discomfort and chest tightness, PRN Norco x1.. IND in room. Voiding. Clear liquid diet, advance to full liquid diet now. IV infusing. US abd completed and resulted. GI consult pending. Plan for discharge tomorrow. Continue to monitor.

## 2019-04-09 NOTE — PHARMACY-ADMISSION MEDICATION HISTORY
Admission medication history interview status for the 4/8/2019  admission is complete. See EPIC admission navigator for prior to admission medications     Medication history source reliability:Good    Actions taken by pharmacist (provider contacted, etc): Chart review. Face to face interview with patient      Additional medication history information not noted on PTA med list :None    Medication reconciliation/reorder completed by provider prior to medication history? No    Time spent in this activity: 5 minutes    Prior to Admission medications    Medication Sig Last Dose Taking? Auth Provider   JANTOVEN 5 MG tablet TAKE 1 1/2 TABLETS BY MOUTH EVERY DAY OR AS DIRECTED 4/8/2019 at Unknown time Yes Josette Sarmiento MD

## 2019-04-09 NOTE — PLAN OF CARE
A&Ox4. VSS on RA. Independent in room. NPO. R Abd pain 4-5/10, declined medical interventions. GI consult completed, suspecting gallstone pancreatitis, possible surgery, upper endoscopy, endoscopic US, see Dr. Real's note. IVF infusing. Surgery consult scheduled. Denies nausea, dizziness, lightheadedness. Family updated about plan of care. Continue to monitor.

## 2019-04-09 NOTE — PLAN OF CARE
Observation goals: PRIOR TO DISCHARGE  Comments:     -diagnostic tests and consults completed and resulted: Not met, US Abdomen limited for today    -vital signs normal or at patient baseline: partially met, states chest pain decreasing       Came to floor around 0130. A&Ox4. VSS on RA. Up SBA. NPO. C/o 2/10 chest pain, states consistent pain, declines interventions. INR 2.03, D-dimer <0.3 & trops <0.015. IVF @ 150ml/hr. Plan for US abdomen limited today. Continue to monitor.

## 2019-04-09 NOTE — PHARMACY-ANTICOAGULATION SERVICE
Clinical Pharmacy - Warfarin Dosing Consult     Pharmacy has been consulted to manage this patient s warfarin therapy.  Indication: DVT/ PE Treatment  Therapy Goal: INR 2-3  Warfarin Prior to Admission: Yes  Warfarin PTA Regimen: 7.5 mg daily.    INR   Date Value Ref Range Status   04/09/2019 2.05 (H) 0.86 - 1.14 Final   04/08/2019 2.03 (H) 0.86 - 1.14 Final       Recommend warfarin 7.5 mg today.  Pharmacy will monitor Barry Herrera daily and order warfarin doses to achieve specified goal.      Please contact pharmacy as soon as possible if the warfarin needs to be held for a procedure or if the warfarin goals change.      Dunia Zavala, PharmD, BCPS

## 2019-04-09 NOTE — PROGRESS NOTES
-diagnostic tests and consults completed and resulted: Met     -vital signs normal or at patient baseline: partially met, states chest pain/abd pain getting better, declined intervention.

## 2019-04-09 NOTE — PROGRESS NOTES
Dr Doshi called and said we can start patient on clear liquid this afternoon and monitor for abdominal discomfort. He will stay 1 more night. Patient updated.

## 2019-04-09 NOTE — PROGRESS NOTES
Observation goals: PRIOR TO DISCHARGE  Comments:     -diagnostic tests and consults completed and resulted: Not met, US Abdomen limited for today    -vital signs normal or at patient baseline: partially met, states chest pain decreasing

## 2019-04-09 NOTE — ED PROVIDER NOTES
History     Chief Complaint:  Chest Pain      HPI   Barry Herrera is a 53 year old male with history of DVT and currently on Coumadin who presents to the emergency department for evaluation of chest pain. Patient states that he developed chest tightness/pain about 1 hours ago in his lower midsternal chest.  The onset was while sitting on the couch.  He states that he felt the pain once before, which awoke him frmo sleep 3-4 days ago. He denies arm pain, jaw pain, nausea, diaphoresis, or shortness of breath.  He denies abdominal pain.  Patient currently rates his pain as 6/10.  He reports that 2 brothers have had blood clots 1 of which was related to an injury.  Testing has not revealed any specific cause of the clotting.    Cardiac/PE/DVT Risk Factors:  History of hypertension - neg  History of hyperlipidemia - neg  History of diabetes - neg  History of smoking - neg  Personal history of PE/DVT - neg  Family history of PE/DVT - neg  Family history of heart complications - neg  Recent travel - neg  Recent surgery - neg  Cancer - neg      Allergies:  No known Drug Allergies     Medications:    Jantoven     Past Medical History:    Allergic rhinitis, cause unspecified   Chronic Low Back Pain  Diverticulosis of colon (without mention of hemorrhage)  DVT (deep venous thrombosis)   Other specified congenital anomaly of kidney  Premature ejaculation     Past Surgical History:    Appendectomy  Duplex extremity venous     Family History:    Brother- factor 5, two DVT  Mother- breast cancer  Father- brain aneurysm     Social History:  Marital Status:   [2]  Social History     Tobacco Use     Smoking status: Never Smoker     Smokeless tobacco: Never Used   Substance Use Topics     Alcohol use: No     Comment: approx 2 drinks a month     Drug use: No        Review of Systems   Constitutional: Negative for diaphoresis.   Respiratory: Negative for shortness of breath.    Cardiovascular: Positive for chest pain.  "  Gastrointestinal: Negative for nausea.   All other systems reviewed and are negative.        Physical Exam   First Vitals:  BP: 149/77  Pulse: 59  Heart Rate: 59  Temp: 98.5  F (36.9  C)  Resp: 16  Height: 180.3 cm (5' 11\")  Weight: 88.5 kg (195 lb)  SpO2: 98 %      Physical Exam  Constitutional:  Oriented to person, place, and time.      Appears well-developed and well-nourished.   HENT:   Head:    Normocephalic and atraumatic.   Right Ear:   Tympanic membrane and external ear normal.   Left Ear:   Tympanic membrane and external ear normal.   Mouth/Throat:   Oropharynx is clear and moist.      Mucous membranes are normal.   Eyes:    Conjunctivae normal and EOM are normal.      Pupils are equal, round, and reactive to light.   Neck:    Normal range of motion. Neck supple.   Cardiovascular:  Normal rate, regular rhythm, S1 normal and S2 normal.      No gallop and no friction rub. No murmur heard.  Pulmonary/Chest:  Breath sounds normal. No respiratory distress.      No wheezes. No rhonchi. No rales.   Abdominal:   Soft. No hepatosplenomegaly. No tenderness.      No rebound and no CVA tenderness.   Musculoskeletal:  Normal range of motion.   Neurological:   Alert and oriented to person, place, and time. Normal strength.      GCS eye subscore is 4. GCS verbal subscore is 5.      GCS motor subscore is 6.   Skin:    Skin is warm and dry.   Psychiatric:   Normal mood and affect.      Speech is normal and behavior is normal.      Judgment and thought content normal.      Cognition and memory are normal.      Emergency Department Course     ECG:  ECG taken at 2054, ECG read at 2057  Normal sinus rhythm  Left axis deviation  Minimal voltage criteria for LVH, may be normal variant  Abnormal ECG  Rate 64 bpm. MI interval 172 ms. QRS duration 106 ms. QT/QTc 392/404 ms. P-R-T axes 16 -32 20.    ECG taken at 2303, ECG read at 2307  Sinus bradycardia  Normal ECG  Rate 86 bpm. MI interval 178. QRS duration 100. QT/QTc 416/401. " P-R-T axes 55 -29 17.     Imaging:  Radiology findings were communicated with the patient who voiced understanding of the findings.      XR Chest 2 Views  Final Result  IMPRESSION: Negative exam.  MARYANN HERNÁNDEZ MD    Reading per radiology     Laboratory:  Laboratory findings were communicated with the patient who voiced understanding of the findings.    CBC: WBC 7, HGB 13.5,   BMP: o/w WNL (Creatinine 1.14)  Troponin (Collected 2130): <0.015  INR: 2.03 (H)   D dimer: <0.3  Hepatic panel:  (H),  (H)  Lipase: 32,283    Interventions:  nitroglycerin o.4 mg sublingual   Aspirin 162 mg PO     Emergency Department Course:  Nursing notes and vitals reviewed.  I performed an exam of the patient as documented above.   The patient had nitroglycerin it did not have any relief with 2 nitroglycerin.  He then developed increased pain and nausea to 8 on a scale 1-10.  A second EKG was done.  This showed no change.  He was reevaluated.  He did not have any abdominal pain or tenderness.   I felt he need to be admitted and therefore added on a hepatic panel and a lipase.  I discussed the treatment plan with the patient. They expressed understanding of this plan and consented to admission. I discussed the patient with Dr. Sanchez, who will admit the patient to a monitored bed for further evaluation and treatment.     I personally reviewed the imaging and lab results with the Patient and answered all related questions prior to admission for observation.  Impression & Plan      Medical Decision Making:  The patient is a 53-year-old male with history remarkable for DVT on Coumadin who comes in with 1 to 1-1/2 hours of chest pain which he describes as tightness.  He notes a similar episode a couple nights ago.  He has no other associated pain.  His EKG and laboratory work was unremarkable.  He did not have any relief with nitroglycerin.  While he was here he had some nausea and his pain increased to 8 out of 10.   Second EKG also negative.  D-dimer was done with some delay but was unremarkable so doubt PE.  But due to unrelieved pain I felt the patient to be admitted for further evaluation and treatment.  Before I talked to Dr. Sanchez I did add on hepatic panel and a lipase.  The AST and ALT have come back elevated.  I have talked with her and she has already noted this and is ordering a CT scan from the floor.  Lipase is still pending which she will follow-up on.  Because of patient's pains could certainly be gallbladder or pancreas although he has no abdominal pain or tenderness.  He is primarily presented with chest pain.  Cardiac ischemia seems lower likelihood at this time.  Patient admitted to Dr. Sanchez on observation.  Addendum: His lipase is come back very elevated at 32,000.  I have discussed this with Dr. Sanchez and his CT is ordered from the ED and she will follow-up on the results.  The CT scan did show pancreatitis.  The patient denies alcohol use.  Bile ducts were not dilated.  He was given 1 mg of Dilaudid before being sent up to the floor.  Diagnosis:    ICD-10-CM    1. Chest pain R07.9 Hepatic panel     Disposition:   Admission for observation    Scribe Disclosure:  I, Regis Flynnl, am serving as a scribe at 10:23 PM on 4/8/2019 to document services personally performed by Janie Abernathy MD, based on my observations and the provider's statements to me.    EMERGENCY DEPARTMENT       Janie Abernathy MD  04/09/19 0140

## 2019-04-10 ENCOUNTER — APPOINTMENT (OUTPATIENT)
Dept: GENERAL RADIOLOGY | Facility: CLINIC | Age: 54
DRG: 419 | End: 2019-04-10
Attending: SURGERY
Payer: COMMERCIAL

## 2019-04-10 ENCOUNTER — ANESTHESIA (OUTPATIENT)
Dept: SURGERY | Facility: CLINIC | Age: 54
DRG: 419 | End: 2019-04-10
Payer: COMMERCIAL

## 2019-04-10 ENCOUNTER — ANESTHESIA EVENT (OUTPATIENT)
Dept: SURGERY | Facility: CLINIC | Age: 54
DRG: 419 | End: 2019-04-10
Payer: COMMERCIAL

## 2019-04-10 PROBLEM — K85.90 PANCREATITIS: Status: ACTIVE | Noted: 2019-04-10

## 2019-04-10 LAB
ALBUMIN SERPL-MCNC: 3.3 G/DL (ref 3.4–5)
ALP SERPL-CCNC: 70 U/L (ref 40–150)
ALT SERPL W P-5'-P-CCNC: 210 U/L (ref 0–70)
ANION GAP SERPL CALCULATED.3IONS-SCNC: 2 MMOL/L (ref 3–14)
AST SERPL W P-5'-P-CCNC: 86 U/L (ref 0–45)
BILIRUB DIRECT SERPL-MCNC: 0.2 MG/DL (ref 0–0.2)
BILIRUB SERPL-MCNC: 0.6 MG/DL (ref 0.2–1.3)
BUN SERPL-MCNC: 6 MG/DL (ref 7–30)
CALCIUM SERPL-MCNC: 8.2 MG/DL (ref 8.5–10.1)
CHLORIDE SERPL-SCNC: 112 MMOL/L (ref 94–109)
CO2 SERPL-SCNC: 28 MMOL/L (ref 20–32)
CREAT SERPL-MCNC: 0.9 MG/DL (ref 0.66–1.25)
ERYTHROCYTE [DISTWIDTH] IN BLOOD BY AUTOMATED COUNT: 14 % (ref 10–15)
GFR SERPL CREATININE-BSD FRML MDRD: >90 ML/MIN/{1.73_M2}
GLUCOSE SERPL-MCNC: 108 MG/DL (ref 70–99)
HCT VFR BLD AUTO: 37.1 % (ref 40–53)
HGB BLD-MCNC: 12.5 G/DL (ref 13.3–17.7)
INR PPP: 1.65 (ref 0.86–1.14)
LIPASE SERPL-CCNC: 2645 U/L (ref 73–393)
MCH RBC QN AUTO: 30.6 PG (ref 26.5–33)
MCHC RBC AUTO-ENTMCNC: 33.7 G/DL (ref 31.5–36.5)
MCV RBC AUTO: 91 FL (ref 78–100)
PLATELET # BLD AUTO: 112 10E9/L (ref 150–450)
POTASSIUM SERPL-SCNC: 4.4 MMOL/L (ref 3.4–5.3)
PROT SERPL-MCNC: 6.1 G/DL (ref 6.8–8.8)
RBC # BLD AUTO: 4.09 10E12/L (ref 4.4–5.9)
SODIUM SERPL-SCNC: 142 MMOL/L (ref 133–144)
WBC # BLD AUTO: 5.3 10E9/L (ref 4–11)

## 2019-04-10 PROCEDURE — 40000170 ZZH STATISTIC PRE-PROCEDURE ASSESSMENT II: Performed by: SURGERY

## 2019-04-10 PROCEDURE — 25000128 H RX IP 250 OP 636: Performed by: SURGERY

## 2019-04-10 PROCEDURE — 0FT44ZZ RESECTION OF GALLBLADDER, PERCUTANEOUS ENDOSCOPIC APPROACH: ICD-10-PCS | Performed by: SURGERY

## 2019-04-10 PROCEDURE — 25800030 ZZH RX IP 258 OP 636: Performed by: NURSE ANESTHETIST, CERTIFIED REGISTERED

## 2019-04-10 PROCEDURE — 25000128 H RX IP 250 OP 636: Performed by: NURSE ANESTHETIST, CERTIFIED REGISTERED

## 2019-04-10 PROCEDURE — 36415 COLL VENOUS BLD VENIPUNCTURE: CPT | Performed by: INTERNAL MEDICINE

## 2019-04-10 PROCEDURE — 88304 TISSUE EXAM BY PATHOLOGIST: CPT | Performed by: SURGERY

## 2019-04-10 PROCEDURE — 99204 OFFICE O/P NEW MOD 45 MIN: CPT | Mod: 57 | Performed by: SURGERY

## 2019-04-10 PROCEDURE — 25000125 ZZHC RX 250: Performed by: NURSE ANESTHETIST, CERTIFIED REGISTERED

## 2019-04-10 PROCEDURE — 25000132 ZZH RX MED GY IP 250 OP 250 PS 637: Performed by: PHYSICIAN ASSISTANT

## 2019-04-10 PROCEDURE — 71000012 ZZH RECOVERY PHASE 1 LEVEL 1 FIRST HR: Performed by: SURGERY

## 2019-04-10 PROCEDURE — BF141ZZ FLUOROSCOPY OF GALLBLADDER, BILE DUCTS AND PANCREATIC DUCTS USING LOW OSMOLAR CONTRAST: ICD-10-PCS | Performed by: SURGERY

## 2019-04-10 PROCEDURE — 25000128 H RX IP 250 OP 636: Performed by: STUDENT IN AN ORGANIZED HEALTH CARE EDUCATION/TRAINING PROGRAM

## 2019-04-10 PROCEDURE — 25000566 ZZH SEVOFLURANE, EA 15 MIN: Performed by: SURGERY

## 2019-04-10 PROCEDURE — 47563 LAPARO CHOLECYSTECTOMY/GRAPH: CPT | Mod: AS | Performed by: PHYSICIAN ASSISTANT

## 2019-04-10 PROCEDURE — 25000128 H RX IP 250 OP 636: Performed by: INTERNAL MEDICINE

## 2019-04-10 PROCEDURE — 80053 COMPREHEN METABOLIC PANEL: CPT | Performed by: INTERNAL MEDICINE

## 2019-04-10 PROCEDURE — 37000008 ZZH ANESTHESIA TECHNICAL FEE, 1ST 30 MIN: Performed by: SURGERY

## 2019-04-10 PROCEDURE — 25000128 H RX IP 250 OP 636: Performed by: PHYSICIAN ASSISTANT

## 2019-04-10 PROCEDURE — 85610 PROTHROMBIN TIME: CPT | Performed by: INTERNAL MEDICINE

## 2019-04-10 PROCEDURE — 40000277 XR SURGERY CARM FLUORO LESS THAN 5 MIN W STILLS

## 2019-04-10 PROCEDURE — 99233 SBSQ HOSP IP/OBS HIGH 50: CPT | Performed by: PHYSICIAN ASSISTANT

## 2019-04-10 PROCEDURE — 37000009 ZZH ANESTHESIA TECHNICAL FEE, EACH ADDTL 15 MIN: Performed by: SURGERY

## 2019-04-10 PROCEDURE — 25800025 ZZH RX 258: Performed by: SURGERY

## 2019-04-10 PROCEDURE — 25800030 ZZH RX IP 258 OP 636: Performed by: PHYSICIAN ASSISTANT

## 2019-04-10 PROCEDURE — 36000063 ZZH SURGERY LEVEL 4 EA 15 ADDTL MIN: Performed by: SURGERY

## 2019-04-10 PROCEDURE — 96376 TX/PRO/DX INJ SAME DRUG ADON: CPT

## 2019-04-10 PROCEDURE — 85027 COMPLETE CBC AUTOMATED: CPT | Performed by: INTERNAL MEDICINE

## 2019-04-10 PROCEDURE — G0378 HOSPITAL OBSERVATION PER HR: HCPCS

## 2019-04-10 PROCEDURE — 82248 BILIRUBIN DIRECT: CPT | Performed by: INTERNAL MEDICINE

## 2019-04-10 PROCEDURE — 25800030 ZZH RX IP 258 OP 636: Performed by: INTERNAL MEDICINE

## 2019-04-10 PROCEDURE — 25000128 H RX IP 250 OP 636: Performed by: ANESTHESIOLOGY

## 2019-04-10 PROCEDURE — 25800030 ZZH RX IP 258 OP 636: Performed by: ANESTHESIOLOGY

## 2019-04-10 PROCEDURE — 12000000 ZZH R&B MED SURG/OB

## 2019-04-10 PROCEDURE — 47563 LAPARO CHOLECYSTECTOMY/GRAPH: CPT | Performed by: SURGERY

## 2019-04-10 PROCEDURE — 27210794 ZZH OR GENERAL SUPPLY STERILE: Performed by: SURGERY

## 2019-04-10 PROCEDURE — 36000093 ZZH SURGERY LEVEL 4 1ST 30 MIN: Performed by: SURGERY

## 2019-04-10 PROCEDURE — 25000125 ZZHC RX 250: Performed by: SURGERY

## 2019-04-10 PROCEDURE — 83690 ASSAY OF LIPASE: CPT | Performed by: INTERNAL MEDICINE

## 2019-04-10 PROCEDURE — 25800030 ZZH RX IP 258 OP 636: Performed by: SURGERY

## 2019-04-10 PROCEDURE — 83690 ASSAY OF LIPASE: CPT | Performed by: SURGERY

## 2019-04-10 RX ORDER — ALBUTEROL SULFATE 0.83 MG/ML
2.5 SOLUTION RESPIRATORY (INHALATION) EVERY 4 HOURS PRN
Status: DISCONTINUED | OUTPATIENT
Start: 2019-04-10 | End: 2019-04-10 | Stop reason: HOSPADM

## 2019-04-10 RX ORDER — ONDANSETRON 2 MG/ML
4 INJECTION INTRAMUSCULAR; INTRAVENOUS EVERY 30 MIN PRN
Status: DISCONTINUED | OUTPATIENT
Start: 2019-04-10 | End: 2019-04-10 | Stop reason: HOSPADM

## 2019-04-10 RX ORDER — PROPOFOL 10 MG/ML
INJECTION, EMULSION INTRAVENOUS PRN
Status: DISCONTINUED | OUTPATIENT
Start: 2019-04-10 | End: 2019-04-10

## 2019-04-10 RX ORDER — SODIUM CHLORIDE, SODIUM LACTATE, POTASSIUM CHLORIDE, CALCIUM CHLORIDE 600; 310; 30; 20 MG/100ML; MG/100ML; MG/100ML; MG/100ML
INJECTION, SOLUTION INTRAVENOUS CONTINUOUS
Status: DISCONTINUED | OUTPATIENT
Start: 2019-04-10 | End: 2019-04-10 | Stop reason: HOSPADM

## 2019-04-10 RX ORDER — FENTANYL CITRATE 50 UG/ML
25-50 INJECTION, SOLUTION INTRAMUSCULAR; INTRAVENOUS
Status: DISCONTINUED | OUTPATIENT
Start: 2019-04-10 | End: 2019-04-10 | Stop reason: HOSPADM

## 2019-04-10 RX ORDER — MEPERIDINE HYDROCHLORIDE 25 MG/ML
12.5 INJECTION INTRAMUSCULAR; INTRAVENOUS; SUBCUTANEOUS EVERY 5 MIN PRN
Status: DISCONTINUED | OUTPATIENT
Start: 2019-04-10 | End: 2019-04-10 | Stop reason: HOSPADM

## 2019-04-10 RX ORDER — HYDRALAZINE HYDROCHLORIDE 20 MG/ML
2.5-5 INJECTION INTRAMUSCULAR; INTRAVENOUS EVERY 10 MIN PRN
Status: DISCONTINUED | OUTPATIENT
Start: 2019-04-10 | End: 2019-04-10 | Stop reason: HOSPADM

## 2019-04-10 RX ORDER — HYDROMORPHONE HYDROCHLORIDE 1 MG/ML
.3-.5 INJECTION, SOLUTION INTRAMUSCULAR; INTRAVENOUS; SUBCUTANEOUS EVERY 5 MIN PRN
Status: DISCONTINUED | OUTPATIENT
Start: 2019-04-10 | End: 2019-04-10 | Stop reason: HOSPADM

## 2019-04-10 RX ORDER — IOPAMIDOL 612 MG/ML
INJECTION, SOLUTION INTRATHECAL PRN
Status: DISCONTINUED | OUTPATIENT
Start: 2019-04-10 | End: 2019-04-10 | Stop reason: HOSPADM

## 2019-04-10 RX ORDER — ONDANSETRON 4 MG/1
4 TABLET, ORALLY DISINTEGRATING ORAL EVERY 30 MIN PRN
Status: DISCONTINUED | OUTPATIENT
Start: 2019-04-10 | End: 2019-04-10 | Stop reason: HOSPADM

## 2019-04-10 RX ORDER — LIDOCAINE HYDROCHLORIDE 20 MG/ML
INJECTION, SOLUTION INFILTRATION; PERINEURAL PRN
Status: DISCONTINUED | OUTPATIENT
Start: 2019-04-10 | End: 2019-04-10

## 2019-04-10 RX ORDER — DEXAMETHASONE SODIUM PHOSPHATE 4 MG/ML
INJECTION, SOLUTION INTRA-ARTICULAR; INTRALESIONAL; INTRAMUSCULAR; INTRAVENOUS; SOFT TISSUE PRN
Status: DISCONTINUED | OUTPATIENT
Start: 2019-04-10 | End: 2019-04-10

## 2019-04-10 RX ORDER — CEFAZOLIN SODIUM 1 G/3ML
1 INJECTION, POWDER, FOR SOLUTION INTRAMUSCULAR; INTRAVENOUS SEE ADMIN INSTRUCTIONS
Status: DISCONTINUED | OUTPATIENT
Start: 2019-04-10 | End: 2019-04-10 | Stop reason: HOSPADM

## 2019-04-10 RX ORDER — CEFAZOLIN SODIUM 2 G/100ML
2 INJECTION, SOLUTION INTRAVENOUS
Status: COMPLETED | OUTPATIENT
Start: 2019-04-10 | End: 2019-04-10

## 2019-04-10 RX ORDER — NEOSTIGMINE METHYLSULFATE 1 MG/ML
VIAL (ML) INJECTION PRN
Status: DISCONTINUED | OUTPATIENT
Start: 2019-04-10 | End: 2019-04-10

## 2019-04-10 RX ORDER — MAGNESIUM HYDROXIDE 1200 MG/15ML
LIQUID ORAL PRN
Status: DISCONTINUED | OUTPATIENT
Start: 2019-04-10 | End: 2019-04-10 | Stop reason: HOSPADM

## 2019-04-10 RX ORDER — FENTANYL CITRATE 50 UG/ML
INJECTION, SOLUTION INTRAMUSCULAR; INTRAVENOUS PRN
Status: DISCONTINUED | OUTPATIENT
Start: 2019-04-10 | End: 2019-04-10

## 2019-04-10 RX ORDER — GLYCOPYRROLATE 0.2 MG/ML
INJECTION, SOLUTION INTRAMUSCULAR; INTRAVENOUS PRN
Status: DISCONTINUED | OUTPATIENT
Start: 2019-04-10 | End: 2019-04-10

## 2019-04-10 RX ORDER — NALOXONE HYDROCHLORIDE 0.4 MG/ML
.1-.4 INJECTION, SOLUTION INTRAMUSCULAR; INTRAVENOUS; SUBCUTANEOUS
Status: ACTIVE | OUTPATIENT
Start: 2019-04-10 | End: 2019-04-11

## 2019-04-10 RX ADMIN — NEOSTIGMINE METHYLSULFATE 4.5 MG: 1 INJECTION, SOLUTION INTRAVENOUS at 14:45

## 2019-04-10 RX ADMIN — ONDANSETRON 4 MG: 2 INJECTION INTRAMUSCULAR; INTRAVENOUS at 15:39

## 2019-04-10 RX ADMIN — GLYCOPYRROLATE 0.9 MG: 0.2 INJECTION, SOLUTION INTRAMUSCULAR; INTRAVENOUS at 14:45

## 2019-04-10 RX ADMIN — Medication 0.5 MG: at 03:27

## 2019-04-10 RX ADMIN — MIDAZOLAM 2 MG: 1 INJECTION INTRAMUSCULAR; INTRAVENOUS at 13:43

## 2019-04-10 RX ADMIN — FENTANYL CITRATE 100 MCG: 50 INJECTION, SOLUTION INTRAMUSCULAR; INTRAVENOUS at 13:51

## 2019-04-10 RX ADMIN — PROPOFOL 200 MG: 10 INJECTION, EMULSION INTRAVENOUS at 13:52

## 2019-04-10 RX ADMIN — DEXTROSE AND SODIUM CHLORIDE: 5; 900 INJECTION, SOLUTION INTRAVENOUS at 10:38

## 2019-04-10 RX ADMIN — DEXTROSE AND SODIUM CHLORIDE: 5; 900 INJECTION, SOLUTION INTRAVENOUS at 03:27

## 2019-04-10 RX ADMIN — CEFAZOLIN SODIUM 2 G: 2 INJECTION, SOLUTION INTRAVENOUS at 13:59

## 2019-04-10 RX ADMIN — Medication 0.5 MG: at 10:45

## 2019-04-10 RX ADMIN — SODIUM CHLORIDE, POTASSIUM CHLORIDE, SODIUM LACTATE AND CALCIUM CHLORIDE: 600; 310; 30; 20 INJECTION, SOLUTION INTRAVENOUS at 12:35

## 2019-04-10 RX ADMIN — SODIUM CHLORIDE, POTASSIUM CHLORIDE, SODIUM LACTATE AND CALCIUM CHLORIDE: 600; 310; 30; 20 INJECTION, SOLUTION INTRAVENOUS at 14:23

## 2019-04-10 RX ADMIN — DEXAMETHASONE SODIUM PHOSPHATE 4 MG: 4 INJECTION, SOLUTION INTRA-ARTICULAR; INTRALESIONAL; INTRAMUSCULAR; INTRAVENOUS; SOFT TISSUE at 14:00

## 2019-04-10 RX ADMIN — ACETAMINOPHEN 650 MG: 325 TABLET, FILM COATED ORAL at 22:16

## 2019-04-10 RX ADMIN — ROCURONIUM BROMIDE 50 MG: 10 INJECTION INTRAVENOUS at 13:52

## 2019-04-10 RX ADMIN — PHYTONADIONE 2 MG: 2 INJECTION, EMULSION INTRAMUSCULAR; INTRAVENOUS; SUBCUTANEOUS at 12:08

## 2019-04-10 RX ADMIN — LIDOCAINE HYDROCHLORIDE 100 MG: 20 INJECTION, SOLUTION INFILTRATION; PERINEURAL at 13:52

## 2019-04-10 RX ADMIN — Medication 0.5 MG: at 17:24

## 2019-04-10 RX ADMIN — ONDANSETRON 4 MG: 2 INJECTION INTRAMUSCULAR; INTRAVENOUS at 14:00

## 2019-04-10 RX ADMIN — DEXTROSE AND SODIUM CHLORIDE: 5; 900 INJECTION, SOLUTION INTRAVENOUS at 20:11

## 2019-04-10 RX ADMIN — DEXMEDETOMIDINE HYDROCHLORIDE 12 MCG: 100 INJECTION, SOLUTION INTRAVENOUS at 14:25

## 2019-04-10 ASSESSMENT — ACTIVITIES OF DAILY LIVING (ADL)
ADLS_ACUITY_SCORE: 11
ADLS_ACUITY_SCORE: 13

## 2019-04-10 NOTE — ANESTHESIA PREPROCEDURE EVALUATION
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 Pt has had prior anesthetic.     No history of anesthetic complications          ROS/MED HX    ENT/Pulmonary:     (+)sleep apnea, , . .    Neurologic:       Cardiovascular:         METS/Exercise Tolerance:     Hematologic:         Musculoskeletal:         GI/Hepatic:        (-) GERD   Renal/Genitourinary:     (+) Other Renal/ Genitourinary, Horseshoe kidney      Endo:         Psychiatric:         Infectious Disease:         Malignancy:         Other:                          Physical Exam  Normal systems: cardiovascular, pulmonary and dental    Airway   Mallampati: II  TM distance: >3 FB  Neck ROM: full    Dental     Cardiovascular   Rhythm and rate: regular and normal      Pulmonary    breath sounds clear to auscultation            Lab Results   Component Value Date    WBC 5.3 04/10/2019    HGB 12.5 (L) 04/10/2019    HCT 37.1 (L) 04/10/2019     (L) 04/10/2019    SED 10 2009     04/10/2019    POTASSIUM 4.4 04/10/2019    CHLORIDE 112 (H) 04/10/2019    CO2 28 04/10/2019    BUN 6 (L)  "04/10/2019    CR 0.90 04/10/2019     (H) 04/10/2019    CARLOS 8.2 (L) 04/10/2019    ALBUMIN 3.3 (L) 04/10/2019    PROTTOTAL 6.1 (L) 04/10/2019     (H) 04/10/2019    AST 86 (H) 04/10/2019    ALKPHOS 70 04/10/2019    BILITOTAL 0.6 04/10/2019    LIPASE 2,645 (H) 04/10/2019    PTT 27 01/31/2011    INR 1.65 (H) 04/10/2019       Preop Vitals  BP Readings from Last 3 Encounters:   04/10/19 139/82   08/03/18 100/70   05/10/17 122/70    Pulse Readings from Last 3 Encounters:   04/10/19 79   08/03/18 57   05/10/17 98      Resp Readings from Last 3 Encounters:   04/10/19 15   07/22/15 16   01/24/11 16    SpO2 Readings from Last 3 Encounters:   04/10/19 99%   05/10/17 95%   04/27/17 97%      Temp Readings from Last 1 Encounters:   04/10/19 37.6  C (99.7  F) (Temporal)    Ht Readings from Last 1 Encounters:   04/09/19 1.803 m (5' 11\")      Wt Readings from Last 1 Encounters:   04/09/19 87.9 kg (193 lb 12.8 oz)    Estimated body mass index is 27.03 kg/m  as calculated from the following:    Height as of this encounter: 1.803 m (5' 11\").    Weight as of this encounter: 87.9 kg (193 lb 12.8 oz).       Anesthesia Plan      History & Physical Review  History and physical reviewed and following examination; no interval change.    ASA Status:  2 .    NPO Status:  > 8 hours    Plan for General and ETT with Intravenous induction. Maintenance will be Balanced.    PONV prophylaxis:  Ondansetron (or other 5HT-3) and Dexamethasone or Solumedrol       Postoperative Care  Postoperative pain management:  IV analgesics and Oral pain medications.      Consents  Anesthetic plan, risks, benefits and alternatives discussed with:  Patient..                 Mame Sultana MD, MD  "

## 2019-04-10 NOTE — CONSULTS
General Surgery [facility] Consultation/H&P    Barry Herrera MRN#: [id number]   Age: 53 year old YOB: 1965     Date of Admission:          4/8/2019  Reason for consult/H&P: gallstones   Surgeon:      Ladarius Rizzo MD                  Chief Complaint:   Abdominal pain, epigastric         History of Present Illness:   This patient is a 53 year old  male who presented to the [facility] ER with abdominal pain and low substernal pain. Denies fever, chills, nausea, vomiting, change in BM or urination. No trauma, recent travel, jaundice. He had a mild similar episode last Thursday. He had an appendectomy in the past. He is on regular warfarin for unprovoked DVT in 2009.    History is obtained from the patient and chart.         Past Medical History:    has a past medical history of Allergic rhinitis, cause unspecified, Chronic Low Back Pain (1/06), Diverticulosis of colon (without mention of hemorrhage) (7/05), DVT (deep venous thrombosis) (H) (3/09), Other specified congenital anomaly of kidney (1/07), and Premature ejaculation.          Past Surgical History:     Past Surgical History:   Procedure Laterality Date     C APPENDECTOMY  7/77     HC DUPLEX EXTREMITY VENOUS, LIMITED UNILATERAL  3/2009    left occlusive thrombus groin to calf involving superficial femoral vein, popliteal veins as well as proximal peroneal and post tibial calf veins            Medications:     Prior to Admission medications    Medication Sig Start Date End Date Taking? Authorizing Provider   JANTOVEN 5 MG tablet TAKE 1 1/2 TABLETS BY MOUTH EVERY DAY OR AS DIRECTED 3/22/19  Yes Josette Sarmiento MD            Allergies:     Allergies   Allergen Reactions     No Known Drug Allergies             Social History:     Social History     Tobacco Use     Smoking status: Never Smoker     Smokeless tobacco: Never Used   Substance Use Topics     Alcohol use: No     Comment: approx 2 drinks a month             Family History:     "This patient has no significant relevant family history.  Family history is reviewed in detail.          Review of Systems:   Complete ROS is negative other than noted in the HPI.  C: NEGATIVE for fever, chills, change in weight  R: NEGATIVE for significant cough or SOB  CV: NEGATIVE for chest pain, palpitations or peripheral edema  GI:  NEGATIVE for dysuria, heartburn, or change in bowel habits  H: NEGATIVE for bleeding problems         Physical Exam:   Blood pressure 133/71, pulse 59, temperature 97.5  F (36.4  C), temperature source Oral, resp. rate 16, height 1.803 m (5' 11\"), weight 87.9 kg (193 lb 12.8 oz), SpO2 97 %.  I/O last 3 completed shifts:  In: 4252.5 [I.V.:4252.5]  Out: -     General - This is a well developed, well nourished male .  HEENT - Normocephalic. Atraumatic. Moist mucous membranes. Pupils equal.  No scleral icterus. Nose normal.  Neck - Supple without masses. No cervical adenopathy or thyromegaly  Lungs - Breathing not labored  Chest - Not tender. CVA's nontender  Heart - Regular rate & rhythm   Abdomen - Soft, somewhat tender mid abdomen. nondistended with +bowel sounds, no organomegaly.  Extremities - Moves all extremities. Warm without edema. Pulses noted  Neurologic - Nonfocal. Alert and oriented          Data:   Labs:  Recent Labs   Lab Test 04/10/19  0652 04/08/19  2130 07/22/15  1235   WBC 5.3 7.0 9.3   HGB 12.5* 13.5 13.3   HCT 37.1* 39.0* 39.2*   * 151 147*     Recent Labs   Lab Test 04/10/19  0652 04/08/19 2130 11/29/16 01/24/11  1508   POTASSIUM 4.4 4.2  --  3.7   CHLORIDE 112* 106  --  103   CO2 28 30  --  27   BUN 6* 20  --  17   CR 0.90 1.14 1.0 0.97     Recent Labs   Lab Test 04/10/19  0652 04/08/19  2130   BILITOTAL 0.6 0.7   * 163*   AST 86* 241*   ALKPHOS 70 77   LIPASE 2,645* 32,283*     Recent Labs   Lab Test 04/10/19  0652 04/09/19  0821 04/08/19  2130  01/31/11  0830   INR 1.65* 2.05* 2.03*   < > 0.99   PTT  --   --   --   --  27    < > = values in this " interval not displayed.     Recent Labs   Lab Test 04/10/19  0652 04/08/19  2130 01/24/11  1508   CARLOS 8.2* 8.8 9.2     Recent Labs   Lab Test 04/10/19  0652 04/08/19 2130 07/22/15  1235 02/25/13  1543 01/24/11  1508   ANIONGAP 2* 4  --   --  9   PROTEIN  --   --  Negative Negative  --    ALBUMIN 3.3* 4.1  --   --   --        CT scan of the abdomen: images reviewed: no obvious gallstones or wall thickening. Mild gabe-pancreatic inflammation. No necrosis, no free air.    Ultrasound of the abdomen: images reviewed. These look like small stones.  CBD OK               Assessment:   Gallstone pancreatitis. Minimal etOH intake. Improving. Bile duct not dilated         Plan:    I would lean towards cholecystectomy with cholangiogram rather than EUS/ERCP. Other option is wait for delayed procedure. He is off warfarin and would like to proceed now. Risks of operation reviewed.        I have discussed the history, physical, and plan with the patient.   Ladarius Rizzo M.D.

## 2019-04-10 NOTE — PROGRESS NOTES
[facility]    Medicine Progress Note - Hospitalist Service       Date of Admission:  4/8/2019  Assessment & Plan   Barry Herrera is a 53 year old male admitted on 4/8/2019 for evaluation of acute pancreatitis.    Acute Pancreatitis: Initially presented with complaints of chest pain. [place] 63281. CT on admission with evidence of acute pancreatitis. US with multiple of echogenic gallbladder wall foci cholesterol vs small polyps. No previous history of pancreatitis. Does not drink ETOH due to anticoagulation.  - Lipase trend 22598---7048  - Appreciate GI consult. Diet downgraded to NPO. General surgery consult placed. Further work-up may include surgery vs endoscope evaluation  - Continues to require IV dilaudid  - Keep NPO and await further input from General surgery and GI      H/o DVT: Chronically anticoagulated with warfarin  - Warfarin on hold in the vent a procedure is indicated      Diet: NPO for Medical/Clinical Reasons Except for: Meds    DVT Prophylaxis: Hold warfarin  Hamilton Catheter: not present  Code Status: Full Code      Disposition Plan   Expected discharge: Diet downgraded to NPO. May require further diagnostic procedure. Still requiring IV pain medications. Anticipate additional 24-48 hrs. Will admit inpatient for ongoing cares    Entered: Brynn Ayon PA-C 04/10/2019, 8:37 AM       The patient's care was discussed with the Bedside Nurse and Patient.    Brynn Ayon PA-C  Hospitalist Service  [facility]    ______________________________________________________________________    Interval History   Still having pain. Primarily with movement. No nausea.     Data reviewed today: I reviewed all medications, new labs and imaging results over the last 24 hours. I personally reviewed no images or EKG's today.    Physical Exam   Vital Signs: Temp: 97.5  F (36.4  C) Temp src: Oral BP: 133/71   Heart Rate: 65 Resp: 16 SpO2: 97 % O2 Device:  None (Room air)    Weight: 193 lbs 12.8 oz  Constitutional: Alert, resting comfortably in NAD  Respiratory: Normal effort, symmetric expansion, no crackles or wheezing  Cardiovascular: RRR no murmurs   GI: Non distended, normal bowels sounds, mild epigastric and LUQ tenderness  MSK: LE without edema. Dorsalis pedis pulse palpated bilaterally.   Skin/Integumen: Clear  Neuro: CN II-XII grossly intact  Psych:  Alert and oriented x 3. Normal affect        Data   Recent Labs   Lab 04/10/19  0652 04/09/19  0821 04/09/19  0450 04/08/19  2130   WBC 5.3  --   --  7.0   HGB 12.5*  --   --  13.5   MCV 91  --   --  89   *  --   --  151   INR 1.65* 2.05*  --  2.03*     --   --  140   POTASSIUM 4.4  --   --  4.2   CHLORIDE 112*  --   --  106   CO2 28  --   --  30   BUN 6*  --   --  20   CR 0.90  --   --  1.14   ANIONGAP 2*  --   --  4   CARLOS 8.2*  --   --  8.8   *  --   --  99   ALBUMIN 3.3*  --   --  4.1   PROTTOTAL 6.1*  --   --  7.4   BILITOTAL 0.6  --   --  0.7   ALKPHOS 70  --   --  77   *  --   --  163*   AST 86*  --   --  241*   LIPASE 2,645*  --   --  32,283*   TROPI  --   --  <0.015 <0.015

## 2019-04-10 NOTE — PROGRESS NOTES
Observation goals PRIOR TO DISCHARGE     Comments: -diagnostic tests and consults completed and resulted - NOT MET  -vital signs normal or at patient baseline - MET  Nurse to notify provider when observation goals have been met and patient is ready for discharge.

## 2019-04-10 NOTE — ANESTHESIA POSTPROCEDURE EVALUATION
Patient: Barry Herrera    Procedure(s):  LAPAROSCOPIC CHOLECYSTECTOMY WITH CHOLANGIOGRAMS    Diagnosis:unknown  Diagnosis Additional Information: No value filed.    Anesthesia Type:  No value filed.    Note:  Anesthesia Post Evaluation    Patient location during evaluation: PACU  Patient participation: Able to fully participate in evaluation  Level of consciousness: awake  Pain management: adequate  Airway patency: patent  Cardiovascular status: acceptable  Respiratory status: acceptable  Hydration status: acceptable  PONV: none     Anesthetic complications: None          Last vitals:  Vitals:    04/10/19 1241 04/10/19 1500 04/10/19 1510   BP: 139/82     Pulse: 79     Resp: 18 18 15   Temp: 37.8  C (100.1  F) 37.6  C (99.7  F)    SpO2: 96% 98% 99%         Electronically Signed By: Mame Sultana MD, MD  April 10, 2019  3:20 PM

## 2019-04-10 NOTE — PLAN OF CARE
PRIOR TO DISCHARGE     Comments: -diagnostic tests and consults completed and resulted Not met  -vital signs normal or at patient baseline met     VSS. NPO. Declines pain medication. Holding coumadin. GI seen. Surgery consult tomorrow

## 2019-04-10 NOTE — ANESTHESIA CARE TRANSFER NOTE
Patient: Barry Herrera    Procedure(s):  LAPAROSCOPIC CHOLECYSTECTOMY WITH CHOLANGIOGRAMS    Diagnosis: unknown  Diagnosis Additional Information: No value filed.    Anesthesia Type:   No value filed.     Note:  Airway :Face Mask  Patient transferred to:PACU  Comments: Neuromuscular blockade reversed after TOF 4/4, spontaneous respirations, adequate tidal volumes, followed commands to voice, oropharynx suctioned with soft flexible catheter, extubated atraumatically, extubated with suction, airway patent after extubation.  Oxygen via facemask at 10 liters per minute to PACU. Oxygen tubing connected to wall O2 in PACU, SpO2, NiBP, and EKG monitors and alarms on and functioning, Arabella Hugger warmer connected to patient gown, report on patient's clinical status given to PACU RN, RN questions answered. Handoff Report: Identifed the Patient, Identified the Reponsible Provider, Reviewed the pertinent medical history, Discussed the surgical course, Reviewed Intra-OP anesthesia mangement and issues during anesthesia, Set expectations for post-procedure period and Allowed opportunity for questions and acknowledgement of understanding      Vitals: (Last set prior to Anesthesia Care Transfer)    CRNA VITALS  4/10/2019 1428 - 4/10/2019 1510      4/10/2019             NIBP:  154/81    Pulse:  86    NIBP Mean:  102    SpO2:  100 %    Resp Rate (observed):  10                Electronically Signed By: RAFAEL Alejo CRNA  April 10, 2019  3:10 PM

## 2019-04-10 NOTE — OP NOTE
General Surgery Operative Note    PREOPERATIVE DIAGNOsis: gallstone pancreatitis    POSTOPERATIVE DIAGNOSIS:  Same    PROCEDURE:   Procedure(s):  LAPAROSCOPIC CHOLECYSTECTOMY WITH CHOLANGIOGRAMS  Interpretation of fluoroscopic cholangiograms    ANESTHESIA:  General.      SURGEON:  Ladarius Rizzo MD    ASSISTANT:  David Gallagher PA-C. The physician assistant was medically necessary for their expertise in camera management, suctioning, and retraction    INDICATIONS:  The patient has gallstone pancreatitis. The options were reviewed with him. He chose cholecystectomy today. The risks, including but not limited to bleeding, infection, bile duct or bowel injury, anesthesia, and the possible need for an open approach were reviewed. The patient appeared to understand and wished to proceed with operation.    PROCEDURE:  The patient was taken to the operating suite.  The operative area was prepped and draped in a sterile fashion.  Surgeon initiated timeout was acknowledged.      Under general anesthesia the abdomen was insufflated through a periumbilical incision with a veress needle. Over 3 liters were place with low pressures. A 5mm trocar was placed. I placed a 5mm trocar in the left upper quadrant.There was no injury seen when the camera was placed. Under direct vision a 5mm trocar was placed in the right upper quadrant and an 11mm trocar placed below the xiphoid. The gallbladder was grasped and adhesions taken down with blunt dissection and cautery. The peritoneal surfaces of the critical angle were cauterized posteriorly and anteriorly. The critical angle was dissected out, until there were only two structures remaining. I clipped the artery once, the duct on the top side, and introduced a Taut catheter. Multiple fluoroscopic views showed good flow into the duodenum, no definite common duct stones, and normal cystic duct anatomy. Once these structures were identified, the duct was doubly clipped proximally, singly  clipped distally and divided. The cystic artery was double clipped proximally, singly clipped distally and divided. The gallbladder was dissected off its bed with cautery from medial to lateral. The gallbladder was set aside and hemostasis assured on the liver. Irrigation was used and suctioned out. The bed was dry and the clips were intact. The gallbladder was removed through the larger incision, which was enlarged as needed to permit passage of the gallbladder. We then irrigated again, and saw no sign of blood of bile leak. We checked for veress needle injury, there was none. The large trocar was removed and the fascia closed with 0 Vicryl. Marcaine was instilled. Gas was suctioned out. Trocars were removed. Sponge count was reported as correct. All incisions were closed with 4-0 Vicryl and steri-strips.            INTRAOPERATIVE FINDINGS:  No definite common duct stones.    Ladarius Rizzo

## 2019-04-10 NOTE — PLAN OF CARE
A&Ox4. VSS on RA. Independent in room. NPO. R Abd pain 4-5/10, given prn IV dilaudid x 2, relief after administration. GI consult completed, suspecting gallstone pancreatitis, possible surgery, upper endoscopy, endoscopic US, see Dr. Real's note. IVF infusing. Surgery consult scheduled. Denies nausea, dizziness, lightheadedness. Continue to monitor.

## 2019-04-10 NOTE — PLAN OF CARE
Observation goals PRIOR TO DISCHARGE     Comments: -diagnostic tests and consults completed and resulted - NOT MET, Surgery Consult today. Pt to go inpatient.   -vital signs normal or at patient baseline - MET  Nurse to notify provider when observation goals have been met and patient is ready for discharge.

## 2019-04-11 ENCOUNTER — TELEPHONE (OUTPATIENT)
Dept: FAMILY MEDICINE | Facility: CLINIC | Age: 54
End: 2019-04-11

## 2019-04-11 VITALS
BODY MASS INDEX: 27.13 KG/M2 | HEIGHT: 71 IN | OXYGEN SATURATION: 98 % | DIASTOLIC BLOOD PRESSURE: 75 MMHG | SYSTOLIC BLOOD PRESSURE: 134 MMHG | RESPIRATION RATE: 16 BRPM | WEIGHT: 193.8 LBS | TEMPERATURE: 97.3 F | HEART RATE: 50 BPM

## 2019-04-11 LAB
ALBUMIN SERPL-MCNC: 3.3 G/DL (ref 3.4–5)
ALP SERPL-CCNC: 69 U/L (ref 40–150)
ALT SERPL W P-5'-P-CCNC: 156 U/L (ref 0–70)
ANION GAP SERPL CALCULATED.3IONS-SCNC: 5 MMOL/L (ref 3–14)
AST SERPL W P-5'-P-CCNC: 53 U/L (ref 0–45)
BILIRUB DIRECT SERPL-MCNC: 0.2 MG/DL (ref 0–0.2)
BILIRUB SERPL-MCNC: 0.9 MG/DL (ref 0.2–1.3)
BUN SERPL-MCNC: 5 MG/DL (ref 7–30)
CALCIUM SERPL-MCNC: 8.8 MG/DL (ref 8.5–10.1)
CHLORIDE SERPL-SCNC: 112 MMOL/L (ref 94–109)
CO2 SERPL-SCNC: 26 MMOL/L (ref 20–32)
COPATH REPORT: NORMAL
CREAT SERPL-MCNC: 0.84 MG/DL (ref 0.66–1.25)
ERYTHROCYTE [DISTWIDTH] IN BLOOD BY AUTOMATED COUNT: 13.7 % (ref 10–15)
GFR SERPL CREATININE-BSD FRML MDRD: >90 ML/MIN/{1.73_M2}
GLUCOSE SERPL-MCNC: 105 MG/DL (ref 70–99)
HCT VFR BLD AUTO: 37.5 % (ref 40–53)
HGB BLD-MCNC: 12.7 G/DL (ref 13.3–17.7)
INR PPP: 1.18 (ref 0.86–1.14)
LIPASE SERPL-CCNC: 241 U/L (ref 73–393)
MCH RBC QN AUTO: 30.5 PG (ref 26.5–33)
MCHC RBC AUTO-ENTMCNC: 33.9 G/DL (ref 31.5–36.5)
MCV RBC AUTO: 90 FL (ref 78–100)
PLATELET # BLD AUTO: 146 10E9/L (ref 150–450)
POTASSIUM SERPL-SCNC: 4 MMOL/L (ref 3.4–5.3)
PROT SERPL-MCNC: 6.7 G/DL (ref 6.8–8.8)
RBC # BLD AUTO: 4.16 10E12/L (ref 4.4–5.9)
SODIUM SERPL-SCNC: 143 MMOL/L (ref 133–144)
WBC # BLD AUTO: 8.4 10E9/L (ref 4–11)

## 2019-04-11 PROCEDURE — 85610 PROTHROMBIN TIME: CPT | Performed by: PHYSICIAN ASSISTANT

## 2019-04-11 PROCEDURE — 99238 HOSP IP/OBS DSCHRG MGMT 30/<: CPT | Performed by: INTERNAL MEDICINE

## 2019-04-11 PROCEDURE — 36415 COLL VENOUS BLD VENIPUNCTURE: CPT | Performed by: PHYSICIAN ASSISTANT

## 2019-04-11 PROCEDURE — 83690 ASSAY OF LIPASE: CPT | Performed by: PHYSICIAN ASSISTANT

## 2019-04-11 PROCEDURE — 85027 COMPLETE CBC AUTOMATED: CPT | Performed by: PHYSICIAN ASSISTANT

## 2019-04-11 PROCEDURE — 80053 COMPREHEN METABOLIC PANEL: CPT | Performed by: PHYSICIAN ASSISTANT

## 2019-04-11 PROCEDURE — 82248 BILIRUBIN DIRECT: CPT | Performed by: PHYSICIAN ASSISTANT

## 2019-04-11 PROCEDURE — 25800030 ZZH RX IP 258 OP 636: Performed by: PHYSICIAN ASSISTANT

## 2019-04-11 RX ORDER — HYDROCODONE BITARTRATE AND ACETAMINOPHEN 5; 325 MG/1; MG/1
1-2 TABLET ORAL EVERY 4 HOURS PRN
Qty: 5 TABLET | Refills: 0 | Status: SHIPPED | OUTPATIENT
Start: 2019-04-11 | End: 2019-10-01

## 2019-04-11 RX ORDER — ACETAMINOPHEN 325 MG/1
650 TABLET ORAL EVERY 4 HOURS PRN
COMMUNITY
Start: 2019-04-11 | End: 2019-10-01

## 2019-04-11 RX ADMIN — DEXTROSE AND SODIUM CHLORIDE: 5; 900 INJECTION, SOLUTION INTRAVENOUS at 03:04

## 2019-04-11 RX ADMIN — DEXTROSE AND SODIUM CHLORIDE: 5; 900 INJECTION, SOLUTION INTRAVENOUS at 09:48

## 2019-04-11 ASSESSMENT — ACTIVITIES OF DAILY LIVING (ADL)
ADLS_ACUITY_SCORE: 11

## 2019-04-11 NOTE — BRIEF OP NOTE
[facility]    Brief Operative Note    Pre-operative diagnosis: unknown  Post-operative diagnosis Gallstone Pancreatitis  Procedure: Procedure(s):  LAPAROSCOPIC CHOLECYSTECTOMY WITH CHOLANGIOGRAMS  Surgeon: Surgeon(s) and Role:     * Ladarius Rizzo MD - Primary     * David Gallagher PA-C - Assisting  Anesthesia: General   Estimated blood loss: Less than 10 ml  Drains: None  Specimens:   ID Type Source Tests Collected by Time Destination   A : gallbladder and contents Tissue Gallbladder and Contents SURGICAL PATHOLOGY EXAM Ladarius Rizzo MD 4/10/2019  1:55 PM      Findings:   None.  Complications: None.  Implants: None.

## 2019-04-11 NOTE — PLAN OF CARE
POD 1 lap joan. A/Ox4. VSS on RA. Mild abdominal discomfort managed with PRN tylenol. Port sites WDL. Clear liquid diet, tolerating well. Voiding adequately. PIV infusing D5 NS 150ml/hr. Independent. Continue to monitor.

## 2019-04-11 NOTE — DISCHARGE INSTRUCTIONS
[facility] - [facility]  Discharge Instructions: Post-Operative Laparoscopic Cholecystectomy    ACTIVITY    Expect to feel tired after your surgery.  This will gradually resolve.      Take frequent, short walks and increase your activity gradually.      Avoid strenuous physical activity or heavy lifting greater than 15-20 lbs. for 2-3 weeks.  You may climb stairs.    You may drive without restrictions when you are not using any prescription pain medication and feel comfortable in a car.    You may return to work/school when you are comfortable without any prescription pain medication.    WOUND CARE    You may remove your outer dressing or Band-Aids and shower 48 hours after the surgery.  Pat your incisions dry and leave them open to air.  Re-apply dressing (Band-Aids or gauze/tape) as needed for comfort or drainage.    You may have steri-strips (looks like white tape) on your incision.  You may peel off the steri-strips 2 weeks after your surgery if they have not peeled off on their own.     Do not soak your incisions in a tub or pool for 2 weeks.     Do not apply any lotions, creams, or ointments to your incisions.    A ridge under your incisions is normal and will gradually resolve.    DIET    Start with liquids, then gradually resume your regular diet as tolerated.  Avoid heavy, spicy, and greasy meals for 2-3 days.    Drink plenty of fluids to stay hydrated.    It is not uncommon to experience some loose stools or diarrhea after surgery.  This is your body s way of adapting to the bile which will slowly drain into your intestine.  A low fat diet may help with this.  This should improve over 1-2 months.    PAIN    Expect some tenderness and discomfort at the incision sites.  Use the prescribed pain medication at your discretion.  Expect gradual resolution of your pain over several days.    You may take ibuprofen with food (unless you have been told not to) instead of or in addition to  your prescribed pain medication.  If you are taking Norco or Percocet, do not take any additional acetaminophen/APAP/Tylenol.    Do not drink alcohol or drive while you are taking pain medications.    You may apply ice to your incisions in 20 minute intervals as needed for the next 48 hours.  After that time, consider switching to heat if you prefer.    EXPECTATIONS    Pain medications can cause constipation.  Limit use when possible.  Take over the counter stool softener/stimulant, such as Colace or Senna, 1-2 times a day with plenty of water.  You may take a mild over the counter laxative, such as Miralax or a suppository, as needed.  You may discontinue these medications once you are having regular bowel movements and/or are no longer taking your narcotic pain medication.      You may have shoulder or upper back discomfort due to the gas used in surgery.  This is temporary and should resolve in 48-72 hours.  Short, frequent walks may help with this.    FOLLOW UP    Our office will contact you approximately 2 weeks to check on your progress and answer any questions you may have.  If you are doing well, you will not need to return for a follow up appointment.  If any concerns are identified over the phone, we will help you make an appointment to see a provider.     If you have not received a phone call, have any questions or concerns, or would like to be seen, please call us at [phone] and ask to speak with our nurse.  We are located at [address] [place] [address].    CALL OUR OFFICE -[phone] IF YOU HAVE:     Chills or fever above 101 F.    Increased redness, warmth, or drainage at your incisions.    Significant bleeding.    Pain not relieved by your pain medication or rest.    Increasing pain after the first 48 hours.    Any other concerns or questions.    Revised January 2018

## 2019-04-11 NOTE — PLAN OF CARE
POD1 lap joan. Pt is A and O X4. VSS on Ra. Denies abdominal pain and nausea. Durgical sites WDL. Diet advanced to full and low fat diet, tolerating well.  Good urine output this shift. Fluids discontinued per Md order.  Independent in room.  Passing gas, no BM yet. Ambulation encouraged. MD enquired about discharge. Pain med hard script available for discharge in pt's chart. Pt's incoming nurse and hospitalist notified.  Report given to incoming nurse. Pt in room in stable conditions, awaiting discharge.

## 2019-04-11 NOTE — PROVIDER NOTIFICATION
MD Notification    Notified Person: MD    Notified Person Name:  Derrickshaynaalin     Notification Date/Time: 4/11/19 at  1445    Notification Interaction: pager    Purpose of Notification:FYI, pt tolerating well advanced diet ( full liquids and low fat diet). Pain is well controlled today. Pt wondering if okay to discharge.    Orders Received:No call back received    Comments:

## 2019-04-11 NOTE — PROGRESS NOTES
Patient discharged to home at 1730.  Denied pain at time of discharge.  PIV had been removed prior to evening shift.  Went over discharge AVS with patient and family member.  Patient aware of follow up appointments/calls and how to care for himself after his procedure (instructions provided in AVS).  Patient also aware of when to contact medical help/signs of infection.  Narcotic prescription given to patient at time of discharge, he was unsure if he is going to fill it or not.

## 2019-04-11 NOTE — PROGRESS NOTES
"General Surgery Note    Stable S/P Lap Anita for Gallstone Pancreatitis  POD1    -Tolerating diet.  -No pain medication needed overnight.  Would be ok with paper Rx to go home with however.  -Discussed diet and recovery expectations  -Follow up with phone call in ~2 weeks.  Call with concerns prior.  -Discharge to home after approved by Hospitalist.    Feels really well.  Pain tolerable.  No N/V.    NAD, very pleasant, A&O  Fully dressed and up in room  /67   Pulse 68   Temp 96.3  F (35.7  C) (Oral)   Resp 16   Ht 1.803 m (5' 11\")   Wt 87.9 kg (193 lb 12.8 oz)   SpO2 99%   BMI 27.03 kg/m      Intake/Output Summary (Last 24 hours) at 4/11/2019 1207  Last data filed at 4/11/2019 1100  Gross per 24 hour   Intake 3802 ml   Output 2800 ml   Net 1002 ml     Lipase: 241  LFTs: all improving  WBC: 8.4  Hgb: 12.7  "

## 2019-04-11 NOTE — TELEPHONE ENCOUNTER
Patient calling to cancel tomorrow's INR appointment. He is currently inpatient at [facility] for a cholecystectomy. He is off warfarin at this point. He is not sure when he is being discharged or when he is restarting warfarin. I asked him to update when he knows more information so we can decide how to proceed. Patient/ parent verbalized understanding and agrees with plan.    Ronda Shell RN   [facility] - Triage

## 2019-04-11 NOTE — DISCHARGE SUMMARY
[facility]  Hospitalist Discharge Summary       Date of Admission:  4/8/2019  Date of Discharge:  4/11/2019  Discharging Provider: Willard Vega MD      Discharge Diagnoses   Gallstone pancreatitis  Acute post-operative pain    Follow-ups Needed After Discharge   Follow-up Appointments     Follow-up and recommended labs and tests      Our office will contact you within 2 weeks to check on your progress and   answer any questions you may have.  If you are doing well, you will not   need to return for a follow up appointment.  If any concerns are   identified over the phone, we may ask you to make an appointment to see a   provider in our clinic.   If you have not received a phone call, have any questions or concerns, or   would like to be seen, please call us at [phone] and ask to speak   with our nurse.  We are located at [address] [place] [address].         Follow-up and recommended labs and tests       Follow up with your coumadin clinic to check your INR on Saturday if they   are open or you can go Monday if they are closed so they can adjust   coumadin for goal INR 2-3.             Unresulted Labs Ordered in the Past 30 Days of this Admission     Date and Time Order Name Status Description    4/10/2019 1443 Surgical pathology exam In process       These results will be followed up by surgery and communicated with you if any unexpected findings are found.    Discharge Disposition   Discharged to home  Condition at discharge: Stable    Hospital Course   Barry Herrera is a 53 year old male admitted on 4/8/2019 for evaluation of acute pancreatitis.     Acute Gallstone Pancreatitis: Initially presented with complaints of chest pain. [place] [address]. CT on admission with evidence of acute pancreatitis. US with multiple of echogenic gallbladder wall foci cholesterol vs small polyps. No previous history of pancreatitis. Does not drink ETOH due to  anticoagulation.  - Lipase trended back to normal prior to discharge  - Appreciate GI consult and surgery consults. 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 Did not have bridging with past procedures without incidents.  - Resume warfarin at home dosing  - Follow up with coumadin clinic on Saturday (if not open can go Monday) for ongoing adjustments of coumadin with goal INR 2-3    Consultations This Hospital Stay   PHARMACY TO DOSE WARFARIN  GASTROENTEROLOGY IP CONSULT  SURGERY GENERAL IP CONSULT    Code Status   Full Code    Time Spent on this Encounter   I, Willard Vega, personally saw the patient today and spent less than or equal to 30 minutes discharging this patient.     Willard Vega MD  [facility]  ______________________________________________________________________    Physical Exam   Vital Signs: Temp: 96.9  F (36.1  C) Temp src: Oral BP: 123/64 Pulse: 68 Heart Rate: 62 Resp: 16 SpO2: 98 % O2 Device: None (Room air)    Weight: 193 lbs 12.8 oz  Constitutional: Awake, alert, cooperative, no apparent distress  Respiratory: Clear to auscultation bilaterally, no crackles or wheezing  Cardiovascular: Regular rate and rhythm, normal S1 and S2, and no murmur noted  GI: Normal bowel sounds, soft, non-distended, non-tender  Skin/Integumen: No rashes, no cyanosis, no edema  Other:         Primary Care Physician   Josette Sarmiento    Discharge Orders      Reason for your hospital stay    Gallstone pancreatitis requiring cholecystectomy     Activity    Your activity upon discharge: activity as tolerated, ambulate in house, no driving while on analgesics and no heavy lifting for 2 weeks     Wound care and dressings    Instructions to care for your wound at home: may get  incision wet in shower but do not soak or scrub.     Follow-up and recommended labs and tests    Our office will contact you within 2 weeks to check on your progress and answer any questions you may have.  If you are doing well, you will not need to return for a follow up appointment.  If any concerns are identified over the phone, we may ask you to make an appointment to see a provider in our clinic.   If you have not received a phone call, have any questions or concerns, or would like to be seen, please call us at [phone] and ask to speak with our nurse.  We are located at [address] [place] [address].     Follow-up and recommended labs and tests     Follow up with your coumadin clinic to check your INR on Saturday if they are open or you can go Monday if they are closed so they can adjust coumadin for goal INR 2-3.     Diet    Follow this diet upon discharge: Low fat diet for 1 week then can resume regular diet       Significant Results and Procedures   Most Recent 3 CBC's:  Recent Labs   Lab Test 04/11/19  0802 04/10/19  0652 04/08/19  2130   WBC 8.4 5.3 7.0   HGB 12.7* 12.5* 13.5   MCV 90 91 89   * 112* 151     Most Recent 3 BMP's:  Recent Labs   Lab Test 04/11/19  0802 04/10/19  0652 04/08/19  2130    142 140   POTASSIUM 4.0 4.4 4.2   CHLORIDE 112* 112* 106   CO2 26 28 30   BUN 5* 6* 20   CR 0.84 0.90 1.14   ANIONGAP 5 2* 4   CARLOS 8.8 8.2* 8.8   * 108* 99     Most Recent 2 LFT's:  Recent Labs   Lab Test 04/11/19  0802 04/10/19  0652   AST 53* 86*   * 210*   ALKPHOS 69 70   BILITOTAL 0.9 0.6     Most Recent INR's and Anticoagulation Dosing History:  Anticoagulation Dose History     Recent Dosing and Labs Latest Ref Rng & Units 12/7/2018 1/18/2019 2/28/2019 4/8/2019 4/9/2019 4/10/2019 4/11/2019    INR 0.86 - 1.14 - - - 2.03(H) 2.05(H) 1.65(H) 1.18(H)    INR 0.86 - 1.14 2.9(A) 2.1(A) 2.0(A) - - - -      ,   Results for orders placed or performed during the  hospital encounter of 04/08/19   XR Chest 2 Views    Narrative    XR CHEST 2 VW 4/8/2019 10:03 PM     HISTORY: cp      Impression    IMPRESSION: Negative exam.    MARYANN HERNÁNDEZ MD   CT Abdomen Pelvis w Contrast    Narrative    CT ABDOMEN AND PELVIS WITH CONTRAST  4/9/2019 1:09 AM     HISTORY: Unspecified abdominal pain.    COMPARISON: 7/11/2005.    TECHNIQUE: Following the uneventful administration of 98 mL Isovue-370  intravenous contrast, helical sections were acquired from the top of  the diaphragm through the pubic symphysis. Coronal reconstructions  were generated. Radiation dose for this scan was reduced using  automated exposure control, adjustment of the mA and/or kV according  to the patient's size, or iterative reconstruction technique.    FINDINGS:  Abdomen: The liver and spleen are unremarkable. Mild to moderate  peripancreatic haziness. The pancreas enhances normally with contrast  material. No circumscribed peripancreatic fluid collection. The  adrenal glands are unremarkable. Horseshoe kidney. 4.4 cm cyst in the  right side of the horseshoe kidney. A subcentimeter low attenuation  lesion in the left side of the kidney, too small to characterize. 0.2  cm nonobstructing calculus in the left side of the kidney. The  gallbladder is present. The stomach is moderately distended with  fluid. No enlarged lymph nodes in the upper abdomen.    Scan through the lower chest is unremarkable.    Pelvis: The small and large bowel are normal in caliber. A few colonic  diverticula are present without evidence of diverticulitis. The  appendix is not visualized. No bowel wall thickening, pneumatosis or  free intraperitoneal gas. No enlarged lymph nodes or free fluid in the  pelvis.      Impression    IMPRESSION: Acute pancreatitis: There is mild to moderate  peripancreatic stranding. No evidence of pancreatic necrosis or  pseudocyst.    SMITH SOTELO MD   US Abdomen Limited    Narrative    RIGHT UPPER QUADRANT  ULTRASOUND    PROCEDURE DATE:  4/9/2019 8:18 AM     CLINICAL HISTORY/INDICATION:   Right upper quadrant pain. Recent CT showing pancreatitis.    COMPARISON:   CT 4/9/2019    TECHNIQUE:   Grayscale and color doppler ultrasound of the right upper quadrant.    FINDINGS:  Gallbladder: Nondistended without wall thickening or pericholecystic  abnormality. Multiple echogenic foci measuring 3 mm or less scattered  within the gallbladder wall.    Bile ducts:  No intra or extrahepatic biliary ductal dilatation.  Common bile duct within normal limits.    Liver:  The left hepatic lobe is not seen due to overlying bowel gas.  The visualized portions of the right lobe are unremarkable.    Pancreas:  Completely obscured by gas.    Right kidney:  Measures 12.0 cm. Cortical thickness is 1.7 cm. Cortical echogenicity  is normal without evidence for shadowing stone or mass. 4.4 cm mid  upper pole cyst.. No hydronephrosis.       Impression    IMPRESSION:   1.  Multiple echogenic gallbladder wall foci could be small  cholesterol deposits versus small polyps.  2.  Significant overlying bowel gas completely obscures the pancreas  and left hepatic lobe.    Gallbladder polyp follow-up:    Risk factors for gallbladder malignancy  *  Polyp less <6 mm  *  Follow-up ultrasound in 6 months then yearly for 5 years  *  Increase in size > or = to 2 mm, consider cholecystectomy.  *  Polyp >6 mm  *  Consider cholecystectomy    No risk factors for gallbladder malignancy:  *  Polyp less <6 mm  *  Follow-up ultrasound in one, 3 and 5 years  *  Polyp >6 mm  *  Follow-up ultrasound in 6 months, then yearly for 5 years  *  Increase in size > or = to 2 mm, consider cholecystectomy.    SYLVIA CASTELLANO MD   XR Surgery CRISTINA Fluoro L/T 5 Min w Stills    Narrative    SURGERY C-ARM FLUORO LESS THAN 5 MIN W STILLS  4/10/2019 2:40 PM     HISTORY: Cholangiogram. Fluoro time 0.1, 5 images, C-arm 1.    COMPARISON: None.      Impression    IMPRESSION: Five spot  fluoroscopic images obtained during  cholangiogram with cholecystectomy. Intrahepatic biliary system is  decompressed. Common bile duct opacifies with contrast. No obvious  filling defect. Contrast noted extending to the duodenum. Total  fluoroscopic time was 0.1 minute.    TERRY BARBOSA MD       Discharge Medications   Current Discharge Medication List      START taking these medications    Details   acetaminophen (TYLENOL) 325 MG tablet Take 2 tablets (650 mg) by mouth every 4 hours as needed for mild pain (Do not exceed 4000 mg daily including acetaminophen in combination narcotics)    Associated Diagnoses: Acute biliary pancreatitis without infection or necrosis      HYDROcodone-acetaminophen (NORCO) 5-325 MG tablet Take 1-2 tablets by mouth every 4 hours as needed for moderate to severe pain  Qty: 5 tablet, Refills: 0    Associated Diagnoses: Gallstone pancreatitis; Acute post-operative pain         CONTINUE these medications which have NOT CHANGED    Details   JANTOVEN 5 MG tablet TAKE 1 1/2 TABLETS BY MOUTH EVERY DAY OR AS DIRECTED  Qty: 135 tablet, Refills: 0    Associated Diagnoses: Long term current use of anticoagulant therapy; Deep vein thrombosis (DVT) of proximal vein of left lower extremity, unspecified chronicity (H)           Allergies   Allergies   Allergen Reactions     No Known Drug Allergies

## 2019-04-11 NOTE — PROGRESS NOTES
Patient up from PACU at 1500. VSS, capno WDL. Dilaudid x1 for pain, improved. Up SBA. Tolerating clear liquids. Voiding adequately. Abdominal port sites WDL. Will continue to monitor.

## 2019-04-12 ENCOUNTER — TELEPHONE (OUTPATIENT)
Dept: FAMILY MEDICINE | Facility: CLINIC | Age: 54
End: 2019-04-12

## 2019-04-12 ENCOUNTER — ANTICOAGULATION THERAPY VISIT (OUTPATIENT)
Dept: FAMILY MEDICINE | Facility: CLINIC | Age: 54
End: 2019-04-12

## 2019-04-12 PROCEDURE — 99207 ZZC NO CHARGE NURSE ONLY: CPT | Performed by: FAMILY MEDICINE

## 2019-04-12 NOTE — TELEPHONE ENCOUNTER
Patient restarted warfarin last evening, 7.5 mg dose. See 4/12/19 anticoagulation encounter for warfarin dosing and follow up. Carline Reynoso RN

## 2019-04-12 NOTE — TELEPHONE ENCOUNTER
Reason for Call:  Other call back    Detailed comments: Please call PT back about INR    Phone Number Patient can be reached at: Cell number on file:    Telephone Information:   Mobile [phone]       Best Time: any    Can we leave a detailed message on this number? YES    Call taken on 4/12/2019 at 11:35 AM by Corie Ortiz

## 2019-04-12 NOTE — TELEPHONE ENCOUNTER
Pt calling back states Carline wanted to change his inr appt from Wednesday 4/17 to Tuesday 4/16.    appt scheduled for 12:45 on Tuesday 4/16 in INR and pt agrees with appt.     Fouzia CORDOVA RN  EP Triage

## 2019-04-12 NOTE — PROGRESS NOTES
ANTICOAGULATION FOLLOW-UP CLINIC VISIT    Patient Name:  Barry Herrera  Date:  4/12/2019  Contact Type:  Telephone/ spoke with patient for assessment    SUBJECTIVE:     Patient Findings     Positives:   Change in medications (hydrocodone for post op pain)    Comments:   Patient had cholecystectomy on 4/11. Was given 2 mg of vitamin K prior to surgery.            OBJECTIVE    INR   Date Value Ref Range Status   04/11/2019 1.18 (H) 0.86 - 1.14 Final       ASSESSMENT / PLAN  INR assessment SUB    Recheck INR In: 5 DAYS    INR Location Clinic      Anticoagulation Summary  As of 4/12/2019    INR goal:   2.0-3.0   TTR:   79.0 % (3.1 y)   INR used for dosing:   No new INR was available at the time of this encounter.   Warfarin maintenance plan:   7.5 mg (5 mg x 1.5) every day   Full warfarin instructions:   7.5 mg every day   Weekly warfarin total:   52.5 mg   Plan last modified:   Lois Claudio RN (6/16/2017)   Next INR check:   4/17/2019   Target end date:       Indications    Long-term (current) use of anticoagulants [Z79.01] [Z79.01]  DVT (deep venous thrombosis) (H) (Resolved) [I82.409]             Anticoagulation Episode Summary     INR check location:   Anticoagulation Clinic    Preferred lab:       Send INR reminders to:   [facility]    Comments:         Anticoagulation Care Providers     Provider Role Specialty Phone number    Josette Sarmiento MD  Family Practice [phone]            See the Encounter Report to view Anticoagulation Flowsheet and Dosing Calendar (Go to Encounters tab in chart review, and find the Anticoagulation Therapy Visit)    INR sub therapeutic at yesterday due to surgery. Patient restarted warfarin 7.5 mg daily last evening when he got home from the hospital. Reviewed signs of bleeding and clotting. Reminded the patient to move frequently and drink plenty of water.  Recheck in 5 days or sooner if problems/concerns.       Carline Reynoso, COTY

## 2019-04-16 ENCOUNTER — ANTICOAGULATION THERAPY VISIT (OUTPATIENT)
Dept: NURSING | Facility: CLINIC | Age: 54
End: 2019-04-16
Payer: COMMERCIAL

## 2019-04-16 LAB — INR POINT OF CARE: 1.7 (ref 0.86–1.14)

## 2019-04-16 PROCEDURE — 99207 ZZC NO CHARGE NURSE ONLY: CPT

## 2019-04-16 PROCEDURE — 36416 COLLJ CAPILLARY BLOOD SPEC: CPT

## 2019-04-16 PROCEDURE — 85610 PROTHROMBIN TIME: CPT | Mod: QW

## 2019-04-16 NOTE — PROGRESS NOTES
ANTICOAGULATION FOLLOW-UP CLINIC VISIT    Patient Name:  Barry Herrera  Date:  2019  Contact Type:  Face to Face    SUBJECTIVE:     Patient Findings     Positives:   Change in activity (Decreased due to cholecystectomy on 4/10. Ten pound lifting restriction for 2 weeks. Advance activity as tolerated.)    Comments:   Denies bleeding at surgical site. Patient states that he has not needed to take anything for post op pain. Last tylenol was last week.           OBJECTIVE    INR Protime   Date Value Ref Range Status   2019 1.7 (A) 0.86 - 1.14 Final       ASSESSMENT / PLAN  INR assessment SUB    Recheck INR In: 1 WEEK    INR Location Clinic      Anticoagulation Summary  As of 2019    INR goal:   2.0-3.0   TTR:   79.0 % (3.1 y)   INR used for dosin.7! (2019)   Warfarin maintenance plan:   7.5 mg (5 mg x 1.5) every day   Full warfarin instructions:   : 12.5 mg; Otherwise 7.5 mg every day   Weekly warfarin total:   52.5 mg   Plan last modified:   Carline Reynoso RN (2019)   Next INR check:   2019   Target end date:       Indications    Long-term (current) use of anticoagulants [Z79.01] [Z79.01]  DVT (deep venous thrombosis) (H) (Resolved) [I82.409]             Anticoagulation Episode Summary     INR check location:   Anticoagulation Clinic    Preferred lab:       [facility]    Comments:         Anticoagulation Care Providers     Provider Role Specialty Phone number    Josette Sarmiento MD  [facility] [phone]            See the Encounter Report to view Anticoagulation Flowsheet and Dosing Calendar (Go to Encounters tab in chart review, and find the Anticoagulation Therapy Visit)    INR sub therapeutic at 1.7 today following post op restart of warfarin on .  Patient to take warfarin 12.5 mg today, the resume 7.5 mg daily.    Recheck in 1 week or sooner if problems/concerns.       Carline Reynoso RN

## 2019-04-23 ENCOUNTER — ANTICOAGULATION THERAPY VISIT (OUTPATIENT)
Dept: NURSING | Facility: CLINIC | Age: 54
End: 2019-04-23
Payer: COMMERCIAL

## 2019-04-23 LAB — INR POINT OF CARE: 2 (ref 0.86–1.14)

## 2019-04-23 PROCEDURE — 85610 PROTHROMBIN TIME: CPT | Mod: QW

## 2019-04-23 PROCEDURE — 36416 COLLJ CAPILLARY BLOOD SPEC: CPT

## 2019-04-23 PROCEDURE — 99207 ZZC NO CHARGE NURSE ONLY: CPT

## 2019-04-23 NOTE — PROGRESS NOTES
ANTICOAGULATION FOLLOW-UP CLINIC VISIT    Patient Name:  Barry Herrera  Date:  2019  Contact Type:  Face to Face    SUBJECTIVE:     Patient Findings            OBJECTIVE    INR Protime   Date Value Ref Range Status   2019 2.0 (A) 0.86 - 1.14 Final       ASSESSMENT / PLAN  INR assessment THER    Recheck INR In: 1 WEEK    INR Location Clinic      Anticoagulation Summary  As of 2019    INR goal:   2.0-3.0   TTR:   78.5 % (3.1 y)   INR used for dosin.0 (2019)   Warfarin maintenance plan:   7.5 mg (5 mg x 1.5) every day   Full warfarin instructions:   : 10 mg; : 10 mg; Otherwise 7.5 mg every day   Weekly warfarin total:   52.5 mg   Plan last modified:   Carline Reynoso RN (2019)   Next INR check:   2019   Target end date:       Indications    Long-term (current) use of anticoagulants [Z79.01] [Z79.01]  DVT (deep venous thrombosis) (H) (Resolved) [I82.409]             Anticoagulation Episode Summary     INR check location:   Anticoagulation Clinic    Preferred lab:       Send INR reminders to:    [facility]    Comments:         Anticoagulation Care Providers     Provider Role Specialty Phone number    Josette Sarmiento MD  Family Practice [phone]            See the Encounter Report to view Anticoagulation Flowsheet and Dosing Calendar (Go to Encounters tab in chart review, and find the Anticoagulation Therapy Visit)    INR  therapeutic at 2.0 today on 57.5 mg for weekly tota. Continue 57.5 mg weekly.  Recheck in 1 week or sooner if problems/concerns.       Carline Reynoso, RN

## 2019-04-26 ENCOUNTER — TELEPHONE (OUTPATIENT)
Dept: SURGERY | Facility: CLINIC | Age: 54
End: 2019-04-26

## 2019-04-26 NOTE — TELEPHONE ENCOUNTER
[facility]  Post op call note - No Answer    Barry Herrera was called for an update regarding his recovery.  There was no answer and a message was left instructing the patient to call the office with any questions or concerns.     David Gallagher PA-C

## 2019-04-30 ENCOUNTER — ANTICOAGULATION THERAPY VISIT (OUTPATIENT)
Dept: NURSING | Facility: CLINIC | Age: 54
End: 2019-04-30
Payer: COMMERCIAL

## 2019-04-30 LAB — INR POINT OF CARE: 2.4 (ref 0.86–1.14)

## 2019-04-30 PROCEDURE — 85610 PROTHROMBIN TIME: CPT | Mod: QW

## 2019-04-30 PROCEDURE — 99207 ZZC NO CHARGE NURSE ONLY: CPT

## 2019-04-30 PROCEDURE — 36416 COLLJ CAPILLARY BLOOD SPEC: CPT

## 2019-04-30 NOTE — PROGRESS NOTES
ANTICOAGULATION FOLLOW-UP CLINIC VISIT    Patient Name:  Barry Herrera  Date:  2019  Contact Type:  Face to Face    SUBJECTIVE:     Patient Findings            OBJECTIVE    INR Protime   Date Value Ref Range Status   2019 2.4 (A) 0.86 - 1.14 Final       ASSESSMENT / PLAN  INR assessment THER    Recheck INR In: 10 DAYS    INR Location Clinic      Anticoagulation Summary  As of 2019    INR goal:   2.0-3.0   TTR:   78.6 % (3.2 y)   INR used for dosin.4 (2019)   Warfarin maintenance plan:   10 mg (5 mg x 2) every Tue, Sat; 7.5 mg (5 mg x 1.5) all other days   Full warfarin instructions:   10 mg every Tue, Sat; 7.5 mg all other days   Weekly warfarin total:   57.5 mg   Plan last modified:   Carline Reynoso RN (2019)   Next INR check:   5/10/2019   Target end date:       Indications    Long-term (current) use of anticoagulants [Z79.01] [Z79.01]  DVT (deep venous thrombosis) (H) (Resolved) [I82.409]             Anticoagulation Episode Summary     INR check location:   Anticoagulation Clinic    Preferred lab:       Send INR reminders to:   [facility]    Comments:         Anticoagulation Care Providers     Provider Role Specialty Phone number    Josette Sarmiento MD  Family Practice [phone]            See the Encounter Report to view Anticoagulation Flowsheet and Dosing Calendar (Go to Encounters tab in chart review, and find the Anticoagulation Therapy Visit)    INR  therapeutic at 2.4 today.  Continue warfarin 57.5 mg weekly.  Recheck in 10 days or sooner if problems/concerns.       Carline Reynoso, RN

## 2019-05-10 ENCOUNTER — ANTICOAGULATION THERAPY VISIT (OUTPATIENT)
Dept: NURSING | Facility: CLINIC | Age: 54
End: 2019-05-10
Payer: COMMERCIAL

## 2019-05-10 LAB — INR POINT OF CARE: 2.9 (ref 0.86–1.14)

## 2019-05-10 PROCEDURE — 36416 COLLJ CAPILLARY BLOOD SPEC: CPT

## 2019-05-10 PROCEDURE — 85610 PROTHROMBIN TIME: CPT | Mod: QW

## 2019-05-10 PROCEDURE — 99207 ZZC NO CHARGE NURSE ONLY: CPT

## 2019-05-10 NOTE — PROGRESS NOTES
ANTICOAGULATION FOLLOW-UP CLINIC VISIT    Patient Name:  Barry Herrera  Date:  5/10/2019  Contact Type:  Face to Face    SUBJECTIVE:  Patient Findings         Clinical Outcomes     Negatives:   Major bleeding event, Thromboembolic event, Anticoagulation-related hospital admission, Anticoagulation-related ED visit, Anticoagulation-related fatality           OBJECTIVE    INR Protime   Date Value Ref Range Status   05/10/2019 2.9 (A) 0.86 - 1.14 Final       ASSESSMENT / PLAN  INR assessment THER    Recheck INR In: 2 WEEKS    INR Location Clinic      Anticoagulation Summary  As of 5/10/2019    INR goal:   2.0-3.0   TTR:   78.8 % (3.2 y)   INR used for dosin.9 (5/10/2019)   Warfarin maintenance plan:   10 mg (5 mg x 2) every Tue, Sat; 7.5 mg (5 mg x 1.5) all other days   Full warfarin instructions:   10 mg every Tue, Sat; 7.5 mg all other days   Weekly warfarin total:   57.5 mg   No change documented:   Carline Reynoso RN   Plan last modified:   Carline Reynoso RN (2019)   Next INR check:   2019   Target end date:       Indications    Long-term (current) use of anticoagulants [Z79.01] [Z79.01]  DVT (deep venous thrombosis) (H) (Resolved) [I82.409]             Anticoagulation Episode Summary     INR check location:   Anticoagulation Clinic    Preferred lab:       Send INR reminders to:   [facility]    Comments:         Anticoagulation Care Providers     Provider Role Specialty Phone number    Josette Sarmiento MD  [facility] [phone]            See the Encounter Report to view Anticoagulation Flowsheet and Dosing Calendar (Go to Encounters tab in chart review, and find the Anticoagulation Therapy Visit)    INR  therapeutic at 2.9 today.  Continue 57.5 mg weekly.  Recheck in 2 weeks or sooner if problems/concerns.       Carline Reynoso RN

## 2019-05-24 ENCOUNTER — ANTICOAGULATION THERAPY VISIT (OUTPATIENT)
Dept: NURSING | Facility: CLINIC | Age: 54
End: 2019-05-24
Payer: COMMERCIAL

## 2019-05-24 LAB — INR POINT OF CARE: 2.5 (ref 0.86–1.14)

## 2019-05-24 PROCEDURE — 99207 ZZC NO CHARGE NURSE ONLY: CPT

## 2019-05-24 PROCEDURE — 85610 PROTHROMBIN TIME: CPT | Mod: QW

## 2019-05-24 PROCEDURE — 36416 COLLJ CAPILLARY BLOOD SPEC: CPT

## 2019-05-24 NOTE — PROGRESS NOTES
ANTICOAGULATION FOLLOW-UP CLINIC VISIT    Patient Name:  Barry Herrera  Date:  2019  Contact Type:  Face to Face    SUBJECTIVE:  Patient Findings         Clinical Outcomes     Negatives:   Major bleeding event, Thromboembolic event, Anticoagulation-related hospital admission, Anticoagulation-related ED visit, Anticoagulation-related fatality           OBJECTIVE    INR Protime   Date Value Ref Range Status   2019 2.5 (A) 0.86 - 1.14 Final       ASSESSMENT / PLAN  INR assessment THER    Recheck INR In: 4 WEEKS    INR Location Clinic      Anticoagulation Summary  As of 2019    INR goal:   2.0-3.0   TTR:   79.1 % (3.2 y)   INR used for dosin.5 (2019)   Warfarin maintenance plan:   10 mg (5 mg x 2) every Tue, Sat; 7.5 mg (5 mg x 1.5) all other days   Full warfarin instructions:   10 mg every Tue, Sat; 7.5 mg all other days   Weekly warfarin total:   57.5 mg   No change documented:   Carline Reynoso RN   Plan last modified:   Carline Reynoso RN (2019)   Next INR check:   2019   Target end date:       Indications    Long-term (current) use of anticoagulants [Z79.01] [Z79.01]  DVT (deep venous thrombosis) (H) (Resolved) [I82.409]             Anticoagulation Episode Summary     INR check location:   Anticoagulation Clinic    Preferred lab:       Send INR reminders to:   [facility]    Comments:         Anticoagulation Care Providers     Provider Role Specialty Phone number    Josette Sarmiento MD  [facility] [phone]            See the Encounter Report to view Anticoagulation Flowsheet and Dosing Calendar (Go to Encounters tab in chart review, and find the Anticoagulation Therapy Visit)    INR  therapeutic at 2.5 today.  Patient to continue warfarin 57.5 mg weekly.  Recheck in 4 weeks or sooner if problems/concerns.       Carline Reynoso RN

## 2019-06-04 DIAGNOSIS — Z79.01 LONG TERM CURRENT USE OF ANTICOAGULANT THERAPY: ICD-10-CM

## 2019-06-04 DIAGNOSIS — I82.4Y2 DEEP VEIN THROMBOSIS (DVT) OF PROXIMAL VEIN OF LEFT LOWER EXTREMITY, UNSPECIFIED CHRONICITY (H): ICD-10-CM

## 2019-06-05 RX ORDER — WARFARIN SODIUM 5 MG/1
TABLET ORAL
Qty: 150 TABLET | Refills: 0 | Status: SHIPPED | OUTPATIENT
Start: 2019-06-05 | End: 2019-09-19

## 2019-06-05 NOTE — TELEPHONE ENCOUNTER
"Requested Prescriptions   Pending Prescriptions Disp Refills     JANTOVEN 5 MG tablet [Pharmacy Med Name: JANTOVEN 5MG TABLETS (PEACH)] 135 tablet 0     Sig: TAKE 1 1/2 TABLETS BY MOUTH EVERY DAY OR AS DIRECTED       Vitamin K Antagonists Failed - 6/4/2019  9:53 PM        Failed - INR is within goal in the past 6 weeks     Confirm INR is within goal in the past 6 weeks.     Recent Labs   Lab Test 05/24/19   INR 2.5*                       Passed - Recent (12 mo) or future (30 days) visit within the authorizing provider's specialty     Patient had office visit in the last 12 months or has a visit in the next 30 days with authorizing provider or within the authorizing provider's specialty.  See \"Patient Info\" tab in inbasket, or \"Choose Columns\" in Meds & Orders section of the refill encounter.              Passed - Medication is active on med list        Passed - Patient is 18 years of age or older        Last Written Prescription Date:  3/22/2019  Last Fill Quantity: 135,  # refills: 0   Last office visit: 8/3/2018 with prescribing provider:  8/3/2018   Future Office Visit:      "

## 2019-06-21 ENCOUNTER — ANTICOAGULATION THERAPY VISIT (OUTPATIENT)
Dept: NURSING | Facility: CLINIC | Age: 54
End: 2019-06-21
Payer: COMMERCIAL

## 2019-06-21 DIAGNOSIS — Z79.01 LONG TERM CURRENT USE OF ANTICOAGULANT THERAPY: ICD-10-CM

## 2019-06-21 LAB — INR POINT OF CARE: 2.7 (ref 0.86–1.14)

## 2019-06-21 PROCEDURE — 85610 PROTHROMBIN TIME: CPT | Mod: QW

## 2019-06-21 PROCEDURE — 36416 COLLJ CAPILLARY BLOOD SPEC: CPT

## 2019-06-21 PROCEDURE — 99207 ZZC NO CHARGE NURSE ONLY: CPT

## 2019-06-21 NOTE — PROGRESS NOTES
ANTICOAGULATION FOLLOW-UP CLINIC VISIT    Patient Name:  Barry Herrera  Date:  2019  Contact Type:  Face to Face    SUBJECTIVE:  Patient Findings         Clinical Outcomes     Negatives:   Major bleeding event, Thromboembolic event, Anticoagulation-related hospital admission, Anticoagulation-related ED visit, Anticoagulation-related fatality           OBJECTIVE    INR Protime   Date Value Ref Range Status   2019 2.7 (A) 0.86 - 1.14 Final       ASSESSMENT / PLAN  INR assessment THER    Recheck INR In: 6 WEEKS    INR Location Clinic      Anticoagulation Summary  As of 2019    INR goal:   2.0-3.0   TTR:   79.6 % (3.3 y)   INR used for dosin.7 (2019)   Warfarin maintenance plan:   10 mg (5 mg x 2) every Tue, Sat; 7.5 mg (5 mg x 1.5) all other days   Full warfarin instructions:   10 mg every Tue, Sat; 7.5 mg all other days   Weekly warfarin total:   57.5 mg   No change documented:   Carline Reynoso RN   Plan last modified:   Carline Reynoso RN (2019)   Next INR check:   2019   Target end date:       Indications    Long-term (current) use of anticoagulants [Z79.01] [Z79.01]  DVT (deep venous thrombosis) (H) (Resolved) [I82.409]             Anticoagulation Episode Summary     INR check location:   Anticoagulation Clinic    Preferred lab:       Send INR reminders to:   [facility] [place]    Comments:         Anticoagulation Care Providers     Provider Role Specialty Phone number    Josette Sarmiento MD  [facility] [phone]            See the Encounter Report to view Anticoagulation Flowsheet and Dosing Calendar (Go to Encounters tab in chart review, and find the Anticoagulation Therapy Visit)    INR  therapeutic at 2.7 today.  Patient to continue 57.5 mg weekly.  Recheck in 6 weeks or sooner if problems/concerns.       Carline Reynoso RN

## 2019-08-02 ENCOUNTER — ANTICOAGULATION THERAPY VISIT (OUTPATIENT)
Dept: NURSING | Facility: CLINIC | Age: 54
End: 2019-08-02
Payer: COMMERCIAL

## 2019-08-02 DIAGNOSIS — Z79.01 LONG TERM CURRENT USE OF ANTICOAGULANT THERAPY: ICD-10-CM

## 2019-08-02 LAB — INR POINT OF CARE: 2.8 (ref 0.86–1.14)

## 2019-08-02 PROCEDURE — 85610 PROTHROMBIN TIME: CPT | Mod: QW

## 2019-08-02 PROCEDURE — 99207 ZZC NO CHARGE NURSE ONLY: CPT

## 2019-08-02 PROCEDURE — 36416 COLLJ CAPILLARY BLOOD SPEC: CPT

## 2019-08-02 NOTE — PROGRESS NOTES
ANTICOAGULATION FOLLOW-UP CLINIC VISIT    Patient Name:  Barry Herrera  Date:  2019  Contact Type:  Face to Face    SUBJECTIVE:  Patient Findings         Clinical Outcomes     Negatives:   Major bleeding event, Thromboembolic event, Anticoagulation-related hospital admission, Anticoagulation-related ED visit, Anticoagulation-related fatality           OBJECTIVE    INR Protime   Date Value Ref Range Status   2019 2.8 (A) 0.86 - 1.14 Final       ASSESSMENT / PLAN  INR assessment THER    Recheck INR In: 6 WEEKS    INR Location Clinic      Anticoagulation Summary  As of 2019    INR goal:   2.0-3.0   TTR:   80.3 % (3.4 y)   INR used for dosin.8 (2019)   Warfarin maintenance plan:   10 mg (5 mg x 2) every Tue, Sat; 7.5 mg (5 mg x 1.5) all other days   Full warfarin instructions:   10 mg every Tue, Sat; 7.5 mg all other days   Weekly warfarin total:   57.5 mg   No change documented:   Carline Reynoso RN   Plan last modified:   Carline Reynoso RN (2019)   Next INR check:   2019   Target end date:       Indications    Long-term (current) use of anticoagulants [Z79.01] [Z79.01]  DVT (deep venous thrombosis) (H) (Resolved) [I82.409]             Anticoagulation Episode Summary     INR check location:   Anticoagulation Clinic    Preferred lab:       Send INR reminders to:   [facility] [place]    Comments:         Anticoagulation Care Providers     Provider Role Specialty Phone number    Josette Sarmiento MD  [facility] [phone]            See the Encounter Report to view Anticoagulation Flowsheet and Dosing Calendar (Go to Encounters tab in chart review, and find the Anticoagulation Therapy Visit)    INR therapeutic today at 2.8. Patient to continue warfarin maintenance dose of 57.5 mg. Recheck in 6 weeks or sooner if any changes to health, medication, lifestyle or questions/concerns.    Carline Reynoso RN

## 2019-09-13 ENCOUNTER — ANTICOAGULATION THERAPY VISIT (OUTPATIENT)
Dept: NURSING | Facility: CLINIC | Age: 54
End: 2019-09-13
Payer: COMMERCIAL

## 2019-09-13 DIAGNOSIS — Z79.01 LONG TERM CURRENT USE OF ANTICOAGULANT THERAPY: ICD-10-CM

## 2019-09-13 LAB — INR POINT OF CARE: 3.3 (ref 0.86–1.14)

## 2019-09-13 PROCEDURE — 99207 ZZC NO CHARGE NURSE ONLY: CPT

## 2019-09-13 PROCEDURE — 85610 PROTHROMBIN TIME: CPT | Mod: QW

## 2019-09-13 PROCEDURE — 36416 COLLJ CAPILLARY BLOOD SPEC: CPT

## 2019-09-13 NOTE — PROGRESS NOTES
ANTICOAGULATION FOLLOW-UP CLINIC VISIT    Patient Name:  Barry Herrera  Date:  9/13/2019  Contact Type:  Face to Face    SUBJECTIVE:  Patient Findings     Positives:   Change in alcohol use (had 2 beers instead of 1 last Friday.), Change in diet/appetite (states that he ate a few less greens.)        Clinical Outcomes     Negatives:   Major bleeding event, Thromboembolic event, Anticoagulation-related hospital admission, Anticoagulation-related ED visit, Anticoagulation-related fatality           OBJECTIVE    INR Protime   Date Value Ref Range Status   09/13/2019 3.3 (A) 0.86 - 1.14 Final       ASSESSMENT / PLAN  INR assessment SUPRA    Recheck INR In: 2 WEEKS    INR Location Clinic      Anticoagulation Summary  As of 9/13/2019    INR goal:   2.0-3.0   TTR:   78.9 % (3.5 y)   INR used for dosing:   3.3! (9/13/2019)   Warfarin maintenance plan:   10 mg (5 mg x 2) every Tue, Sat; 7.5 mg (5 mg x 1.5) all other days   Full warfarin instructions:   9/13: 2.5 mg; Otherwise 10 mg every Tue, Sat; 7.5 mg all other days   Weekly warfarin total:   57.5 mg   Plan last modified:   Carline Reynoso RN (4/30/2019)   Next INR check:   9/27/2019   Target end date:       Indications    Long-term (current) use of anticoagulants [Z79.01] [Z79.01]  DVT (deep venous thrombosis) (H) (Resolved) [I82.409]             Anticoagulation Episode Summary     INR check location:   Anticoagulation Clinic    Preferred lab:       Send INR reminders to:   [facility] [place]    Comments:         Anticoagulation Care Providers     Provider Role Specialty Phone number    Josette Sarmiento MD  Family Practice [phone]            See the Encounter Report to view Anticoagulation Flowsheet and Dosing Calendar (Go to Encounters tab in chart review, and find the Anticoagulation Therapy Visit)    INR is supra therapeutic today at 3.3. Patient to take 2.5 mg today which is a hold of 5 mg, and then will resume previous maintenance dose. Recheck INR in 2  weeks.  Patient did not want the flu shot today. States that maybe in 2 weeks.     Carline Reynoso RN

## 2019-09-19 DIAGNOSIS — Z79.01 LONG TERM CURRENT USE OF ANTICOAGULANT THERAPY: ICD-10-CM

## 2019-09-19 DIAGNOSIS — I82.4Y2 DEEP VEIN THROMBOSIS (DVT) OF PROXIMAL VEIN OF LEFT LOWER EXTREMITY, UNSPECIFIED CHRONICITY (H): ICD-10-CM

## 2019-09-19 NOTE — LETTER
September 24, 2019      Barry Herrera  [address]  [place] [address]        Dear Barry,    I care about your health and have reviewed your health plan. I have reviewed your medical conditions, medication list, and lab results and am making recommendations based on this review, to better manage your health.    You are in particular need of attention regarding:  -Medication    I am recommending that you:  -Schedule an appointment    Here is a list of Health Maintenance topics that are due now or due soon:  Health Maintenance Due   Topic Date Due     Preventive Care Visit  1965     Hepatitis C Screening  1965     HIV Screening  11/08/1979     PHQ-2  01/01/2019     Diptheria Tetanus Pertussis (DTAP/TDAP/TD) Vaccine (3 - Td) 08/27/2019     Flu Vaccine (1) 09/01/2019       Please call us at [phone] (or use [facility]) to address the above recommendations.     Thank you for trusting [facility] and we appreciate the opportunity to serve you.  We look forward to supporting your healthcare needs in the future.    Healthy Regards,    Josette Sarmiento MD

## 2019-09-20 RX ORDER — WARFARIN SODIUM 5 MG/1
TABLET ORAL
Qty: 30 TABLET | Refills: 0 | Status: SHIPPED | OUTPATIENT
Start: 2019-09-20 | End: 2019-10-01

## 2019-09-20 NOTE — TELEPHONE ENCOUNTER
Requested Prescriptions   Pending Prescriptions Disp Refills     JANTOVEN ANTICOAGULANT 5 MG tablet [Pharmacy Med Name: JANTOVEN 5MG TABLETS (PEACH)] 150 tablet 0     Sig: TAKE 10 MG TUESDAY, SATURDAY AND 7.5 MG ALL OTHER DAYS AS DIRECTED BY INR CLINIC.  Last Written Prescription Date:  06/05/2019  Last Fill Quantity: 150 tablet,  # refills: 0   Last Office Visit: 8/3/2018 [place]  Future Office Visit:            There is no refill protocol information for this order

## 2019-09-20 NOTE — TELEPHONE ENCOUNTER
Routing refill request to provider for review/approval because:  Patient needs to be seen because it has been more than 1 year since last office visit.    JORGE PerkinsN, RN  [employer] [facility]

## 2019-09-20 NOTE — TELEPHONE ENCOUNTER
Routing to TC to please call patient and advise on MD note below. Thanks.    Celestina Allen RN, BSN  [facility]

## 2019-09-20 NOTE — TELEPHONE ENCOUNTER
30 tablets ordered.  Patient needs to be seen.  Notify him.    Josette Sarmiento MD,MD  [facility] [place]

## 2019-09-23 NOTE — TELEPHONE ENCOUNTER
Christy Gore contacted Barry on 09/23/19 and left a message. If patient calls back please schedule appointment as soon as possible med check.        .Christy MONIQUE    [facility] [place]

## 2019-09-24 NOTE — TELEPHONE ENCOUNTER
Christy Gore contacted Barry on 09/24/19 and left a message. If patient calls back please schedule appointment as soon as possible med check. Letter sent.        .Christy MONIQUE    [facility] [place]

## 2019-09-27 ENCOUNTER — ANTICOAGULATION THERAPY VISIT (OUTPATIENT)
Dept: NURSING | Facility: CLINIC | Age: 54
End: 2019-09-27
Payer: COMMERCIAL

## 2019-09-27 ENCOUNTER — TELEPHONE (OUTPATIENT)
Dept: FAMILY MEDICINE | Facility: CLINIC | Age: 54
End: 2019-09-27

## 2019-09-27 DIAGNOSIS — Z23 NEED FOR PROPHYLACTIC VACCINATION AND INOCULATION AGAINST INFLUENZA: Primary | ICD-10-CM

## 2019-09-27 DIAGNOSIS — Z79.01 LONG TERM CURRENT USE OF ANTICOAGULANT THERAPY: ICD-10-CM

## 2019-09-27 DIAGNOSIS — Z86.718 PERSONAL HISTORY OF DVT (DEEP VEIN THROMBOSIS): Primary | ICD-10-CM

## 2019-09-27 LAB — INR POINT OF CARE: 2.9 (ref 0.86–1.14)

## 2019-09-27 PROCEDURE — 90471 IMMUNIZATION ADMIN: CPT

## 2019-09-27 PROCEDURE — 99207 ZZC NO CHARGE NURSE ONLY: CPT

## 2019-09-27 PROCEDURE — 90682 RIV4 VACC RECOMBINANT DNA IM: CPT

## 2019-09-27 PROCEDURE — 36416 COLLJ CAPILLARY BLOOD SPEC: CPT

## 2019-09-27 PROCEDURE — 85610 PROTHROMBIN TIME: CPT | Mod: QW

## 2019-09-27 NOTE — TELEPHONE ENCOUNTER
Has the patient previously taken warfarin? yes  If yes, for what indication? DVT    Does the patient have any of the following indications for a higher range of 2.5-3.5:    Mitral position mechanical valve? no    Lacie-Shiley, Ball and Cage or Monoleaflet valve (regardless of position) no    Other (if yes, please explain) no    INR referral order pended. Patient scheduled annual office visit for 10/1/19.  Carline Reynoso RN

## 2019-10-01 ENCOUNTER — OFFICE VISIT (OUTPATIENT)
Dept: FAMILY MEDICINE | Facility: CLINIC | Age: 54
End: 2019-10-01
Payer: COMMERCIAL

## 2019-10-01 VITALS
HEIGHT: 71 IN | WEIGHT: 200 LBS | OXYGEN SATURATION: 100 % | HEART RATE: 58 BPM | DIASTOLIC BLOOD PRESSURE: 70 MMHG | BODY MASS INDEX: 28 KG/M2 | SYSTOLIC BLOOD PRESSURE: 126 MMHG | TEMPERATURE: 97.2 F

## 2019-10-01 DIAGNOSIS — Z79.01 LONG TERM CURRENT USE OF ANTICOAGULANT THERAPY: ICD-10-CM

## 2019-10-01 DIAGNOSIS — I82.4Y2 DEEP VEIN THROMBOSIS (DVT) OF PROXIMAL VEIN OF LEFT LOWER EXTREMITY, UNSPECIFIED CHRONICITY (H): Primary | ICD-10-CM

## 2019-10-01 DIAGNOSIS — Z23 NEED FOR VACCINATION: ICD-10-CM

## 2019-10-01 PROCEDURE — 90714 TD VACC NO PRESV 7 YRS+ IM: CPT | Performed by: FAMILY MEDICINE

## 2019-10-01 PROCEDURE — 99213 OFFICE O/P EST LOW 20 MIN: CPT | Mod: 25 | Performed by: FAMILY MEDICINE

## 2019-10-01 PROCEDURE — 90471 IMMUNIZATION ADMIN: CPT | Performed by: FAMILY MEDICINE

## 2019-10-01 RX ORDER — WARFARIN SODIUM 5 MG/1
TABLET ORAL
Qty: 90 TABLET | Refills: 1 | Status: SHIPPED | OUTPATIENT
Start: 2019-10-01 | End: 2020-01-15

## 2019-10-01 ASSESSMENT — MIFFLIN-ST. JEOR: SCORE: 1769.32

## 2019-10-01 NOTE — PROGRESS NOTES
Subjective     Barry Herrera is a 53 year old male who presents to clinic today for the following health issues:    HPI   Medication Followup of warfarin    Taking Medication as prescribed: yes    Side Effects:  None    Medication Helping Symptoms:  yes   Patient has history of DVT.  He is on lifelong warfarin.  Management per [facility].  Denies any recent leg swelling or concerns about blood clots.  No shortness of breath or chest pain.      Patient Active Problem List   Diagnosis     Diverticulosis of large intestine     HORSESHOE KIDNEY     Premature ejaculation     CARDIOVASCULAR SCREENING; LDL GOAL LESS THAN 160     Health Care Home     Long-term (current) use of anticoagulants [Z79.01]     Deep vein thrombosis (DVT) of other vein of left lower extremity     Severe obstructive sleep apnea     Acute chest pain     History of deep venous thrombosis     Abdominal pain, epigastric     Epigastric pain     Pancreatitis     Past Surgical History:   Procedure Laterality Date     C APPENDECTOMY  7/77     HC DUPLEX EXTREMITY VENOUS, LIMITED UNILATERAL  3/2009    left occlusive thrombus groin to calf involving superficial femoral vein, popliteal veins as well as proximal peroneal and post tibial calf veins     LAPAROSCOPIC CHOLECYSTECTOMY WITH CHOLANGIOGRAMS N/A 4/10/2019    Procedure: LAPAROSCOPIC CHOLECYSTECTOMY WITH CHOLANGIOGRAMS;  Surgeon: Ladarius Rizzo MD;  Location:  OR       Social History     Tobacco Use     Smoking status: Never Smoker     Smokeless tobacco: Never Used   Substance Use Topics     Alcohol use: No     Comment: approx 2 drinks a month     Family History   Problem Relation Age of Onset     Blood Disease Brother         Factor V Leiden neg     Cardiovascular Brother         two DVT     Breast Cancer Mother      Aneurysm Father         brain - stroke         Current Outpatient Medications   Medication Sig Dispense Refill     JANTOVEN ANTICOAGULANT 5 MG tablet TAKE 10 MG TUESDAY, SATURDAY AND 7.5 MG  "ALL OTHER DAYS AS DIRECTED BY INR CLINIC. 90 tablet 1     Allergies   Allergen Reactions     No Known Drug Allergies          Reviewed and updated as needed this visit by Provider  Meds         Review of Systems   ROS COMP: CONSTITUTIONAL: NEGATIVE for fever, chills, change in weight  ENT/MOUTH: NEGATIVE for ear, mouth and throat problems  RESP: NEGATIVE for significant cough or SOB  CV: NEGATIVE for chest pain, palpitations or peripheral edema      Objective    /70   Pulse 58   Temp 97.2  F (36.2  C) (Tympanic)   Ht 1.803 m (5' 11\")   Wt 90.7 kg (200 lb)   SpO2 100%   BMI 27.89 kg/m    Body mass index is 27.89 kg/m .  Physical Exam   GENERAL: healthy, alert and no distress  RESP: lungs clear to auscultation - no rales, rhonchi or wheezes  CV: regular rate and rhythm, normal S1 S2, no S3 or S4, no murmur, click or rub, no peripheral edema and peripheral pulses strong  ABDOMEN: soft, nontender, no hepatosplenomegaly, no masses and bowel sounds normal  MS: no gross musculoskeletal defects noted, no edema            Assessment & Plan   Patient continues to go to [facility] once a year for annual examination.  Blood work done there is up-to-date.  Vaccines reviewed today.  Refill on medication ordered as below.  Resume INR management per [facility] clinic.      1. Deep vein thrombosis (DVT) of proximal vein of left lower extremity, unspecified chronicity (H)    - JANTOVEN ANTICOAGULANT 5 MG tablet; TAKE 10 MG TUESDAY, SATURDAY AND 7.5 MG ALL OTHER DAYS AS DIRECTED BY INR CLINIC.  Dispense: 90 tablet; Refill: 1    2. Long term current use of anticoagulant therapy    - JANTOVEN ANTICOAGULANT 5 MG tablet; TAKE 10 MG TUESDAY, SATURDAY AND 7.5 MG ALL OTHER DAYS AS DIRECTED BY INR CLINIC.  Dispense: 90 tablet; Refill: 1    3. Need for vaccination    - TD PRSERV FREE >=7 YRS ADS IM [08078]  - 1st  Administration  [68455]     BMI:   Estimated body mass index is 27.89 kg/m  as calculated from the following:    Height as " "of this encounter: 1.803 m (5' 11\").    Weight as of this encounter: 90.7 kg (200 lb).         Josette Sarmiento MD  [facility] [place]      "

## 2019-10-31 ENCOUNTER — TELEPHONE (OUTPATIENT)
Dept: FAMILY MEDICINE | Facility: CLINIC | Age: 54
End: 2019-10-31

## 2019-10-31 DIAGNOSIS — Z12.11 SPECIAL SCREENING FOR MALIGNANT NEOPLASMS, COLON: Primary | ICD-10-CM

## 2019-10-31 NOTE — TELEPHONE ENCOUNTER
Pt called and would like an order for a colonoscopy. He would like to to dit at [facility]. Last colonoscpoy was done at [place]. Thank you.  Kim More,

## 2019-11-05 ENCOUNTER — HEALTH MAINTENANCE LETTER (OUTPATIENT)
Age: 54
End: 2019-11-05

## 2019-11-07 ENCOUNTER — ANTICOAGULATION THERAPY VISIT (OUTPATIENT)
Dept: NURSING | Facility: CLINIC | Age: 54
End: 2019-11-07
Payer: COMMERCIAL

## 2019-11-07 ENCOUNTER — TELEPHONE (OUTPATIENT)
Dept: FAMILY MEDICINE | Facility: CLINIC | Age: 54
End: 2019-11-07

## 2019-11-07 DIAGNOSIS — I82.4Y2 DEEP VEIN THROMBOSIS (DVT) OF PROXIMAL VEIN OF LEFT LOWER EXTREMITY, UNSPECIFIED CHRONICITY (H): Primary | ICD-10-CM

## 2019-11-07 DIAGNOSIS — Z79.01 LONG TERM CURRENT USE OF ANTICOAGULANT THERAPY: ICD-10-CM

## 2019-11-07 LAB — INR POINT OF CARE: 2.4 (ref 0.86–1.14)

## 2019-11-07 PROCEDURE — 36416 COLLJ CAPILLARY BLOOD SPEC: CPT

## 2019-11-07 PROCEDURE — 85610 PROTHROMBIN TIME: CPT | Mod: QW

## 2019-11-07 NOTE — TELEPHONE ENCOUNTER
Pt is scheduled for a colonoscopy on 11/20.   Ok for pt to start 5 day warfarin hold on 11/15 and does pt need to bridge?    Fouzia CORDOVA RN  EP Triage

## 2019-11-07 NOTE — PROGRESS NOTES
ANTICOAGULATION FOLLOW-UP CLINIC VISIT    Patient Name:  Barry Herrera  Date:  2019  Contact Type:  Face to Face    SUBJECTIVE:  Patient Findings     Comments:   The patient was assessed for diet, medication, and activity level changes, missed or extra doses, bruising or bleeding, with no problem findings.          Clinical Outcomes     Negatives:   Major bleeding event, Thromboembolic event, Anticoagulation-related hospital admission, Anticoagulation-related ED visit, Anticoagulation-related fatality    Comments:   The patient was assessed for diet, medication, and activity level changes, missed or extra doses, bruising or bleeding, with no problem findings.             OBJECTIVE    INR Protime   Date Value Ref Range Status   2019 2.4 (A) 0.86 - 1.14 Final       ASSESSMENT / PLAN  INR assessment THER    Recheck INR In: 2 WEEKS    INR Location Clinic      Anticoagulation Summary  As of 2019    INR goal:   2.0-3.0   TTR:   79.0 % (3.7 y)   INR used for dosin.4 (2019)   Warfarin maintenance plan:   10 mg (5 mg x 2) every Tue, Sat; 7.5 mg (5 mg x 1.5) all other days   Full warfarin instructions:   10 mg every Tue, Sat; 7.5 mg all other days   Weekly warfarin total:   57.5 mg   No change documented:   Fouzia Thompson RN   Plan last modified:   Carline Reynoso RN (2019)   Next INR check:   2019   Target end date:       Indications    Long-term (current) use of anticoagulants [Z79.01] [Z79.01]  DVT (deep venous thrombosis) (H) (Resolved) [I82.409]             Anticoagulation Episode Summary     INR check location:   Anticoagulation Clinic    Preferred lab:       Send INR reminders to:   [facility]    Comments:         Anticoagulation Care Providers     Provider Role Specialty Phone number    Josette Sarmiento MD  [facility] [phone]            See the Encounter Report to view Anticoagulation Flowsheet and Dosing Calendar (Go to Encounters tab in chart review, and  find the Anticoagulation Therapy Visit)    Patient INR is therapeutic.  Patient will continue to take 57.5 mg weekly dosing and follow up in 2 weeks or sooner for any problems or concerns.    Pt has colonoscopy scheduled for 11/20/19.  Advised to start holding on 11/15 and recheck on 11/19.    Fouzia CORDOVA RN  EP Triage

## 2019-11-08 NOTE — TELEPHONE ENCOUNTER
I would recommend bridging with Lovenox.    Josette Sarmiento MD,MD  [facility] [place]

## 2019-11-08 NOTE — TELEPHONE ENCOUNTER
Non detailed message left for pt to return call to clinic and ask to speak with a triage nurse.    Fouzia CORDOVA RN  EP Triage

## 2019-11-11 NOTE — TELEPHONE ENCOUNTER
I spoke with patient and informed him of below. I scheduled him for a bridging appt tomorrow 11/12/19.    He states that since he has been on warfarin he has needed to stop for cholecystectomy and 1 other procedure and has not needed to bridge in the past. Wanted me to double check with PCP this was necessary.     Ronda Shell RN   [facility] - Triage

## 2019-11-11 NOTE — TELEPHONE ENCOUNTER
Pt called back and was given Dr. Sarmiento's message below.  Pt states he is confident he does not need bridging but is now 2nd guessing due to increased risk of blood clot.  Pt will keep appt tomorrow for bridging teaching in INR at 12 pm.     Cr ordered since not done since April and over 90 days.  Pt will do tomorrow 11/12.    Pt states understanding.    Fouzia CORDOVA RN  EP Triage

## 2019-11-11 NOTE — TELEPHONE ENCOUNTER
Detailed message left with info from provider and return number to call with any questions or concerns.    Fouzia CORDOVA RN  EP Triage

## 2019-11-11 NOTE — TELEPHONE ENCOUNTER
Stopping warfarin increases the chance of developing a blood clot.  If the patient is confident that he is done well previously without bridging, we can hold off on bridging.    Stop warfarin for the procedure per protocol.    Josette Sarmiento MD,MD  [facility] [place]

## 2019-11-12 ENCOUNTER — ANTICOAGULATION THERAPY VISIT (OUTPATIENT)
Dept: NURSING | Facility: CLINIC | Age: 54
End: 2019-11-12
Payer: COMMERCIAL

## 2019-11-12 ENCOUNTER — TELEPHONE (OUTPATIENT)
Dept: FAMILY MEDICINE | Facility: CLINIC | Age: 54
End: 2019-11-12

## 2019-11-12 DIAGNOSIS — Z79.01 LONG TERM CURRENT USE OF ANTICOAGULANT THERAPY: ICD-10-CM

## 2019-11-12 PROCEDURE — 99207 ZZC NO CHARGE NURSE ONLY: CPT

## 2019-11-12 NOTE — TELEPHONE ENCOUNTER
I agree with the plan.    Hold warfarin without bridging.    Josette Sarmiento MD,MD  [facility] [place]

## 2019-11-12 NOTE — TELEPHONE ENCOUNTER
Patient notified that he does not need to bridge with Lovenox. He will return to [facility] on 11/19 for pre procedure INR check.  Carline Reynoso RN

## 2019-11-12 NOTE — PROGRESS NOTES
ANTICOAGULATION FOLLOW-UP CLINIC VISIT    Patient Name:  Barry Herrera  Date:  11/12/2019  Contact Type:  Face to Face    SUBJECTIVE:  Patient Findings     Positives:   Upcoming invasive procedure (Colonoscopy scheduled for 11/20)    Comments:   Patient states that he did not bridge for either discectomy in 2013 or cholecystectomy April of this year. Consulting Lucinda Manzo, Clinical Consultant Pharmacist, Pharmacy MTM for recommendation for bridging.         Clinical Outcomes     Negatives:   Major bleeding event, Thromboembolic event, Anticoagulation-related hospital admission, Anticoagulation-related ED visit, Anticoagulation-related fatality    Comments:   Patient states that he did not bridge for either discectomy in 2013 or cholecystectomy April of this year. Consulting Lucinda Manzo, Clinical Consultant Pharmacist, Pharmacy MTM for recommendation for bridging.            OBJECTIVE    INR Protime   Date Value Ref Range Status   11/07/2019 2.4 (A) 0.86 - 1.14 Final       ASSESSMENT / PLAN  No question data found.  Anticoagulation Summary  As of 11/12/2019    INR goal:   2.0-3.0   TTR:   79.0 % (3.7 y)   INR used for dosing:   No new INR was available at the time of this encounter.   Warfarin maintenance plan:   10 mg (5 mg x 2) every Tue, Sat; 7.5 mg (5 mg x 1.5) all other days   Full warfarin instructions:   11/15: Hold; 11/16: Hold; 11/17: Hold; 11/18: Hold; 11/19: Hold; Otherwise 10 mg every Tue, Sat; 7.5 mg all other days   Weekly warfarin total:   57.5 mg   Plan last modified:   Carline Reynoso RN (4/30/2019)   Next INR check:   11/19/2019   Target end date:       Indications    Long-term (current) use of anticoagulants [Z79.01] [Z79.01]  DVT (deep venous thrombosis) (H) (Resolved) [I82.409]             Anticoagulation Episode Summary     INR check location:   Anticoagulation Clinic    Preferred lab:       Send INR reminders to:   [facility]    Comments:          Anticoagulation Care Providers     Provider Role Specialty Phone number    Josette Sarmiento MD  [facility] [phone]            See the Encounter Report to view Anticoagulation Flowsheet and Dosing Calendar (Go to Encounters tab in chart review, and find the Anticoagulation Therapy Visit)    No new INR. Consulting with Lucinda Manzo, Clinical Consultant Pharmacist, Pharmacy MTM for recommendation on bridging.       Carline Reynoso, RN     See 11/12/19 telephone encounter. No bridging needed during hold for colonoscopy per Dr. Sarmiento.

## 2019-11-12 NOTE — TELEPHONE ENCOUNTER
DEBRA-PROCEDURAL ANTICOAGULATION  MANAGEMENT    Assessment     Warfarin interruption plan for Colonoscopy on 2019.    DVT      Risk stratification for thromboembolism: low (2018 American Society of Hematology guideline)      DVT 2008, US in May 2019 showed patent vascular system in leg    VTE: 2018 American Society of Hematology recommends against bridging in low and moderate risk VTE patients, and only  holding warfarin (low risk with VTE >1 year ago & negative thrombophilia testing/no other risk factors)    Plan       Pre-Procedure:    Hold warfarin until after procedure startin/15/2019       Post-Procedure:    Resume home warfarin dose if okay with provider doing procedure on night of procedure, 2019 PM: 7.5mg    Recheck INR 7 days after resuming warfarin   ?   Lucinda Manzo [facility] [place]    Subjective/Objective:      Barry Herrera, a 54 year old male    Reason for Anticoagulation: DVT    Goal INR Range: 2.0-3.0    Patient bridged in past: No    Pertinent History: US completed in leg in May 2019 negative for thrmobus after hold related to surgery    Wt Readings from Last 3 Encounters:   10/01/19 90.7 kg (200 lb)   19 87.9 kg (193 lb 12.8 oz)   18 96.6 kg (213 lb)        Ideal body weight: 75.3 kg (166 lb 0.1 oz)  Adjusted ideal body weight: 81.5 kg (179 lb 9.7 oz)     Lab Results   Component Value Date    INR 2.4 (A) 2019    INR 2.9 (A) 2019    INR 3.3 (A) 2019     Lab Results   Component Value Date    HGB 12.7 2019    HCT 37.5 2019     2019     Lab Results   Component Value Date    CR 0.84 2019    CR 0.90 04/10/2019    CR 1.14 2019     CrCl cannot be calculated (Patient's most recent lab result is older than the maximum 90 days allowed.).

## 2019-11-19 ENCOUNTER — ANTICOAGULATION THERAPY VISIT (OUTPATIENT)
Dept: NURSING | Facility: CLINIC | Age: 54
End: 2019-11-19
Payer: COMMERCIAL

## 2019-11-19 DIAGNOSIS — Z79.01 LONG TERM CURRENT USE OF ANTICOAGULANT THERAPY: ICD-10-CM

## 2019-11-19 LAB — INR POINT OF CARE: 1.1 (ref 0.86–1.14)

## 2019-11-19 PROCEDURE — 36416 COLLJ CAPILLARY BLOOD SPEC: CPT

## 2019-11-19 PROCEDURE — 85610 PROTHROMBIN TIME: CPT | Mod: QW

## 2019-11-19 PROCEDURE — 99207 ZZC NO CHARGE NURSE ONLY: CPT

## 2019-11-19 NOTE — PROGRESS NOTES
ANTICOAGULATION FOLLOW-UP CLINIC VISIT    Patient Name:  Barry Herrera  Date:  2019  Contact Type:  Face to Face    SUBJECTIVE:  Patient Findings         Clinical Outcomes     Negatives:   Major bleeding event, Thromboembolic event, Anticoagulation-related hospital admission, Anticoagulation-related ED visit, Anticoagulation-related fatality           OBJECTIVE    INR Protime   Date Value Ref Range Status   2019 1.1 0.86 - 1.14 Final       ASSESSMENT / PLAN  INR assessment SUB    Recheck INR In: 8 DAYS    INR Location Clinic      Anticoagulation Summary  As of 2019    INR goal:   2.0-3.0   TTR:   78.6 % (3.7 y)   INR used for dosin.1! (2019)   Warfarin maintenance plan:   10 mg (5 mg x 2) every Tue, Sat; 7.5 mg (5 mg x 1.5) all other days   Full warfarin instructions:   : Hold; Otherwise 10 mg every Tue, Sat; 7.5 mg all other days   Weekly warfarin total:   57.5 mg   Plan last modified:   Carline Reynoso RN (2019)   Next INR check:   2019   Target end date:       Indications    Long-term (current) use of anticoagulants [Z79.01] [Z79.01]  DVT (deep venous thrombosis) (H) (Resolved) [I82.409]             Anticoagulation Episode Summary     INR check location:   Anticoagulation Clinic    Preferred lab:       Send INR reminders to:   [facility]    Comments:         Anticoagulation Care Providers     Provider Role Specialty Phone number    Josette Sarmiento MD  [facility] [phone]            See the Encounter Report to view Anticoagulation Flowsheet and Dosing Calendar (Go to Encounters tab in chart review, and find the Anticoagulation Therapy Visit)    INR is sub therapeutic due to hold prior to colonoscopy. Patient verbalizes understanding to restart warfarin evening of procedure unless directed otherwise by doctor performing the colonoscopy.   Recheck in 8 days after resumption of current maintenance dose of 57.5 mg.    Carline Reynoso RN

## 2019-11-20 ENCOUNTER — HOSPITAL ENCOUNTER (OUTPATIENT)
Facility: CLINIC | Age: 54
Discharge: HOME | End: 2019-11-20
Attending: COLON & RECTAL SURGERY | Admitting: COLON & RECTAL SURGERY
Payer: COMMERCIAL

## 2019-11-20 VITALS
WEIGHT: 195 LBS | OXYGEN SATURATION: 96 % | HEART RATE: 54 BPM | RESPIRATION RATE: 13 BRPM | HEIGHT: 71 IN | DIASTOLIC BLOOD PRESSURE: 70 MMHG | BODY MASS INDEX: 27.3 KG/M2 | SYSTOLIC BLOOD PRESSURE: 114 MMHG

## 2019-11-20 LAB — COLONOSCOPY: NORMAL

## 2019-11-20 PROCEDURE — G0500 MOD SEDAT ENDO SERVICE >5YRS: HCPCS | Performed by: COLON & RECTAL SURGERY

## 2019-11-20 PROCEDURE — 88305 TISSUE EXAM BY PATHOLOGIST: CPT | Performed by: COLON & RECTAL SURGERY

## 2019-11-20 PROCEDURE — 45385 COLONOSCOPY W/LESION REMOVAL: CPT | Performed by: COLON & RECTAL SURGERY

## 2019-11-20 PROCEDURE — 88305 TISSUE EXAM BY PATHOLOGIST: CPT | Mod: 26 | Performed by: COLON & RECTAL SURGERY

## 2019-11-20 PROCEDURE — 99153 MOD SED SAME PHYS/QHP EA: CPT

## 2019-11-20 PROCEDURE — 25000128 H RX IP 250 OP 636: Performed by: COLON & RECTAL SURGERY

## 2019-11-20 RX ORDER — FENTANYL CITRATE 50 UG/ML
INJECTION, SOLUTION INTRAMUSCULAR; INTRAVENOUS PRN
Status: DISCONTINUED | OUTPATIENT
Start: 2019-11-20 | End: 2019-11-20 | Stop reason: HOSPADM

## 2019-11-20 RX ORDER — LIDOCAINE 40 MG/G
CREAM TOPICAL
Status: DISCONTINUED | OUTPATIENT
Start: 2019-11-20 | End: 2019-11-20 | Stop reason: HOSPADM

## 2019-11-20 RX ORDER — ONDANSETRON 2 MG/ML
4 INJECTION INTRAMUSCULAR; INTRAVENOUS
Status: DISCONTINUED | OUTPATIENT
Start: 2019-11-20 | End: 2019-11-20 | Stop reason: HOSPADM

## 2019-11-20 ASSESSMENT — MIFFLIN-ST. JEOR: SCORE: 1741.64

## 2019-11-20 NOTE — H&P
Colon & Rectal Surgery History and Physical  Pre-Endoscopy Procedure Note    History of Present Illness   I have been asked by Dr. Sarmiento to evaluate this 54 year old male for colorectal cancer screening. He had a normal colonoscopy in November 2012 and currently denies any abdominal pain, weight loss, bleeding per rectum, or recent change in bowel habits.    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    HORSESHOE KIDNEY     Pancreatitis      Premature ejaculation        Past Surgical History     Past Surgical History:   Procedure Laterality Date     BACK SURGERY      C6-C7 disectomy     APPENDECTOMY  7/77     DUPLEX EXTREMITY VENOUS, LIMITED UNILATERAL  3/2009    left occlusive thrombus groin to calf involving superficial femoral vein, popliteal veins as well as proximal peroneal and post tibial calf veins     LAPAROSCOPIC CHOLECYSTECTOMY WITH CHOLANGIOGRAMS N/A 4/10/2019    Procedure: LAPAROSCOPIC CHOLECYSTECTOMY WITH CHOLANGIOGRAMS;  Surgeon: Ladarius Rizzo MD;  Location:  OR        Medications  Medication Sig     JANTOVEN ANTICOAGULANT 5 MG tablet TAKE 10 MG TUESDAY, SATURDAY AND 7.5 MG ALL OTHER DAYS AS DIRECTED BY INR CLINIC.       Allergies   Allergen Reactions     No Known Drug Allergies         Family History   Family history includes Aneurysm in his father; Blood Disease in his brother; Breast Cancer in his mother; Cardiovascular Disorder in his brother.     Social History   He reports that he has never smoked. He has never used smokeless tobacco. He reports that he does not drink alcohol or use drugs.    Review of Systems   Constitutional:  No fever, weight change or fatigue.    Eyes:     No dry eyes or  "vision changes.   Ears/Nose/Throat/Neck:  No oral ulcers, sore throat or voice change.    Cardiovascular:   No palpitations, syncope, angina or edema.   Respiratory:    No chest pain, excessive sleepiness, shortness of breath or hemoptysis.    Gastrointestinal:   No abdominal pain, nausea, vomiting, diarrhea or heartburn.    Genitourinary:   No dysuria, hematuria, urinary retention or urinary frequency.   Musculoskeletal:  No joint swelling or arthralgias.    Dermatologic:  No skin rash or other skin changes.   Neurologic:    No focal weakness or numbness. No neuropathy.   Psychiatric:    No depression, anxiety, suicidal ideation, or paranoid ideation.   Endocrine:   No cold or heat intolerance, polydipsia, hirsutism, change in libido, or flushing.   Hematology/Lymphatic:  No bleeding or lymphadenopathy.    Allergy/Immunology:  No rhinitis or hives.     Physical Exam   Vitals:  BP (!) 140/87, RR 16, HR 64, height 1.803 m (5' 11\"), weight 88.5 kg (195 lb), SpO2 97 %.    General:  Alert and oriented to person, place and time   Airway: Normal oropharyngeal airway and neck mobility   Lungs:  Clear bilaterally   Heart:  Regular rate and rhythm   Abdomen: Soft, NT, ND, no masses   Rectal:  Perianal skin without excoriation, hemorrhoidal disease or anal fissure        Digital rectal examination reveals normal sphincter tone without masses    ASA Grade: II (mild systemic disease)    Impression: Cleared for use of conscious sedation for colorectal cancer screening    Plan: Proceed with colonoscopy     Dunia Lagunas MD, MD  [facility]  [phone]  "

## 2019-11-21 LAB — COPATH REPORT: NORMAL

## 2019-11-26 ENCOUNTER — OFFICE VISIT (OUTPATIENT)
Dept: FAMILY MEDICINE | Facility: CLINIC | Age: 54
End: 2019-11-26
Payer: COMMERCIAL

## 2019-11-26 VITALS
HEIGHT: 71 IN | DIASTOLIC BLOOD PRESSURE: 62 MMHG | SYSTOLIC BLOOD PRESSURE: 108 MMHG | BODY MASS INDEX: 27.16 KG/M2 | OXYGEN SATURATION: 98 % | WEIGHT: 194 LBS | HEART RATE: 78 BPM | TEMPERATURE: 98.2 F

## 2019-11-26 DIAGNOSIS — Z00.00 ROUTINE GENERAL MEDICAL EXAMINATION AT A HEALTH CARE FACILITY: Primary | ICD-10-CM

## 2019-11-26 DIAGNOSIS — Z12.5 SCREENING FOR PROSTATE CANCER: ICD-10-CM

## 2019-11-26 PROBLEM — K85.90 PANCREATITIS: Status: RESOLVED | Noted: 2019-04-10 | Resolved: 2019-11-26

## 2019-11-26 LAB
ALBUMIN SERPL-MCNC: 4.2 G/DL (ref 3.4–5)
ALP SERPL-CCNC: 61 U/L (ref 40–150)
ALT SERPL W P-5'-P-CCNC: 32 U/L (ref 0–70)
ANION GAP SERPL CALCULATED.3IONS-SCNC: 7 MMOL/L (ref 3–14)
AST SERPL W P-5'-P-CCNC: 20 U/L (ref 0–45)
BILIRUB SERPL-MCNC: 0.5 MG/DL (ref 0.2–1.3)
BUN SERPL-MCNC: 17 MG/DL (ref 7–30)
CALCIUM SERPL-MCNC: 9.2 MG/DL (ref 8.5–10.1)
CHLORIDE SERPL-SCNC: 109 MMOL/L (ref 94–109)
CHOLEST SERPL-MCNC: 199 MG/DL
CO2 SERPL-SCNC: 23 MMOL/L (ref 20–32)
CREAT SERPL-MCNC: 1.08 MG/DL (ref 0.66–1.25)
GFR SERPL CREATININE-BSD FRML MDRD: 77 ML/MIN/{1.73_M2}
GLUCOSE SERPL-MCNC: 100 MG/DL (ref 70–99)
HDLC SERPL-MCNC: 56 MG/DL
HGB BLD-MCNC: 14.1 G/DL (ref 13.3–17.7)
LDLC SERPL CALC-MCNC: 130 MG/DL
NONHDLC SERPL-MCNC: 143 MG/DL
POTASSIUM SERPL-SCNC: 4.3 MMOL/L (ref 3.4–5.3)
PROT SERPL-MCNC: 7.5 G/DL (ref 6.8–8.8)
PSA SERPL-ACNC: 1.36 UG/L (ref 0–4)
SODIUM SERPL-SCNC: 139 MMOL/L (ref 133–144)
TRIGL SERPL-MCNC: 66 MG/DL

## 2019-11-26 PROCEDURE — G0103 PSA SCREENING: HCPCS | Performed by: FAMILY MEDICINE

## 2019-11-26 PROCEDURE — 80053 COMPREHEN METABOLIC PANEL: CPT | Performed by: FAMILY MEDICINE

## 2019-11-26 PROCEDURE — 85018 HEMOGLOBIN: CPT | Performed by: FAMILY MEDICINE

## 2019-11-26 PROCEDURE — 36415 COLL VENOUS BLD VENIPUNCTURE: CPT | Performed by: FAMILY MEDICINE

## 2019-11-26 PROCEDURE — 99396 PREV VISIT EST AGE 40-64: CPT | Performed by: FAMILY MEDICINE

## 2019-11-26 PROCEDURE — 80061 LIPID PANEL: CPT | Performed by: FAMILY MEDICINE

## 2019-11-26 ASSESSMENT — MIFFLIN-ST. JEOR: SCORE: 1736.23

## 2019-11-26 NOTE — PROGRESS NOTES
3  SUBJECTIVE:   CC: Barry Herrera is an 54 year old male who presents for preventive health visit.     Healthy Habits:    Do you get at least three servings of calcium containing foods daily (dairy, green leafy vegetables, etc.)? yes    Amount of exercise or daily activities, outside of work: 4-5 day(s) per week    Problems taking medications regularly No    Medication side effects: No    Have you had an eye exam in the past two years? yes    Do you see a dentist twice per year? yes    Do you have sleep apnea, excessive snoring or daytime drowsiness?uses cpap machine          Today's PHQ-2 Score:   PHQ-2 ( 1999 [employer]) 11/26/2019 10/1/2019   Q1: Little interest or pleasure in doing things 0 0   Q2: Feeling down, depressed or hopeless 0 0   PHQ-2 Score 0 0       Abuse: Current or Past(Physical, Sexual or Emotional)- No  Do you feel safe in your environment? Yes    Have you ever done Advance Care Planning? (For example, a Health Directive, POLST, or a discussion with a medical provider or your loved ones about your wishes): No, advance care planning information given to patient to review.  Patient plans to discuss their wishes with loved ones or provider.      Social History     Tobacco Use     Smoking status: Never Smoker     Smokeless tobacco: Never Used   Substance Use Topics     Alcohol use: No     Comment: approx 2 drinks a month     If you drink alcohol do you typically have >3 drinks per day or >7 drinks per week? No                      Last PSA:   PSA   Date Value Ref Range Status   12/07/2010 1.02 0 - 4 ug/L Final       Reviewed orders with patient. Reviewed health maintenance and updated orders accordingly - Yes  BP Readings from Last 3 Encounters:   11/26/19 108/62   11/20/19 114/70   10/01/19 126/70    Wt Readings from Last 3 Encounters:   11/26/19 88 kg (194 lb)   11/20/19 88.5 kg (195 lb)   10/01/19 90.7 kg (200 lb)                  Patient Active Problem List   Diagnosis     Diverticulosis of large  intestine     HORSESHOE KIDNEY     Premature ejaculation     CARDIOVASCULAR SCREENING; LDL GOAL LESS THAN 160     Health Care Home     Long-term (current) use of anticoagulants [Z79.01]     Deep vein thrombosis (DVT) of other vein of left lower extremity     Severe obstructive sleep apnea- CPAP     Acute chest pain     History of deep venous thrombosis     Abdominal pain, epigastric     Epigastric pain     Past Surgical History:   Procedure Laterality Date     BACK SURGERY      c6-c7 disectomy     C APPENDECTOMY  7/77     HC DUPLEX EXTREMITY VENOUS, LIMITED UNILATERAL  3/2009    left occlusive thrombus groin to calf involving superficial femoral vein, popliteal veins as well as proximal peroneal and post tibial calf veins     LAPAROSCOPIC CHOLECYSTECTOMY WITH CHOLANGIOGRAMS N/A 4/10/2019    Procedure: LAPAROSCOPIC CHOLECYSTECTOMY WITH CHOLANGIOGRAMS;  Surgeon: Ladarius Rizzo MD;  Location:  OR       Social History     Tobacco Use     Smoking status: Never Smoker     Smokeless tobacco: Never Used   Substance Use Topics     Alcohol use: No     Comment: approx 2 drinks a month     Family History   Problem Relation Age of Onset     Blood Disease Brother         Factor V Leiden neg     Cardiovascular Brother         two DVT     Breast Cancer Mother      Aneurysm Father         brain - stroke         Current Outpatient Medications   Medication Sig Dispense Refill     [facility] ANTICOAGULANT 5 MG tablet TAKE 10 MG TUESDAY, SATURDAY AND 7.5 MG ALL OTHER DAYS AS DIRECTED BY INR CLINIC. 90 tablet 1     Allergies   Allergen Reactions     No Known Drug Allergies        Reviewed and updated as needed this visit by clinical staff  Tobacco  Allergies  Meds         Reviewed and updated as needed this visit by Provider            ROS:  CONSTITUTIONAL: NEGATIVE for fever, chills, change in weight  INTEGUMENTARY/SKIN: NEGATIVE for worrisome rashes, moles or lesions  EYES: NEGATIVE for vision changes or irritation  ENT:  "NEGATIVE for ear, mouth and throat problems  RESP: NEGATIVE for significant cough or SOB  CV: NEGATIVE for chest pain, palpitations or peripheral edema  GI: NEGATIVE for nausea, abdominal pain, heartburn, or change in bowel habits   male: negative for dysuria, hematuria, decreased urinary stream, erectile dysfunction, urethral discharge  MUSCULOSKELETAL: NEGATIVE for significant arthralgias or myalgia  NEURO: NEGATIVE for weakness, dizziness or paresthesias  PSYCHIATRIC: NEGATIVE for changes in mood or affect    OBJECTIVE:   /62   Pulse 78   Temp 98.2  F (36.8  C) (Tympanic)   Ht 1.802 m (5' 10.95\")   Wt 88 kg (194 lb)   SpO2 98%   BMI 27.10 kg/m    EXAM:  GENERAL: healthy, alert and no distress  EYES: Eyes grossly normal to inspection, PERRL and conjunctivae and sclerae normal  HENT: ear canals and TM's normal, nose and mouth without ulcers or lesions  NECK: no adenopathy, no asymmetry, masses, or scars and thyroid normal to palpation  RESP: lungs clear to auscultation - no rales, rhonchi or wheezes  CV: regular rate and rhythm, normal S1 S2, no S3 or S4, no murmur, click or rub, no peripheral edema and peripheral pulses strong  ABDOMEN: soft, nontender, no hepatosplenomegaly, no masses and bowel sounds normal  MS: no gross musculoskeletal defects noted, no edema  SKIN: no suspicious lesions or rashes  NEURO: Normal strength and tone, mentation intact and speech normal  PSYCH: mentation appears normal, affect normal/bright        ASSESSMENT/PLAN:       ICD-10-CM    1. Routine general medical examination at a health care facility Z00.00 Lipid panel reflex to direct LDL Fasting     Comprehensive metabolic panel     Hemoglobin   2. Screening for prostate cancer Z12.5 Prostate spec antigen screen     Screening fasting labs ordered.      COUNSELING:  Reviewed preventive health counseling, as reflected in patient instructions       Regular exercise       Healthy diet/nutrition    Estimated body mass index " "is 27.1 kg/m  as calculated from the following:    Height as of this encounter: 1.802 m (5' 10.95\").    Weight as of this encounter: 88 kg (194 lb).    Weight management plan: Discussed healthy diet and exercise guidelines     reports that he has never smoked. He has never used smokeless tobacco.      Counseling Resources:  ATP IV Guidelines  Pooled Cohorts Equation Calculator  FRAX Risk Assessment  ICSI Preventive Guidelines  Dietary Guidelines for Americans, 2010  USDA's MyPlate  ASA Prophylaxis  Lung CA Screening    Josette Sarmiento MD  [facility]  "

## 2019-11-27 ENCOUNTER — ANTICOAGULATION THERAPY VISIT (OUTPATIENT)
Dept: NURSING | Facility: CLINIC | Age: 54
End: 2019-11-27
Payer: COMMERCIAL

## 2019-11-27 DIAGNOSIS — Z79.01 LONG TERM CURRENT USE OF ANTICOAGULANT THERAPY: ICD-10-CM

## 2019-11-27 LAB — INR POINT OF CARE: 2.1 (ref 0.86–1.14)

## 2019-11-27 PROCEDURE — 85610 PROTHROMBIN TIME: CPT | Mod: QW

## 2019-11-27 PROCEDURE — 99207 ZZC NO CHARGE NURSE ONLY: CPT

## 2019-11-27 PROCEDURE — 36416 COLLJ CAPILLARY BLOOD SPEC: CPT

## 2019-11-27 NOTE — PROGRESS NOTES
ANTICOAGULATION FOLLOW-UP CLINIC VISIT    Patient Name:  Barry Herrera  Date:  2019  Contact Type:  Face to Face    SUBJECTIVE:  Patient Findings     Comments:   The patient was assessed for diet, medication, and activity level changes, missed or extra doses, bruising or bleeding, with no problem findings.          Clinical Outcomes     Negatives:   Major bleeding event, Thromboembolic event, Anticoagulation-related hospital admission, Anticoagulation-related ED visit, Anticoagulation-related fatality    Comments:   The patient was assessed for diet, medication, and activity level changes, missed or extra doses, bruising or bleeding, with no problem findings.             OBJECTIVE    INR Protime   Date Value Ref Range Status   2019 2.1 (A) 0.86 - 1.14 Final       ASSESSMENT / PLAN  INR assessment THER    Recheck INR In: 6 WEEKS    INR Location Clinic      Anticoagulation Summary  As of 2019    INR goal:   2.0-3.0   TTR:   82.0 % (12 mo)   INR used for dosin.1 (2019)   Warfarin maintenance plan:   10 mg (5 mg x 2) every Tue, Sat; 7.5 mg (5 mg x 1.5) all other days   Full warfarin instructions:   10 mg every Tue, Sat; 7.5 mg all other days   Weekly warfarin total:   57.5 mg   No change documented:   Renetta Shell RN   Plan last modified:   Carline Reynoso RN (2019)   Next INR check:   2020   Priority:   Maintenance   Target end date:       Indications    Long-term (current) use of anticoagulants [Z79.01] [Z79.01]  DVT (deep venous thrombosis) (H) (Resolved) [I82.409]             Anticoagulation Episode Summary     INR check location:   Anticoagulation Clinic    Preferred lab:       Send INR reminders to:   [facility] [place]    Comments:         Anticoagulation Care Providers     Provider Role Specialty Phone number    Josette Sarmiento MD  [facility] [phone]            See the Encounter Report to view Anticoagulation Flowsheet and Dosing Calendar (Go to  Encounters tab in chart review, and find the Anticoagulation Therapy Visit)    Patient INR is therapeutic.  Patient will continue to take 57.5 mg weekly dosing and follow up in 6 weeks or sooner for any problems or concerns.        Renetta Shell RN

## 2019-12-10 NOTE — PROGRESS NOTES
[facility] - PRIMARY CARE SKIN    CC: skin cancer screening (full-body)  SUBJECTIVE:   Barry Herrera is a(n) 54 year old male who presents to clinic today for a full-body skin exam.    Bothersome lesions noticed by the patient or other skin concerns :  Issue One:  none  Personal Medical History  Skin cancer: NO  Eczema Psoriasis Lupus   NO NO NO   Other: .    Family Medical History  Skin cancer: NO  Eczema Psoriasis Lupus   daughter NO NO   Other: .    Sun Exposure History  Previous history of significant sun exposure:  Blistering sunburns: NO.  Tanning beds: NO.  Sunscreen usage: SPF -30    Occupation: indoor ().    Refer to electronic medical record (EMR) for past medical history and medications.      ROS: 14 point review of systems was negative except the symptoms listed above in the HPI.        OBJECTIVE:   GENERAL: healthy, alert and no distress.  SKIN: Fowler Skin Type - III.  This patient was examined from the top of the head to the bottom of the feet  including scalp, face, neck, trunk, buttocks, both arms, both legs, both hands, both feet, and all fingers and toes. The dermatoscope was used to help evaluate pigmented lesions.  Skin Pertinent Findings:  Face: solar lentigo , scattered sebaceous hyperplasia.    Trunk, arms , legs ,            Brown, macule(s) most consistent with benign solar lentigo            Raised, coarse textured, stuck appearing lesion consistent with seborrheic keratosis .            Slightly raised, red lesion(s) consistent with capillary hemangioma            Brown macules of various sizes and shapes most consistent with (benign) melanocytic nevi             Significant Findings:  Right upper abdomen- 4 mm raised, smooth, black lesion ? Seborrheic keratosis ? Atypical ? Other              ASSESSMENT:     Encounter Diagnoses   Name Primary?     Skin exam for malignant neoplasm Yes     Seborrheic keratosis      Solar lentigo      Neoplasm of uncertain behavior of skin       "Melanocytic nevi of trunk          PLAN:   Patient Instructions   DRY SKIN MANAGEMENT INSTRUCTIONS  Routine use of moisturizer is important for healthy, resilient skin not just for soft skin.     Sealing in moisture    Twice daily use of a moisturizer such as over-the-counter (OTC) CeraVe moisturizer cream (in the jar). CeraVe products contain ceramides and filaggrin proteins that can help to maintain the body's moisture layer.    Twice daily use of a moisturizer such as OTC Vaseline petroleum jelly or OTC Aquaphor ointment.    After cleansing or washing, always apply moisturizer immediately after drying off (pat dry only) for best effect.    Protection while hydrating    Do not overuse soap. Unless you have been sweating extensively, just apply soap to groin and armpits.    Recommended products for body include: OTC unscented Dove for sensitive skin or OTC Vanicream cleansing bar.    Recommended facial cleansers include: OTC CeraVe hydrating facial cleanser or OTC Cetaphil daily facial cleanser.    Always try to wear rubber gloves when washing dishes or cleaning.    Avoid use of    Scented/perfumed products    Irritating clothing (wool, new jeans, new/unwashed clothing, scratchy synthetics)    Neosporin or triple antibiotic topical products    Products containing aloe, herbs, Vitamin E, or other \"natural ingredients\".    Dryer sheets or fabric softeners (while symptoms are present)    If a topical medication is prescribed, apply topical prescription first, followed by use of moisturizing product.    FUTURE APPOINTMENTS  Follow up per pathology report.   Vaseline and a bandage for 5-7 days.   Skin exam recommendation once a year.     WOUND CARE INSTRUCTIONS  1. Wash hands before every dressing change.  2. After 24 hours, change dressing daily.  3. Wash the wound area with a mild soap, then rinse.  4. Gently pat dry with a sterile gauze or Q-tip.  5. Using a Q-tip, apply Vaseline or Aquaphor only over entire wound. " "Do NOT use Neosporin - as many people react to neomycin.  6. Finally, cover with a bandage or sterile non-stick gauze with micropore paper tape.  7. Repeat once daily until wound has healed.      Soap, water and shampoo will not hurt this area.    Do not go swimming or take baths, but showering is encouraged.    Limit use of the area where the procedure was done for a few days to allow for optimal healing.    If you experience bleeding:  Wash hands and hold firm pressure on the area for 10 minutes without checking to see if the bleeding has stopped. \"Checking\" pulls off the protective wound clot and restarts the bleeding all over again. Re-apply pressure for 10 minutes if necessary to stop bleeding.  Use additional sterile gauze and tape to maintain pressure once bleeding has stopped.  If bleeding continues, then call back to clinic at [phone].    Signs of Infection:  Infection can occur in any area where skin has been disrupted.  If you notice persistent redness, swelling, colored drainage, increasing pain, fever or other signs of infection, please call us at: [phone] and ask to have me or my colleague paged. We will call you back to discuss.    Pathology Results:  You will be notified, generally via letter or MyChart, in approximately 10 days. If there is anything we need to discuss or further treatment needed, I will call you to discuss it.    PATIENT INFORMATION : WOUNDS  During the healing process you will notice a number of changes. All wounds develop a small halo of redness surrounding the wound.  This means healing is occurring. Severe itching with extensive redness usually indicates sensitivity to the ointment or bandage tape used to dress the wound.  You should call our office if this develops.      Swelling  and/or discoloration around your surgical site is common, particularly when performed around the eye.    All wounds normally drain.  The larger the wound the more drainage there will " be.  After 7-10 days, you will notice the wound beginning to shrink and new skin will begin to grow.  The wound is healed when you can see skin has formed over the entire area.  A healed wound has a healthy, shiny look to the surface and is red to dark pink in color to normalize.  Wounds may take approximately 4-6 weeks to heal.  Larger wounds may take 6-8 weeks. After the wound is healed you may discontinue dressing changes.    You may experience a sensation of tightness as your wound heals. This is normal and will gradually subside.    Your healed wound may be sensitive to temperature changes. This sensitivity improves with time, but if you re having a lot of discomfort, try to avoid temperature extremes.    Patients frequently experience itching after their wound appears to have healed because of the continue healing under the skin.  Plain Vaseline will help relieve the itching.          The patient was counseled about sunscreens and sun avoidance. The patient was counseled to check the skin regularly and report any lesion that is new, changing, itching, scabbing, bleeding or otherwise bothersome. The patient was discharged ambulatory and in stable condition.    Educational brochures given to patient: skin cancer.    TT: 25 minutes.  CT: 15 minutes.

## 2019-12-11 ENCOUNTER — OFFICE VISIT (OUTPATIENT)
Dept: FAMILY MEDICINE | Facility: CLINIC | Age: 54
End: 2019-12-11
Payer: COMMERCIAL

## 2019-12-11 VITALS — SYSTOLIC BLOOD PRESSURE: 118 MMHG | DIASTOLIC BLOOD PRESSURE: 70 MMHG

## 2019-12-11 DIAGNOSIS — L81.4 SOLAR LENTIGO: ICD-10-CM

## 2019-12-11 DIAGNOSIS — D22.5 MELANOCYTIC NEVI OF TRUNK: ICD-10-CM

## 2019-12-11 DIAGNOSIS — L82.1 SEBORRHEIC KERATOSIS: ICD-10-CM

## 2019-12-11 DIAGNOSIS — Z12.83 SKIN EXAM FOR MALIGNANT NEOPLASM: Primary | ICD-10-CM

## 2019-12-11 DIAGNOSIS — D48.5 NEOPLASM OF UNCERTAIN BEHAVIOR OF SKIN: ICD-10-CM

## 2019-12-11 PROCEDURE — 99213 OFFICE O/P EST LOW 20 MIN: CPT | Mod: 25 | Performed by: FAMILY MEDICINE

## 2019-12-11 PROCEDURE — 11300 SHAVE SKIN LESION 0.5 CM/<: CPT | Performed by: FAMILY MEDICINE

## 2019-12-11 PROCEDURE — 88305 TISSUE EXAM BY PATHOLOGIST: CPT | Mod: TC | Performed by: FAMILY MEDICINE

## 2019-12-11 NOTE — LETTER
12/11/2019         RE: Barry Herrera  [id number]  [place] [address]        Dear Colleague,    Thank you for referring your patient, Barry Herrera, to the [facility]. Please see a copy of my visit note below.    [facility] - PRIMARY CARE SKIN    CC: skin cancer screening (full-body)  SUBJECTIVE:   Barry Herrera is a(n) 54 year old male who presents to clinic today for a full-body skin exam.    Bothersome lesions noticed by the patient or other skin concerns :  Issue One:  none  Personal Medical History  Skin cancer: NO  Eczema Psoriasis Lupus   NO NO NO   Other: .    Family Medical History  Skin cancer: NO  Eczema Psoriasis Lupus   daughter NO NO   Other: .    Sun Exposure History  Previous history of significant sun exposure:  Blistering sunburns: NO.  Tanning beds: NO.  Sunscreen usage: SPF -30    Occupation: indoor ().    Refer to electronic medical record (EMR) for past medical history and medications.      ROS: 14 point review of systems was negative except the symptoms listed above in the HPI.        OBJECTIVE:   GENERAL: healthy, alert and no distress.  SKIN: Fowler Skin Type - III.  This patient was examined from the top of the head to the bottom of the feet  including scalp, face, neck, trunk, buttocks, both arms, both legs, both hands, both feet, and all fingers and toes. The dermatoscope was used to help evaluate pigmented lesions.  Skin Pertinent Findings:  Face: solar lentigo , scattered sebaceous hyperplasia.    Trunk, arms , legs ,            Brown, macule(s) most consistent with benign solar lentigo            Raised, coarse textured, stuck appearing lesion consistent with seborrheic keratosis .            Slightly raised, red lesion(s) consistent with capillary hemangioma            Brown macules of various sizes and shapes most consistent with (benign) melanocytic nevi             Significant Findings:  Right upper abdomen- 4 mm raised,  "smooth, black lesion ? Seborrheic keratosis ? Atypical ? Other              ASSESSMENT:     Encounter Diagnoses   Name Primary?     Skin exam for malignant neoplasm Yes     Seborrheic keratosis      Solar lentigo      Neoplasm of uncertain behavior of skin      Melanocytic nevi of trunk          PLAN:   Patient Instructions   DRY SKIN MANAGEMENT INSTRUCTIONS  Routine use of moisturizer is important for healthy, resilient skin not just for soft skin.     Sealing in moisture    Twice daily use of a moisturizer such as over-the-counter (OTC) CeraVe moisturizer cream (in the jar). CeraVe products contain ceramides and filaggrin proteins that can help to maintain the body's moisture layer.    Twice daily use of a moisturizer such as OTC Vaseline petroleum jelly or OTC Aquaphor ointment.    After cleansing or washing, always apply moisturizer immediately after drying off (pat dry only) for best effect.    Protection while hydrating    Do not overuse soap. Unless you have been sweating extensively, just apply soap to groin and armpits.    Recommended products for body include: OTC unscented Dove for sensitive skin or OTC Vanicream cleansing bar.    Recommended facial cleansers include: OTC CeraVe hydrating facial cleanser or OTC Cetaphil daily facial cleanser.    Always try to wear rubber gloves when washing dishes or cleaning.    Avoid use of    Scented/perfumed products    Irritating clothing (wool, new jeans, new/unwashed clothing, scratchy synthetics)    Neosporin or triple antibiotic topical products    Products containing aloe, herbs, Vitamin E, or other \"natural ingredients\".    Dryer sheets or fabric softeners (while symptoms are present)    If a topical medication is prescribed, apply topical prescription first, followed by use of moisturizing product.    FUTURE APPOINTMENTS  Follow up per pathology report.   Vaseline and a bandage for 5-7 days.   Skin exam recommendation once a year.     WOUND CARE " "INSTRUCTIONS  1. Wash hands before every dressing change.  2. After 24 hours, change dressing daily.  3. Wash the wound area with a mild soap, then rinse.  4. Gently pat dry with a sterile gauze or Q-tip.  5. Using a Q-tip, apply Vaseline or Aquaphor only over entire wound. Do NOT use Neosporin - as many people react to neomycin.  6. Finally, cover with a bandage or sterile non-stick gauze with micropore paper tape.  7. Repeat once daily until wound has healed.      Soap, water and shampoo will not hurt this area.    Do not go swimming or take baths, but showering is encouraged.    Limit use of the area where the procedure was done for a few days to allow for optimal healing.    If you experience bleeding:  Wash hands and hold firm pressure on the area for 10 minutes without checking to see if the bleeding has stopped. \"Checking\" pulls off the protective wound clot and restarts the bleeding all over again. Re-apply pressure for 10 minutes if necessary to stop bleeding.  Use additional sterile gauze and tape to maintain pressure once bleeding has stopped.  If bleeding continues, then call back to clinic at [phone].    Signs of Infection:  Infection can occur in any area where skin has been disrupted.  If you notice persistent redness, swelling, colored drainage, increasing pain, fever or other signs of infection, please call us at: [phone] and ask to have me or my colleague paged. We will call you back to discuss.    Pathology Results:  You will be notified, generally via letter or MyChart, in approximately 10 days. If there is anything we need to discuss or further treatment needed, I will call you to discuss it.    PATIENT INFORMATION : WOUNDS  During the healing process you will notice a number of changes. All wounds develop a small halo of redness surrounding the wound.  This means healing is occurring. Severe itching with extensive redness usually indicates sensitivity to the ointment or bandage " tape used to dress the wound.  You should call our office if this develops.      Swelling  and/or discoloration around your surgical site is common, particularly when performed around the eye.    All wounds normally drain.  The larger the wound the more drainage there will be.  After 7-10 days, you will notice the wound beginning to shrink and new skin will begin to grow.  The wound is healed when you can see skin has formed over the entire area.  A healed wound has a healthy, shiny look to the surface and is red to dark pink in color to normalize.  Wounds may take approximately 4-6 weeks to heal.  Larger wounds may take 6-8 weeks. After the wound is healed you may discontinue dressing changes.    You may experience a sensation of tightness as your wound heals. This is normal and will gradually subside.    Your healed wound may be sensitive to temperature changes. This sensitivity improves with time, but if you re having a lot of discomfort, try to avoid temperature extremes.    Patients frequently experience itching after their wound appears to have healed because of the continue healing under the skin.  Plain Vaseline will help relieve the itching.          The patient was counseled about sunscreens and sun avoidance. The patient was counseled to check the skin regularly and report any lesion that is new, changing, itching, scabbing, bleeding or otherwise bothersome. The patient was discharged ambulatory and in stable condition.    Educational brochures given to patient: skin cancer.    TT: 25 minutes.  CT: 15 minutes.        Again, thank you for allowing me to participate in the care of your patient.        Sincerely,        Sherie Hopper MD

## 2019-12-11 NOTE — PROCEDURES
Name : Shave Excision  Indication : Excision of tissue for pathology evaluation.  Location(s) : Right upper abdomen- 4 mm raised, smooth, black lesion ? Seborrheic keratosis ? Atypical ? other  Completed by : Madalyn Hopper MD  Photo Taken : yes.  Anesthesia : Patient was anesthetized by infiltrating the area surrounding the lesion with 1% lidocaine.   epinephrine [id number] : Yes.  Note : Discussed the risk of pain, infection, scarring, hypo- or hyperpigmentation and recurrence or need for re-treatment. The benefits of treatment and alternative treatments were also discussed.    During this procedure, the universal protocol was utilized. The patient's identity was confirmed by no less than two patient identifiers, correct procedure was verified, correct site was verified and marked as applicable and a final pause was completed.    Sterile technique was used throughout the procedure. The skin was cleaned and prepped with surgical cleanser. Once adequate anesthesia was obtained, the lesion was removed with a deep scallop shave procedure. The specimen was sent to pathology.    Direct pressure and aluminum chloride and monopolar cautery was applied for hemostasis. No bleeding was present upon the completion of the procedure. The wound was coated with antibacterial ointment. A dry sterile dressing was applied. Patient tolerated the procedure well and left in satisfactory condition.    Primary provider and referring provider will be informed regarding the tissue report when it returns.

## 2019-12-11 NOTE — PATIENT INSTRUCTIONS
"DRY SKIN MANAGEMENT INSTRUCTIONS  Routine use of moisturizer is important for healthy, resilient skin not just for soft skin.     Sealing in moisture    Twice daily use of a moisturizer such as over-the-counter (OTC) CeraVe moisturizer cream (in the jar). CeraVe products contain ceramides and filaggrin proteins that can help to maintain the body's moisture layer.    Twice daily use of a moisturizer such as OTC Vaseline petroleum jelly or OTC Aquaphor ointment.    After cleansing or washing, always apply moisturizer immediately after drying off (pat dry only) for best effect.    Protection while hydrating    Do not overuse soap. Unless you have been sweating extensively, just apply soap to groin and armpits.    Recommended products for body include: OTC unscented Dove for sensitive skin or OTC Vanicream cleansing bar.    Recommended facial cleansers include: OTC CeraVe hydrating facial cleanser or OTC Cetaphil daily facial cleanser.    Always try to wear rubber gloves when washing dishes or cleaning.    Avoid use of    Scented/perfumed products    Irritating clothing (wool, new jeans, new/unwashed clothing, scratchy synthetics)    Neosporin or triple antibiotic topical products    Products containing aloe, herbs, Vitamin E, or other \"natural ingredients\".    Dryer sheets or fabric softeners (while symptoms are present)    If a topical medication is prescribed, apply topical prescription first, followed by use of moisturizing product.    FUTURE APPOINTMENTS  Follow up per pathology report.   Vaseline and a bandage for 5-7 days.   Skin exam recommendation once a year.     WOUND CARE INSTRUCTIONS  1. Wash hands before every dressing change.  2. After 24 hours, change dressing daily.  3. Wash the wound area with a mild soap, then rinse.  4. Gently pat dry with a sterile gauze or Q-tip.  5. Using a Q-tip, apply Vaseline or Aquaphor only over entire wound. Do NOT use Neosporin - as many people react to neomycin.  6. " "Finally, cover with a bandage or sterile non-stick gauze with micropore paper tape.  7. Repeat once daily until wound has healed.      Soap, water and shampoo will not hurt this area.    Do not go swimming or take baths, but showering is encouraged.    Limit use of the area where the procedure was done for a few days to allow for optimal healing.    If you experience bleeding:  Wash hands and hold firm pressure on the area for 10 minutes without checking to see if the bleeding has stopped. \"Checking\" pulls off the protective wound clot and restarts the bleeding all over again. Re-apply pressure for 10 minutes if necessary to stop bleeding.  Use additional sterile gauze and tape to maintain pressure once bleeding has stopped.  If bleeding continues, then call back to clinic at [phone].    Signs of Infection:  Infection can occur in any area where skin has been disrupted.  If you notice persistent redness, swelling, colored drainage, increasing pain, fever or other signs of infection, please call us at: [phone] and ask to have me or my colleague paged. We will call you back to discuss.    Pathology Results:  You will be notified, generally via letter or MyChart, in approximately 10 days. If there is anything we need to discuss or further treatment needed, I will call you to discuss it.    PATIENT INFORMATION : WOUNDS  During the healing process you will notice a number of changes. All wounds develop a small halo of redness surrounding the wound.  This means healing is occurring. Severe itching with extensive redness usually indicates sensitivity to the ointment or bandage tape used to dress the wound.  You should call our office if this develops.      Swelling  and/or discoloration around your surgical site is common, particularly when performed around the eye.    All wounds normally drain.  The larger the wound the more drainage there will be.  After 7-10 days, you will notice the wound beginning to " shrink and new skin will begin to grow.  The wound is healed when you can see skin has formed over the entire area.  A healed wound has a healthy, shiny look to the surface and is red to dark pink in color to normalize.  Wounds may take approximately 4-6 weeks to heal.  Larger wounds may take 6-8 weeks. After the wound is healed you may discontinue dressing changes.    You may experience a sensation of tightness as your wound heals. This is normal and will gradually subside.    Your healed wound may be sensitive to temperature changes. This sensitivity improves with time, but if you re having a lot of discomfort, try to avoid temperature extremes.    Patients frequently experience itching after their wound appears to have healed because of the continue healing under the skin.  Plain Vaseline will help relieve the itching.

## 2019-12-17 LAB — COPATH REPORT: NORMAL

## 2019-12-18 ENCOUNTER — TELEPHONE (OUTPATIENT)
Dept: FAMILY MEDICINE | Facility: CLINIC | Age: 54
End: 2019-12-18

## 2019-12-18 NOTE — TELEPHONE ENCOUNTER
----- Message from Sherie Hopper MD sent at 12/18/2019  8:13 AM CST -----  Barry,     Good news, the lesion was benign and called a seborrheic keratosis . No further treatment is needed.      Thank you for allowing me to be involved in your health care.  If you have any questions or concerns please feel free to contact me.  Sherie Hopper M.D.  [facility]

## 2019-12-19 NOTE — TELEPHONE ENCOUNTER
Patient notified of test results and providers message, patient has no further questions.    Rosalia COTARN BSN  [facility]  [phone]

## 2020-01-08 ENCOUNTER — ANTICOAGULATION THERAPY VISIT (OUTPATIENT)
Dept: NURSING | Facility: CLINIC | Age: 55
End: 2020-01-08
Payer: COMMERCIAL

## 2020-01-08 DIAGNOSIS — Z79.01 LONG TERM CURRENT USE OF ANTICOAGULANT THERAPY: ICD-10-CM

## 2020-01-08 LAB — INR POINT OF CARE: 2.8 (ref 0.86–1.14)

## 2020-01-08 PROCEDURE — 99207 ZZC NO CHARGE NURSE ONLY: CPT

## 2020-01-08 PROCEDURE — 36416 COLLJ CAPILLARY BLOOD SPEC: CPT

## 2020-01-08 PROCEDURE — 85610 PROTHROMBIN TIME: CPT | Mod: QW

## 2020-01-08 NOTE — PROGRESS NOTES
ANTICOAGULATION FOLLOW-UP CLINIC VISIT    Patient Name:  Barry Herrera  Date:  2020  Contact Type:  Face to Face    SUBJECTIVE:  Patient Findings     Comments:   The patient was assessed for diet, medication, and activity level changes, missed or extra doses, bruising or bleeding, with no problem findings.          Clinical Outcomes     Negatives:   Major bleeding event, Thromboembolic event, Anticoagulation-related hospital admission, Anticoagulation-related ED visit, Anticoagulation-related fatality    Comments:   The patient was assessed for diet, medication, and activity level changes, missed or extra doses, bruising or bleeding, with no problem findings.             OBJECTIVE    INR Protime   Date Value Ref Range Status   2020 2.8 (A) 0.86 - 1.14 Final       ASSESSMENT / PLAN  INR assessment THER    Recheck INR In: 6 WEEKS    INR Location Clinic      Anticoagulation Summary  As of 2020    INR goal:   2.0-3.0   TTR:   83.6 % (12 mo)   INR used for dosin.8 (2020)   Warfarin maintenance plan:   10 mg (5 mg x 2) every Tue, Sat; 7.5 mg (5 mg x 1.5) all other days   Full warfarin instructions:   10 mg every Tue, Sat; 7.5 mg all other days   Weekly warfarin total:   57.5 mg   Plan last modified:   Carline Reynoso RN (2019)   Next INR check:   2020   Priority:   Maintenance   Target end date:       Indications    Long-term (current) use of anticoagulants [Z79.01] [Z79.01]  DVT (deep venous thrombosis) (H) (Resolved) [I82.409]             Anticoagulation Episode Summary     INR check location:   Anticoagulation Clinic    Preferred lab:       Send INR reminders to:   [facility]    Comments:         Anticoagulation Care Providers     Provider Role Specialty Phone number    Josette Sarmiento MD  Family Practice [phone]            See the Encounter Report to view Anticoagulation Flowsheet and Dosing Calendar (Go to Encounters tab in chart review, and find the  Anticoagulation Therapy Visit)    Patient INR is therapeutic.  Patient will continue to take 57.5 mg weekly dosing and follow up in 6 weeks or sooner for any problems or concerns.        Renetta Shell RN

## 2020-01-14 DIAGNOSIS — Z79.01 LONG TERM CURRENT USE OF ANTICOAGULANT THERAPY: ICD-10-CM

## 2020-01-14 DIAGNOSIS — I82.4Y2 DEEP VEIN THROMBOSIS (DVT) OF PROXIMAL VEIN OF LEFT LOWER EXTREMITY, UNSPECIFIED CHRONICITY (H): ICD-10-CM

## 2020-01-15 RX ORDER — WARFARIN SODIUM 5 MG/1
TABLET ORAL
Qty: 90 TABLET | Refills: 1 | Status: SHIPPED | OUTPATIENT
Start: 2020-01-15 | End: 2020-05-27

## 2020-01-15 NOTE — TELEPHONE ENCOUNTER
"Last Written Prescription Date:  10/1/19  Last Fill Quantity: 90 tablets,  # refills: 1   Last office visit: 11/26/2019 with prescribing provider:  Torsten   Future Office Visit:      Requested Prescriptions   Pending Prescriptions Disp Refills     JANTOVEN ANTICOAGULANT 5 MG tablet 90 tablet 1     Sig: TAKE 10 MG TUESDAY, SATURDAY AND 7.5 MG ALL OTHER DAYS AS DIRECTED BY INR CLINIC.       Vitamin K Antagonists Failed - 1/14/2020  6:27 PM        Failed - INR is within goal in the past 6 weeks     Confirm INR is within goal in the past 6 weeks.     Recent Labs   Lab Test 01/08/20   INR 2.8*                       Passed - Recent (12 mo) or future (30 days) visit within the authorizing provider's specialty     Patient has had an office visit with the authorizing provider or a provider within the authorizing providers department within the previous 12 mos or has a future within next 30 days. See \"Patient Info\" tab in inbasket, or \"Choose Columns\" in Meds & Orders section of the refill encounter.              Passed - Medication is active on med list        Passed - Patient is 18 years of age or older          "

## 2020-02-19 ENCOUNTER — ANTICOAGULATION THERAPY VISIT (OUTPATIENT)
Dept: NURSING | Facility: CLINIC | Age: 55
End: 2020-02-19
Payer: COMMERCIAL

## 2020-02-19 DIAGNOSIS — Z79.01 LONG TERM CURRENT USE OF ANTICOAGULANT THERAPY: ICD-10-CM

## 2020-02-19 LAB — INR POINT OF CARE: 2.7 (ref 0.86–1.14)

## 2020-02-19 PROCEDURE — 99207 ZZC NO CHARGE NURSE ONLY: CPT

## 2020-02-19 PROCEDURE — 85610 PROTHROMBIN TIME: CPT | Mod: QW

## 2020-02-19 PROCEDURE — 36416 COLLJ CAPILLARY BLOOD SPEC: CPT

## 2020-02-19 NOTE — PROGRESS NOTES
ANTICOAGULATION FOLLOW-UP CLINIC VISIT    Patient Name:  Barry Herrera  Date:  2020  Contact Type:  Face to Face    SUBJECTIVE:  Patient Findings     Comments:   The patient was assessed for diet, medication, and activity level changes, missed or extra doses, bruising or bleeding, with no problem findings.          Clinical Outcomes     Negatives:   Major bleeding event, Thromboembolic event, Anticoagulation-related hospital admission, Anticoagulation-related ED visit, Anticoagulation-related fatality    Comments:   The patient was assessed for diet, medication, and activity level changes, missed or extra doses, bruising or bleeding, with no problem findings.             OBJECTIVE    INR Protime   Date Value Ref Range Status   2020 2.7 (A) 0.86 - 1.14 Final       ASSESSMENT / PLAN  INR assessment THER    Recheck INR In: 6 WEEKS    INR Location Clinic      Anticoagulation Summary  As of 2020    INR goal:   2.0-3.0   TTR:   83.6 % (12 mo)   INR used for dosin.7 (2020)   Warfarin maintenance plan:   10 mg (5 mg x 2) every Tue, Sat; 7.5 mg (5 mg x 1.5) all other days   Full warfarin instructions:   10 mg every Tue, Sat; 7.5 mg all other days   Weekly warfarin total:   57.5 mg   No change documented:   Renetta Shell RN   Plan last modified:   Carline Reynoso RN (2019)   Next INR check:   2020   Priority:   Maintenance   Target end date:       Indications    Long-term (current) use of anticoagulants [Z79.01] [Z79.01]  DVT (deep venous thrombosis) (H) (Resolved) [I82.409]             Anticoagulation Episode Summary     INR check location:   Anticoagulation Clinic    Preferred lab:       Send INR reminders to:   [facility]    Comments:         Anticoagulation Care Providers     Provider Role Specialty Phone number    Josette Sarmiento MD  [facility] [phone]            See the Encounter Report to view Anticoagulation Flowsheet and Dosing Calendar (Go to  Encounters tab in chart review, and find the Anticoagulation Therapy Visit)    Patient INR is therapeutic.  Patient will continue to take 57.5 mg weekly dosing and follow up in 6 weeks or sooner for any problems or concerns.        Renetta Shell RN

## 2020-03-30 DIAGNOSIS — Z79.01 LONG TERM CURRENT USE OF ANTICOAGULANT THERAPY: Primary | ICD-10-CM

## 2020-03-30 DIAGNOSIS — Z86.718 HISTORY OF DEEP VENOUS THROMBOSIS: ICD-10-CM

## 2020-04-01 ENCOUNTER — ANTICOAGULATION THERAPY VISIT (OUTPATIENT)
Dept: FAMILY MEDICINE | Facility: CLINIC | Age: 55
End: 2020-04-01

## 2020-04-01 DIAGNOSIS — Z86.718 HISTORY OF DEEP VENOUS THROMBOSIS: ICD-10-CM

## 2020-04-01 DIAGNOSIS — Z79.01 LONG TERM CURRENT USE OF ANTICOAGULANT THERAPY: ICD-10-CM

## 2020-04-01 LAB
CAPILLARY BLOOD COLLECTION: NORMAL
INR BLD: 2.4 (ref 0.86–1.14)

## 2020-04-01 PROCEDURE — 36416 COLLJ CAPILLARY BLOOD SPEC: CPT | Performed by: FAMILY MEDICINE

## 2020-04-01 PROCEDURE — 99207 ZZC NO CHARGE NURSE ONLY: CPT | Performed by: FAMILY MEDICINE

## 2020-04-01 PROCEDURE — 85610 PROTHROMBIN TIME: CPT | Mod: QW | Performed by: FAMILY MEDICINE

## 2020-04-01 NOTE — PROGRESS NOTES
ANTICOAGULATION FOLLOW-UP CLINIC VISIT    Patient Name:  Barry Herrera  Date:  2020  Contact Type:  Telephone/ Barry    SUBJECTIVE:  Patient Findings     Comments:   The patient was assessed for diet, medication, and activity level changes, missed or extra doses, bruising or bleeding, with no problem findings.          Clinical Outcomes     Negatives:   Major bleeding event, Thromboembolic event, Anticoagulation-related hospital admission, Anticoagulation-related ED visit, Anticoagulation-related fatality    Comments:   The patient was assessed for diet, medication, and activity level changes, missed or extra doses, bruising or bleeding, with no problem findings.             OBJECTIVE    INR Point of Care   Date Value Ref Range Status   2020 2.4 (H) 0.86 - 1.14 Final     Comment:     This test is intended for monitoring Coumadin therapy.  Results are not   accurate in patients with prolonged INR due to factor deficiency.         ASSESSMENT / PLAN  INR assessment THER    Recheck INR In: 6 WEEKS    INR Location Clinic      Anticoagulation Summary  As of 2020    INR goal:   2.0-3.0   TTR:   83.6 % (12 mo)   INR used for dosin.4 (2020)   Warfarin maintenance plan:   10 mg (5 mg x 2) every Tue, Sat; 7.5 mg (5 mg x 1.5) all other days   Full warfarin instructions:   10 mg every Tue, Sat; 7.5 mg all other days   Weekly warfarin total:   57.5 mg   No change documented:   Renetta Shell RN   Plan last modified:   Carline Reynoso RN (2019)   Next INR check:   2020   Priority:   Maintenance   Target end date:       Indications    Long-term (current) use of anticoagulants [Z79.01] [Z79.01]  DVT (deep venous thrombosis) (H) (Resolved) [I82.409]             Anticoagulation Episode Summary     INR check location:   Anticoagulation Clinic    Preferred lab:       Send INR reminders to:   [facility]    Comments:         Anticoagulation Care Providers     Provider Role  Specialty Phone number    Josette Sarmiento MD  Family Practice [phone]            See the Encounter Report to view Anticoagulation Flowsheet and Dosing Calendar (Go to Encounters tab in chart review, and find the Anticoagulation Therapy Visit)    Patient INR is therapeutic.  Patient will continue to take 57.5 mg weekly dosing and follow up in 6 weeks or sooner for any problems or concerns.        Renetta Shell RN

## 2020-05-13 ENCOUNTER — ANTICOAGULATION THERAPY VISIT (OUTPATIENT)
Dept: FAMILY MEDICINE | Facility: CLINIC | Age: 55
End: 2020-05-13

## 2020-05-13 DIAGNOSIS — Z79.01 LONG TERM CURRENT USE OF ANTICOAGULANT THERAPY: ICD-10-CM

## 2020-05-13 DIAGNOSIS — Z86.718 HISTORY OF DEEP VENOUS THROMBOSIS: ICD-10-CM

## 2020-05-13 DIAGNOSIS — Z86.718 HISTORY OF DEEP VENOUS THROMBOSIS: Primary | ICD-10-CM

## 2020-05-13 LAB
CAPILLARY BLOOD COLLECTION: NORMAL
INR BLD: 2.6 (ref 0.86–1.14)

## 2020-05-13 PROCEDURE — 99207 ZZC NO CHARGE NURSE ONLY: CPT | Performed by: FAMILY MEDICINE

## 2020-05-13 PROCEDURE — 36416 COLLJ CAPILLARY BLOOD SPEC: CPT | Performed by: FAMILY MEDICINE

## 2020-05-13 PROCEDURE — 85610 PROTHROMBIN TIME: CPT | Mod: QW | Performed by: FAMILY MEDICINE

## 2020-05-13 RX ORDER — WARFARIN SODIUM 5 MG/1
TABLET ORAL
Qty: 140 TABLET | Refills: 1 | Status: SHIPPED | OUTPATIENT
Start: 2020-05-13 | End: 2021-09-01

## 2020-05-13 NOTE — PROGRESS NOTES
ANTICOAGULATION FOLLOW-UP CLINIC VISIT    Patient Name:  Barry Herrera  Date:  5/13/2020  Contact Type:  Telephone/ spoke with the patient    SUBJECTIVE:  Patient Findings         Clinical Outcomes     Negatives:   Major bleeding event, Thromboembolic event, Anticoagulation-related hospital admission, Anticoagulation-related ED visit, Anticoagulation-related fatality           OBJECTIVE    Recent labs: (last 7 days)     05/13/20  1151   INR 2.6*       ASSESSMENT / PLAN  INR assessment THER    Recheck INR In: 6 WEEKS    INR Location Outside lab      Anticoagulation Summary  As of 5/13/2020    INR goal:   2.0-3.0   TTR:   86.0 % (1 y)   INR used for dosing:   No new INR was available at the time of this encounter.   Warfarin maintenance plan:   10 mg (5 mg x 2) every Tue, Sat; 7.5 mg (5 mg x 1.5) all other days   Full warfarin instructions:   10 mg every Tue, Sat; 7.5 mg all other days   Weekly warfarin total:   57.5 mg   No change documented:   Carline Reynoso RN   Plan last modified:   Carline Reynoso RN (4/30/2019)   Next INR check:   6/24/2020   Priority:   Maintenance   Target end date:       Indications    Long-term (current) use of anticoagulants [Z79.01] [Z79.01]  DVT (deep venous thrombosis) (H) (Resolved) [I82.409]             Anticoagulation Episode Summary     INR check location:   Anticoagulation Clinic    Preferred lab:       Send INR reminders to:   [facility]    Comments:         Anticoagulation Care Providers     Provider Role Specialty Phone number    Josette Sarmiento MD  [facility] [phone]            See the Encounter Report to view Anticoagulation Flowsheet and Dosing Calendar (Go to Encounters tab in chart review, and find the Anticoagulation Therapy Visit)    INR is therapeutic today at 2.6. Patient to continue weekly warfarin maintenance dose of 57.5 mg. Recheck INR in 6 weeks or sooner if any changes to health, medications, diet, activity or upcoming invasive  procedure.       Carline Reynoso RN

## 2020-05-27 DIAGNOSIS — I82.4Y2 DEEP VEIN THROMBOSIS (DVT) OF PROXIMAL VEIN OF LEFT LOWER EXTREMITY, UNSPECIFIED CHRONICITY (H): ICD-10-CM

## 2020-05-27 DIAGNOSIS — Z79.01 LONG TERM CURRENT USE OF ANTICOAGULANT THERAPY: ICD-10-CM

## 2020-05-27 RX ORDER — WARFARIN SODIUM 5 MG/1
TABLET ORAL
Qty: 90 TABLET | Refills: 1 | Status: SHIPPED | OUTPATIENT
Start: 2020-05-27 | End: 2020-06-24

## 2020-05-27 NOTE — TELEPHONE ENCOUNTER
Prescription approved per [facility] Refill Protocol.  Ronda Shell RN   [facility] - Triage

## 2020-05-27 NOTE — TELEPHONE ENCOUNTER
DORIAN ANTICOAGULANT 5 MG tablet  90 tablet  1  1/15/2020     Last Written Prescription Date:  01/15/2020  Last Fill Quantity: 90 tablet,  # refills: 1   Last office visit: 11/26/2019 with prescribing provider:  Torsten   Future Office Visit:  Unknown           Suture Removal: 14 days

## 2020-05-29 NOTE — PROGRESS NOTES
[facility]  Gastroenterology Progress Note     Barry Herrera MRN# [id number]   YOB: 1965 Age: 53 year old          Assessment and Plan:     Severe obstructive sleep apnea    History of deep venous thrombosis    Abdominal pain, epigastric    Epigastric pain    Pancreatitis  Doing well no significant pain patient is postop lap joan day 1 Intra-Op cholangiogram was normal pancreatic labs are improved liver function tests are improving.  I will recommend the patient to be started on low-fat full liquid diet today and advance as tolerated if patient is doing well patient can be discharged home today from GI standpoint I will recommend follow-up visit in GI clinic in about 2 weeks.  Finding and plan was discussed in detail with patient and his family.  All of questions were answered.            Chest pain  Gallstone pancreatitis  Acute post-operative pain      Interval History:   doing well; no cp, sob, n/v/d, or abd pain.              Review of Systems:   C: NEGATIVE for fever, chills, change in weight  E/M: NEGATIVE for ear, mouth and throat problems  R: NEGATIVE for significant cough or SOB  CV: NEGATIVE for chest pain, palpitations or peripheral edema             Medications:   I have reviewed this patient's current medications    sodium chloride (PF)  3 mL Intracatheter Q8H                  Physical Exam:   Vitals were reviewed  Vital Signs with Ranges  Temp:  [96.3  F (35.7  C)-100.1  F (37.8  C)] 96.3  F (35.7  C)  Pulse:  [65-79] 68  Heart Rate:  [61-78] 61  Resp:  [15-19] 16  BP: (128-157)/(67-85) 128/67  SpO2:  [94 %-100 %] 99 %  I/O last 3 completed shifts:  In: 3042 [I.V.:3042]  Out: 1150 [Urine:1150]  Constitutional: healthy, alert and no distress   Cardiovascular: negative, PMI normal. No lifts, heaves, or thrills. RRR. No murmurs, clicks gallops or rub  Respiratory: negative, Percussion normal. Good diaphragmatic excursion. Lungs clear  Head: Normocephalic. No masses,  lesions, tenderness or abnormalities  Neck: Neck supple. No adenopathy. Thyroid symmetric, normal size,, Carotids without bruits.  Abdomen: Abdomen soft, non-tender. BS normal. No masses, organomegaly  SKIN: no suspicious lesions or rashes           Data:   I reviewed the patient's new clinical lab test results.   Recent Labs   Lab Test 04/11/19  0802 04/10/19  0652 04/09/19  0821 04/08/19  2130   WBC 8.4 5.3  --  7.0   HGB 12.7* 12.5*  --  13.5   MCV 90 91  --  89   * 112*  --  151   INR 1.18* 1.65* 2.05* 2.03*     Recent Labs   Lab Test 04/11/19  0802 04/10/19  0652 04/08/19  2130   POTASSIUM 4.0 4.4 4.2   CHLORIDE 112* 112* 106   CO2 26 28 30   BUN 5* 6* 20   ANIONGAP 5 2* 4     Recent Labs   Lab Test 04/11/19  0802 04/10/19  0652 04/08/19  2130 07/22/15  1235 02/25/13  1543   ALBUMIN 3.3* 3.3* 4.1  --   --    BILITOTAL 0.9 0.6 0.7  --   --    * 210* 163*  --   --    AST 53* 86* 241*  --   --    PROTEIN  --   --   --  Negative Negative   LIPASE 241 2,645* 32,283*  --   --        I reviewed the patient's new imaging results.    All laboratory data reviewed  All imaging studies reviewed by me.    Clint Real MD,  4/11/2019  [facility]  Office : [phone]  Cell: [phone]   No

## 2020-06-24 ENCOUNTER — ANTICOAGULATION THERAPY VISIT (OUTPATIENT)
Dept: FAMILY MEDICINE | Facility: CLINIC | Age: 55
End: 2020-06-24

## 2020-06-24 DIAGNOSIS — Z86.718 HISTORY OF DEEP VENOUS THROMBOSIS: ICD-10-CM

## 2020-06-24 DIAGNOSIS — I82.4Y2 DEEP VEIN THROMBOSIS (DVT) OF PROXIMAL VEIN OF LEFT LOWER EXTREMITY, UNSPECIFIED CHRONICITY (H): ICD-10-CM

## 2020-06-24 DIAGNOSIS — Z79.01 LONG TERM CURRENT USE OF ANTICOAGULANT THERAPY: ICD-10-CM

## 2020-06-24 LAB
CAPILLARY BLOOD COLLECTION: NORMAL
INR BLD: 2.7 (ref 0.86–1.14)

## 2020-06-24 PROCEDURE — 85610 PROTHROMBIN TIME: CPT | Mod: QW | Performed by: FAMILY MEDICINE

## 2020-06-24 PROCEDURE — 99207 ZZC NO CHARGE NURSE ONLY: CPT | Performed by: FAMILY MEDICINE

## 2020-06-24 PROCEDURE — 36416 COLLJ CAPILLARY BLOOD SPEC: CPT | Performed by: FAMILY MEDICINE

## 2020-06-24 RX ORDER — WARFARIN SODIUM 5 MG/1
TABLET ORAL
Qty: 140 TABLET | Refills: 1 | Status: SHIPPED | OUTPATIENT
Start: 2020-06-24 | End: 2020-12-09

## 2020-06-24 NOTE — PROGRESS NOTES
ANTICOAGULATION FOLLOW-UP CLINIC VISIT    Patient Name:  Barry Herrera  Date:  2020  Contact Type:  Telephone/ Abdulaziz    SUBJECTIVE:  Patient Findings     Comments:   The patient was assessed for diet, medication, and activity level changes, missed or extra doses, bruising or bleeding, with no problem findings.          Clinical Outcomes     Negatives:   Major bleeding event, Thromboembolic event, Anticoagulation-related hospital admission, Anticoagulation-related ED visit, Anticoagulation-related fatality    Comments:   The patient was assessed for diet, medication, and activity level changes, missed or extra doses, bruising or bleeding, with no problem findings.             OBJECTIVE    Recent labs: (last 7 days)     20  1150   INR 2.7*       ASSESSMENT / PLAN  INR assessment THER    Recheck INR In: 6 WEEKS    INR Location Clinic      Anticoagulation Summary  As of 2020    INR goal:   2.0-3.0   TTR:   86.0 % (1 y)   INR used for dosin.7 (2020)   Warfarin maintenance plan:   10 mg (5 mg x 2) every Tue, Sat; 7.5 mg (5 mg x 1.5) all other days   Full warfarin instructions:   10 mg every Tue, Sat; 7.5 mg all other days   Weekly warfarin total:   57.5 mg   No change documented:   Renetta Shell RN   Plan last modified:   Carline Reynoso RN (2019)   Next INR check:   2020   Priority:   Maintenance   Target end date:       Indications    Long-term (current) use of anticoagulants [Z79.01] [Z79.01]  DVT (deep venous thrombosis) (H) (Resolved) [I82.409]             Anticoagulation Episode Summary     INR check location:   Anticoagulation Clinic    Preferred lab:       Send INR reminders to:   [facility]    Comments:         Anticoagulation Care Providers     Provider Role Specialty Phone number    Josette Sarmiento MD  [facility] [phone]            See the Encounter Report to view Anticoagulation Flowsheet and Dosing Calendar (Go to Encounters tab in chart  review, and find the Anticoagulation Therapy Visit)    Patient INR is therapeutic.  Patient will continue to take 57.5 mg weekly dosing and follow up in 6 weeks or sooner for any problems or concerns.        Renetta Shell RN

## 2020-08-05 ENCOUNTER — ANTICOAGULATION THERAPY VISIT (OUTPATIENT)
Dept: FAMILY MEDICINE | Facility: CLINIC | Age: 55
End: 2020-08-05

## 2020-08-05 DIAGNOSIS — Z79.01 LONG TERM CURRENT USE OF ANTICOAGULANT THERAPY: ICD-10-CM

## 2020-08-05 DIAGNOSIS — Z86.718 HISTORY OF DEEP VENOUS THROMBOSIS: ICD-10-CM

## 2020-08-05 LAB
CAPILLARY BLOOD COLLECTION: NORMAL
INR BLD: 2.6 (ref 0.86–1.14)

## 2020-08-05 PROCEDURE — 99207 ZZC NO CHARGE NURSE ONLY: CPT | Performed by: FAMILY MEDICINE

## 2020-08-05 PROCEDURE — 36416 COLLJ CAPILLARY BLOOD SPEC: CPT | Performed by: FAMILY MEDICINE

## 2020-08-05 PROCEDURE — 85610 PROTHROMBIN TIME: CPT | Mod: QW | Performed by: FAMILY MEDICINE

## 2020-08-05 NOTE — PROGRESS NOTES
ANTICOAGULATION FOLLOW-UP CLINIC VISIT    Patient Name:  Barry Herrera  Date:  2020  Contact Type:  Telephone/ Abdulaziz    SUBJECTIVE:  Patient Findings     Comments:   The patient was assessed for diet, medication, and activity level changes, missed or extra doses, bruising or bleeding, with no problem findings.          Clinical Outcomes     Negatives:   Major bleeding event, Thromboembolic event, Anticoagulation-related hospital admission, Anticoagulation-related ED visit, Anticoagulation-related fatality    Comments:   The patient was assessed for diet, medication, and activity level changes, missed or extra doses, bruising or bleeding, with no problem findings.             OBJECTIVE    Recent labs: (last 7 days)     20  1152   INR 2.6*       ASSESSMENT / PLAN  INR assessment THER    Recheck INR In: 6 WEEKS    INR Location Clinic      Anticoagulation Summary  As of 2020    INR goal:   2.0-3.0   TTR:   86.0 % (1 y)   INR used for dosin.6 (2020)   Warfarin maintenance plan:   10 mg (5 mg x 2) every Tue, Sat; 7.5 mg (5 mg x 1.5) all other days   Full warfarin instructions:   10 mg every Tue, Sat; 7.5 mg all other days   Weekly warfarin total:   57.5 mg   No change documented:   Renetta Shell RN   Plan last modified:   Carline Reynoso RN (2019)   Next INR check:   2020   Priority:   Maintenance   Target end date:       Indications    Long-term (current) use of anticoagulants [Z79.01] [Z79.01]  DVT (deep venous thrombosis) (H) (Resolved) [I82.409]             Anticoagulation Episode Summary     INR check location:   Anticoagulation Clinic    Preferred lab:       Send INR reminders to:   [facility]    Comments:         Anticoagulation Care Providers     Provider Role Specialty Phone number    Josette Sarmiento MD  Family [facility] [phone]            See the Encounter Report to view Anticoagulation Flowsheet and Dosing Calendar (Go to Encounters tab in chart  review, and find the Anticoagulation Therapy Visit)    Patient INR is therapeutic.  Patient will continue to take 57.5 mg weekly dosing and follow up in 6 weeks or sooner for any problems or concerns.        Renetta Shell RN

## 2020-09-16 ENCOUNTER — TELEPHONE (OUTPATIENT)
Dept: FAMILY MEDICINE | Facility: CLINIC | Age: 55
End: 2020-09-16

## 2020-09-16 ENCOUNTER — ANTICOAGULATION THERAPY VISIT (OUTPATIENT)
Dept: NURSING | Facility: CLINIC | Age: 55
End: 2020-09-16
Payer: COMMERCIAL

## 2020-09-16 DIAGNOSIS — I82.4Y2 DEEP VEIN THROMBOSIS (DVT) OF PROXIMAL VEIN OF LEFT LOWER EXTREMITY, UNSPECIFIED CHRONICITY (H): ICD-10-CM

## 2020-09-16 DIAGNOSIS — Z86.718 HISTORY OF DEEP VENOUS THROMBOSIS: ICD-10-CM

## 2020-09-16 DIAGNOSIS — Z79.01 LONG TERM CURRENT USE OF ANTICOAGULANTS WITH INR GOAL OF 2.0-3.0: Primary | ICD-10-CM

## 2020-09-16 DIAGNOSIS — Z79.01 LONG TERM CURRENT USE OF ANTICOAGULANT THERAPY: ICD-10-CM

## 2020-09-16 LAB
CAPILLARY BLOOD COLLECTION: NORMAL
INR PPP: 3 (ref 0.86–1.14)

## 2020-09-16 PROCEDURE — 85610 PROTHROMBIN TIME: CPT | Performed by: FAMILY MEDICINE

## 2020-09-16 PROCEDURE — 99207 ZZC NO CHARGE NURSE ONLY: CPT

## 2020-09-16 PROCEDURE — 36416 COLLJ CAPILLARY BLOOD SPEC: CPT | Performed by: FAMILY MEDICINE

## 2020-09-16 NOTE — TELEPHONE ENCOUNTER
ANTICOAGULATION MANAGEMENT      Barry Herrera due for annual renewal of referral to anticoagulation monitoring. Order pended for your review and signature.      ANTICOAGULATION SUMMARY      Warfarin indication(s)     DVT    Heart valve present?  NO       Current goal range   INR: 2.0-3.0     Goal appropriate for indication? Yes, INR 2-3 appropriate for hx of DVT, PE, hypercoagulable state, Afib, LVAD, or bileaflet AVR without risk factors     Current duration of therapy Indefinite/long term therapy   Time in Therapeutic Range (TTR)  (Goal > 60%) 86.0 %       Office visit with referring provider's group within last year yes on 11/26/19       Debbie Rodriguez RN

## 2020-09-16 NOTE — PROGRESS NOTES
ANTICOAGULATION FOLLOW-UP CLINIC VISIT    Patient Name:  Barry Herrera  Date:  9/16/2020  Contact Type:  Telephone    SUBJECTIVE:  Patient Findings     Comments:   Called patient to discuss today's INR results: The patient was assessed for diet, medication, and activity level changes, missed or extra doses, bruising or bleeding, with no problem findings. Reviewed maintenance warfarin dosing with patient. Patient will remain on the same dose until next INR check. He plans to return to regular green veggie intake. No other questions or concerns. Scheduled next lab-only INR in 6 weeks.  Referral sent to PCP for INR Renewal.  Debbie MCNAIR RN  Anticoagulation Clinic  [facility]          Clinical Outcomes     Negatives:   Major bleeding event, Thromboembolic event, Anticoagulation-related hospital admission, Anticoagulation-related ED visit, Anticoagulation-related fatality    Comments:   Called patient to discuss today's INR results: The patient was assessed for diet, medication, and activity level changes, missed or extra doses, bruising or bleeding, with no problem findings. Reviewed maintenance warfarin dosing with patient. Patient will remain on the same dose until next INR check. He plans to return to regular green veggie intake. No other questions or concerns. Scheduled next lab-only INR in 6 weeks.  Referral sent to PCP for INR Renewal.  Debbie MCNAIR RN  Anticoagulation Clinic  [facility]             OBJECTIVE    Recent labs: (last 7 days)     09/16/20  1155   INR 3.00*       ASSESSMENT / PLAN  INR assessment THER    Recheck INR In: 6 WEEKS    INR Location Clinic      Anticoagulation Summary  As of 9/16/2020    INR goal:   2.0-3.0   TTR:   94.0 % (1 y)   INR used for dosing:   3.00 (9/16/2020)   Warfarin maintenance plan:   10 mg (5 mg x 2) every Tue, Sat; 7.5 mg (5 mg x 1.5) all other days   Full warfarin instructions:   10 mg every Tue, Sat; 7.5 mg all other days   Weekly warfarin total:   57.5 mg   No change  documented:   Debbie Rodriguez RN   Plan last modified:   Carline Reynoso RN (4/30/2019)   Next INR check:   10/28/2020   Priority:   Maintenance   Target end date:       Indications    Long-term (current) use of anticoagulants [Z79.01] [Z79.01]  DVT (deep venous thrombosis) (H) (Resolved) [I82.409]             Anticoagulation Episode Summary     INR check location:   Anticoagulation Clinic    Preferred lab:       Send INR reminders to:   [facility]    Comments:         Anticoagulation Care Providers     Provider Role Specialty Phone number    Josette Sarmiento MD Referring [facility] [phone]            See the Encounter Report to view Anticoagulation Flowsheet and Dosing Calendar (Go to Encounters tab in chart review, and find the Anticoagulation Therapy Visit)    Debbie Rodriguez, RN

## 2020-10-28 ENCOUNTER — ANTICOAGULATION THERAPY VISIT (OUTPATIENT)
Dept: NURSING | Facility: CLINIC | Age: 55
End: 2020-10-28

## 2020-10-28 DIAGNOSIS — I82.4Y2 DEEP VEIN THROMBOSIS (DVT) OF PROXIMAL VEIN OF LEFT LOWER EXTREMITY, UNSPECIFIED CHRONICITY (H): ICD-10-CM

## 2020-10-28 DIAGNOSIS — Z79.01 LONG TERM CURRENT USE OF ANTICOAGULANT THERAPY: ICD-10-CM

## 2020-10-28 DIAGNOSIS — Z79.01 LONG TERM CURRENT USE OF ANTICOAGULANTS WITH INR GOAL OF 2.0-3.0: ICD-10-CM

## 2020-10-28 DIAGNOSIS — Z86.718 HISTORY OF DEEP VENOUS THROMBOSIS: ICD-10-CM

## 2020-10-28 LAB
CAPILLARY BLOOD COLLECTION: NORMAL
INR PPP: 3 (ref 0.86–1.14)

## 2020-10-28 PROCEDURE — 85610 PROTHROMBIN TIME: CPT | Performed by: FAMILY MEDICINE

## 2020-10-28 PROCEDURE — 36416 COLLJ CAPILLARY BLOOD SPEC: CPT | Performed by: FAMILY MEDICINE

## 2020-10-28 PROCEDURE — 99207 PR NO CHARGE NURSE ONLY: CPT

## 2020-10-28 NOTE — PROGRESS NOTES
Anticoagulation Management    Unable to reach Abdulaziz today.    Today's INR result of 3.0 is therapeutic (goal INR of 2.0-3.0).  Result received from: Clinic Lab    Follow up required to confirm warfarin dose taken and assess for changes    Pt to call [facility] INR at [phone]; if no answer then call [facility] at [phone]. Left message to continue current dose of warfarin 7.5 mg tonight.    Anticoagulation clinic to follow up    Debbie Rodriguez RN    ANTICOAGULATION FOLLOW-UP CLINIC VISIT    Patient Name:  Barry Herrera  Date:  10/28/2020  Contact Type:  Telephone    SUBJECTIVE:  Patient Findings     Comments:  Patient returned call. The patient was assessed for diet, medication, and activity level changes, missed or extra doses, bruising or bleeding, with no problem findings. He has still been a little light on the green veggies but plans to include more going forward. Reviewed maintenance warfarin dosing with patient. Patient will remain on the same dose until next INR check. No other questions or concerns. Scheduled next lab-only INR in 6 weeks.  Debbie MCNAIR RN  Anticoagulation Clinic  [facility]          Clinical Outcomes     Negatives:  Major bleeding event, Thromboembolic event, Anticoagulation-related hospital admission, Anticoagulation-related ED visit, Anticoagulation-related fatality    Comments:  Patient returned call. The patient was assessed for diet, medication, and activity level changes, missed or extra doses, bruising or bleeding, with no problem findings. He has still been a little light on the green veggies but plans to include more going forward. Reviewed maintenance warfarin dosing with patient. Patient will remain on the same dose until next INR check. No other questions or concerns. Scheduled next lab-only INR in 6 weeks.  Debbie MCNAIR RN  Anticoagulation Clinic  [facility]             OBJECTIVE    Recent labs: (last 7 days)     10/28/20  1154   INR 3.00*       ASSESSMENT / PLAN  INR  assessment THER    Recheck INR In: 6 WEEKS    INR Location Clinic      Anticoagulation Summary  As of 10/28/2020    INR goal:  2.0-3.0   TTR:  95.8 % (1 y)   INR used for dosing:  3.00 (10/28/2020)   Warfarin maintenance plan:  10 mg (5 mg x 2) every Tue, Sat; 7.5 mg (5 mg x 1.5) all other days   Full warfarin instructions:  10 mg every Tue, Sat; 7.5 mg all other days   Weekly warfarin total:  57.5 mg   No change documented:  Debbie Rodriguez RN   Plan last modified:  Carline Reynoso RN (4/30/2019)   Next INR check:  12/9/2020   Priority:  Maintenance   Target end date:  Indefinite    Indications    Long-term (current) use of anticoagulants [Z79.01] [Z79.01]  DVT (deep venous thrombosis) (H) (Resolved) [I82.409]  Long term current use of anticoagulants with INR goal of 2.0-3.0 [Z79.01]  Deep vein thrombosis (DVT) of proximal vein of left lower extremity  unspecified chronicity (H) [I82.4Y2]             Anticoagulation Episode Summary     INR check location:  Anticoagulation Clinic    Preferred lab:      Send INR reminders to:  [facility]    Comments:        Anticoagulation Care Providers     Provider Role Specialty Phone number    Josette Sarmiento MD Referring Family Medicine [phone]            See the Encounter Report to view Anticoagulation Flowsheet and Dosing Calendar (Go to Encounters tab in chart review, and find the Anticoagulation Therapy Visit)    Debbie Rodriguez, COTY

## 2020-11-12 ENCOUNTER — ALLIED HEALTH/NURSE VISIT (OUTPATIENT)
Dept: NURSING | Facility: CLINIC | Age: 55
End: 2020-11-12
Payer: COMMERCIAL

## 2020-11-12 DIAGNOSIS — Z23 NEED FOR PROPHYLACTIC VACCINATION AND INOCULATION AGAINST INFLUENZA: Primary | ICD-10-CM

## 2020-11-12 PROCEDURE — 90471 IMMUNIZATION ADMIN: CPT

## 2020-11-12 PROCEDURE — 99207 PR NO CHARGE NURSE ONLY: CPT

## 2020-11-12 PROCEDURE — 90682 RIV4 VACC RECOMBINANT DNA IM: CPT

## 2020-12-08 DIAGNOSIS — I82.4Y2 DEEP VEIN THROMBOSIS (DVT) OF PROXIMAL VEIN OF LEFT LOWER EXTREMITY, UNSPECIFIED CHRONICITY (H): ICD-10-CM

## 2020-12-08 DIAGNOSIS — Z79.01 LONG TERM CURRENT USE OF ANTICOAGULANT THERAPY: ICD-10-CM

## 2020-12-09 ENCOUNTER — ANTICOAGULATION THERAPY VISIT (OUTPATIENT)
Dept: FAMILY MEDICINE | Facility: CLINIC | Age: 55
End: 2020-12-09

## 2020-12-09 DIAGNOSIS — Z79.01 LONG TERM CURRENT USE OF ANTICOAGULANT THERAPY: ICD-10-CM

## 2020-12-09 DIAGNOSIS — Z79.01 LONG TERM CURRENT USE OF ANTICOAGULANTS WITH INR GOAL OF 2.0-3.0: ICD-10-CM

## 2020-12-09 DIAGNOSIS — Z86.718 HISTORY OF DEEP VENOUS THROMBOSIS: ICD-10-CM

## 2020-12-09 DIAGNOSIS — I82.4Y2 DEEP VEIN THROMBOSIS (DVT) OF PROXIMAL VEIN OF LEFT LOWER EXTREMITY, UNSPECIFIED CHRONICITY (H): ICD-10-CM

## 2020-12-09 LAB
CAPILLARY BLOOD COLLECTION: NORMAL
INR PPP: 2.6 (ref 0.86–1.14)

## 2020-12-09 PROCEDURE — 85610 PROTHROMBIN TIME: CPT | Performed by: FAMILY MEDICINE

## 2020-12-09 PROCEDURE — 99207 PR NO CHARGE NURSE ONLY: CPT | Performed by: FAMILY MEDICINE

## 2020-12-09 PROCEDURE — 36416 COLLJ CAPILLARY BLOOD SPEC: CPT | Performed by: FAMILY MEDICINE

## 2020-12-09 RX ORDER — WARFARIN SODIUM 5 MG/1
TABLET ORAL
Qty: 140 TABLET | Refills: 1 | Status: SHIPPED | OUTPATIENT
Start: 2020-12-09 | End: 2021-06-01

## 2020-12-09 NOTE — PROGRESS NOTES
ANTICOAGULATION FOLLOW-UP CLINIC VISIT    Patient Name:  Barry Herrera  Date:  2020  Contact Type:  Telephone/ Barry    SUBJECTIVE:  Patient Findings     Comments:  The patient was assessed for diet, medication, and activity level changes, missed or extra doses, bruising or bleeding, with no problem findings.          Clinical Outcomes     Negatives:  Major bleeding event, Thromboembolic event, Anticoagulation-related hospital admission, Anticoagulation-related ED visit, Anticoagulation-related fatality    Comments:  The patient was assessed for diet, medication, and activity level changes, missed or extra doses, bruising or bleeding, with no problem findings.             OBJECTIVE    Recent labs: (last 7 days)     20  1146   INR 2.60*       ASSESSMENT / PLAN  INR assessment THER    Recheck INR In: 6 WEEKS    INR Location Clinic      Anticoagulation Summary  As of 2020    INR goal:  2.0-3.0   TTR:  100.0 % (1 y)   INR used for dosin.60 (2020)   Warfarin maintenance plan:  10 mg (5 mg x 2) every Tue, Sat; 7.5 mg (5 mg x 1.5) all other days   Full warfarin instructions:  10 mg every Tue, Sat; 7.5 mg all other days   Weekly warfarin total:  57.5 mg   No change documented:  Renetta Shell RN   Plan last modified:  Carline Reynoso RN (2019)   Next INR check:  2021   Priority:  Maintenance   Target end date:  Indefinite    Indications    Long-term (current) use of anticoagulants [Z79.01] [Z79.01]  DVT (deep venous thrombosis) (H) (Resolved) [I82.409]  Long term current use of anticoagulants with INR goal of 2.0-3.0 [Z79.01]  Deep vein thrombosis (DVT) of proximal vein of left lower extremity  unspecified chronicity (H) [I82.4Y2]             Anticoagulation Episode Summary     INR check location:  Anticoagulation Clinic    Preferred lab:      Send INR reminders to:  [facility]    Comments:        Anticoagulation Care Providers     Provider Role Specialty Phone  number    Josette Sarmiento MD Referring Family Medicine [phone]            See the Encounter Report to view Anticoagulation Flowsheet and Dosing Calendar (Go to Encounters tab in chart review, and find the Anticoagulation Therapy Visit)    Patient INR is therapeutic.  Patient will continue to take 57.5 mg weekly dosing and follow up in 6 weeks or sooner for any problems or concerns.        Renetta Shell RN

## 2020-12-09 NOTE — TELEPHONE ENCOUNTER
Prescription approved per [facility] Refill Protocol.  Ronda Shell RN   [facility]  [place], [place], [place]

## 2021-01-15 ENCOUNTER — HEALTH MAINTENANCE LETTER (OUTPATIENT)
Age: 56
End: 2021-01-15

## 2021-01-20 ENCOUNTER — ANTICOAGULATION THERAPY VISIT (OUTPATIENT)
Dept: FAMILY MEDICINE | Facility: CLINIC | Age: 56
End: 2021-01-20

## 2021-01-20 DIAGNOSIS — Z79.01 LONG TERM CURRENT USE OF ANTICOAGULANTS WITH INR GOAL OF 2.0-3.0: ICD-10-CM

## 2021-01-20 DIAGNOSIS — Z79.01 LONG TERM CURRENT USE OF ANTICOAGULANT THERAPY: ICD-10-CM

## 2021-01-20 DIAGNOSIS — Z86.718 HISTORY OF DEEP VENOUS THROMBOSIS: ICD-10-CM

## 2021-01-20 DIAGNOSIS — I82.4Y2 DEEP VEIN THROMBOSIS (DVT) OF PROXIMAL VEIN OF LEFT LOWER EXTREMITY, UNSPECIFIED CHRONICITY (H): ICD-10-CM

## 2021-01-20 LAB
CAPILLARY BLOOD COLLECTION: NORMAL
INR PPP: 2.4 (ref 0.86–1.14)

## 2021-01-20 PROCEDURE — 99207 PR NO CHARGE NURSE ONLY: CPT | Performed by: FAMILY MEDICINE

## 2021-01-20 PROCEDURE — 85610 PROTHROMBIN TIME: CPT | Performed by: FAMILY MEDICINE

## 2021-01-20 PROCEDURE — 36416 COLLJ CAPILLARY BLOOD SPEC: CPT | Performed by: FAMILY MEDICINE

## 2021-01-20 NOTE — PROGRESS NOTES
ANTICOAGULATION FOLLOW-UP CLINIC VISIT    Patient Name:  Barry Herrera  Date:  2021  Contact Type:  Telephone/ Barry    SUBJECTIVE:  Patient Findings     Comments:  The patient was assessed for diet, medication, and activity level changes, missed or extra doses, bruising or bleeding, with no problem findings.          Clinical Outcomes     Negatives:  Major bleeding event, Thromboembolic event, Anticoagulation-related hospital admission, Anticoagulation-related ED visit, Anticoagulation-related fatality    Comments:  The patient was assessed for diet, medication, and activity level changes, missed or extra doses, bruising or bleeding, with no problem findings.             OBJECTIVE    Recent labs: (last 7 days)     21  1146   INR 2.40*       ASSESSMENT / PLAN  INR assessment THER    Recheck INR In: 6 WEEKS    INR Location Clinic      Anticoagulation Summary  As of 2021    INR goal:  2.0-3.0   TTR:  100.0 % (1 y)   INR used for dosin.40 (2021)   Warfarin maintenance plan:  10 mg (5 mg x 2) every Tue, Sat; 7.5 mg (5 mg x 1.5) all other days   Full warfarin instructions:  10 mg every Tue, Sat; 7.5 mg all other days   Weekly warfarin total:  57.5 mg   No change documented:  Renetta Shell RN   Plan last modified:  Carline Reynoso RN (2019)   Next INR check:  3/3/2021   Priority:  Maintenance   Target end date:  Indefinite    Indications    Long-term (current) use of anticoagulants [Z79.01] [Z79.01]  DVT (deep venous thrombosis) (H) (Resolved) [I82.409]  Long term current use of anticoagulants with INR goal of 2.0-3.0 [Z79.01]  Deep vein thrombosis (DVT) of proximal vein of left lower extremity  unspecified chronicity (H) [I82.4Y2]             Anticoagulation Episode Summary     INR check location:  Anticoagulation Clinic    Preferred lab:      Send INR reminders to:  [facility]    Comments:        Anticoagulation Care Providers     Provider Role Specialty Phone  number    Josette Sarmiento MD Referring Family Medicine [phone]            See the Encounter Report to view Anticoagulation Flowsheet and Dosing Calendar (Go to Encounters tab in chart review, and find the Anticoagulation Therapy Visit)    Patient INR is therapeutic.  Patient will continue to take 7.5 mg weekly dosing and follow up in 6 weeks or sooner for any problems or concerns.        Renetta Shell RN

## 2021-03-03 ENCOUNTER — ANTICOAGULATION THERAPY VISIT (OUTPATIENT)
Dept: FAMILY MEDICINE | Facility: CLINIC | Age: 56
End: 2021-03-03

## 2021-03-03 DIAGNOSIS — Z79.01 LONG TERM CURRENT USE OF ANTICOAGULANT THERAPY: ICD-10-CM

## 2021-03-03 DIAGNOSIS — Z86.718 HISTORY OF DEEP VENOUS THROMBOSIS: ICD-10-CM

## 2021-03-03 DIAGNOSIS — I82.4Y2 DEEP VEIN THROMBOSIS (DVT) OF PROXIMAL VEIN OF LEFT LOWER EXTREMITY, UNSPECIFIED CHRONICITY (H): ICD-10-CM

## 2021-03-03 DIAGNOSIS — Z79.01 LONG TERM CURRENT USE OF ANTICOAGULANTS WITH INR GOAL OF 2.0-3.0: ICD-10-CM

## 2021-03-03 LAB
CAPILLARY BLOOD COLLECTION: NORMAL
INR PPP: 2.8 (ref 0.86–1.14)

## 2021-03-03 PROCEDURE — 36416 COLLJ CAPILLARY BLOOD SPEC: CPT | Performed by: FAMILY MEDICINE

## 2021-03-03 PROCEDURE — 99207 PR NO CHARGE NURSE ONLY: CPT | Performed by: FAMILY MEDICINE

## 2021-03-03 PROCEDURE — 85610 PROTHROMBIN TIME: CPT | Performed by: FAMILY MEDICINE

## 2021-03-03 NOTE — PROGRESS NOTES
ANTICOAGULATION FOLLOW-UP CLINIC VISIT    Patient Name:  Barry Herrera  Date:  3/3/2021  Contact Type:  Telephone/ Abdulaziz    SUBJECTIVE:  Patient Findings     Comments:  The patient was assessed for diet, medication, and activity level changes, missed or extra doses, bruising or bleeding, with no problem findings.          Clinical Outcomes     Negatives:  Major bleeding event, Thromboembolic event, Anticoagulation-related hospital admission, Anticoagulation-related ED visit, Anticoagulation-related fatality    Comments:  The patient was assessed for diet, medication, and activity level changes, missed or extra doses, bruising or bleeding, with no problem findings.             OBJECTIVE    Recent labs: (last 7 days)     21  1207   INR 2.80*       ASSESSMENT / PLAN  INR assessment THER    Recheck INR In: 6 WEEKS    INR Location Clinic      Anticoagulation Summary  As of 3/3/2021    INR goal:  2.0-3.0   TTR:  100.0 % (1 y)   INR used for dosin.80 (3/3/2021)   Warfarin maintenance plan:  10 mg (5 mg x 2) every Tue, Sat; 7.5 mg (5 mg x 1.5) all other days   Full warfarin instructions:  10 mg every Tue, Sat; 7.5 mg all other days   Weekly warfarin total:  57.5 mg   No change documented:  Renetta Shell RN   Plan last modified:  Carline Reynoso RN (2019)   Next INR check:  2021   Priority:  Maintenance   Target end date:  Indefinite    Indications    Long-term (current) use of anticoagulants [Z79.01] [Z79.01]  DVT (deep venous thrombosis) (H) (Resolved) [I82.409]  Long term current use of anticoagulants with INR goal of 2.0-3.0 [Z79.01]  Deep vein thrombosis (DVT) of proximal vein of left lower extremity  unspecified chronicity (H) [I82.4Y2]             Anticoagulation Episode Summary     INR check location:  Anticoagulation Clinic    Preferred lab:      Send INR reminders to:  [facility]    Comments:        Anticoagulation Care Providers     Provider Role Specialty Phone number     Josette Sarmiento MD Referring Family Medicine [phone]            See the Encounter Report to view Anticoagulation Flowsheet and Dosing Calendar (Go to Encounters tab in chart review, and find the Anticoagulation Therapy Visit)    Patient INR is therapeutic.  Patient will continue to take 57.5 mg weekly dosing and follow up in 6 weeks or sooner for any problems or concerns.        Renetta Shell RN

## 2021-03-30 DIAGNOSIS — Z79.01 LONG TERM CURRENT USE OF ANTICOAGULANTS WITH INR GOAL OF 2.0-3.0: Primary | ICD-10-CM

## 2021-03-30 DIAGNOSIS — I82.4Y2 DEEP VEIN THROMBOSIS (DVT) OF PROXIMAL VEIN OF LEFT LOWER EXTREMITY, UNSPECIFIED CHRONICITY (H): ICD-10-CM

## 2021-03-30 NOTE — PROGRESS NOTES
Standing INR order placed.     Ronda Shell, RN   [facility]  [place], [place], [place]

## 2021-04-14 ENCOUNTER — ANTICOAGULATION THERAPY VISIT (OUTPATIENT)
Dept: FAMILY MEDICINE | Facility: CLINIC | Age: 56
End: 2021-04-14

## 2021-04-14 DIAGNOSIS — I82.4Y2 DEEP VEIN THROMBOSIS (DVT) OF PROXIMAL VEIN OF LEFT LOWER EXTREMITY, UNSPECIFIED CHRONICITY (H): ICD-10-CM

## 2021-04-14 DIAGNOSIS — Z79.01 LONG TERM CURRENT USE OF ANTICOAGULANTS WITH INR GOAL OF 2.0-3.0: ICD-10-CM

## 2021-04-14 DIAGNOSIS — Z79.01 LONG TERM CURRENT USE OF ANTICOAGULANT THERAPY: ICD-10-CM

## 2021-04-14 LAB
CAPILLARY BLOOD COLLECTION: NORMAL
INR PPP: 2.2 (ref 0.86–1.14)

## 2021-04-14 PROCEDURE — 36416 COLLJ CAPILLARY BLOOD SPEC: CPT | Performed by: FAMILY MEDICINE

## 2021-04-14 PROCEDURE — 85610 PROTHROMBIN TIME: CPT | Performed by: FAMILY MEDICINE

## 2021-04-14 PROCEDURE — 99207 PR NO CHARGE NURSE ONLY: CPT | Performed by: FAMILY MEDICINE

## 2021-04-14 NOTE — PROGRESS NOTES
ANTICOAGULATION FOLLOW-UP CLINIC VISIT    Patient Name:  Barry Herrera  Date:  2021  Contact Type:  Telephone/ Abdulaziz    SUBJECTIVE:  Patient Findings     Comments:  The patient was assessed for diet, medication, and activity level changes, missed or extra doses, bruising or bleeding, with no problem findings.          Clinical Outcomes     Negatives:  Major bleeding event, Thromboembolic event, Anticoagulation-related hospital admission, Anticoagulation-related ED visit, Anticoagulation-related fatality    Comments:  The patient was assessed for diet, medication, and activity level changes, missed or extra doses, bruising or bleeding, with no problem findings.             OBJECTIVE    Recent labs: (last 7 days)     21  1147   INR 2.20*       ASSESSMENT / PLAN  INR assessment THER    Recheck INR In: 6 WEEKS    INR Location Clinic      Anticoagulation Summary  As of 2021    INR goal:  2.0-3.0   TTR:  100.0 % (1 y)   INR used for dosin.20 (2021)   Warfarin maintenance plan:  10 mg (5 mg x 2) every Tue, Sat; 7.5 mg (5 mg x 1.5) all other days   Full warfarin instructions:  10 mg every Tue, Sat; 7.5 mg all other days   Weekly warfarin total:  57.5 mg   No change documented:  Renetta Shell RN   Plan last modified:  Carline Reynoso RN (2019)   Next INR check:  2021   Priority:  Maintenance   Target end date:  Indefinite    Indications    Long-term (current) use of anticoagulants [Z79.01] [Z79.01]  DVT (deep venous thrombosis) (H) (Resolved) [I82.409]  Long term current use of anticoagulants with INR goal of 2.0-3.0 [Z79.01]  Deep vein thrombosis (DVT) of proximal vein of left lower extremity  unspecified chronicity (H) [I82.4Y2]             Anticoagulation Episode Summary     INR check location:  Anticoagulation Clinic    Preferred lab:      Send INR reminders to:  [facility]    Comments:        Anticoagulation Care Providers     Provider Role Specialty Phone  number    Josette Sarmiento MD Referring Family Medicine [phone]            See the Encounter Report to view Anticoagulation Flowsheet and Dosing Calendar (Go to Encounters tab in chart review, and find the Anticoagulation Therapy Visit)    Patient INR is therapeutic.  Patient will continue to take 57.5 mg weekly dosing and follow up in 6 weeks or sooner for any problems or concerns.        Renetta Shell RN

## 2021-05-26 ENCOUNTER — ANTICOAGULATION THERAPY VISIT (OUTPATIENT)
Dept: FAMILY MEDICINE | Facility: CLINIC | Age: 56
End: 2021-05-26

## 2021-05-26 DIAGNOSIS — Z79.01 LONG TERM CURRENT USE OF ANTICOAGULANTS WITH INR GOAL OF 2.0-3.0: ICD-10-CM

## 2021-05-26 DIAGNOSIS — I82.4Y2 DEEP VEIN THROMBOSIS (DVT) OF PROXIMAL VEIN OF LEFT LOWER EXTREMITY, UNSPECIFIED CHRONICITY (H): ICD-10-CM

## 2021-05-26 DIAGNOSIS — Z79.01 LONG TERM CURRENT USE OF ANTICOAGULANT THERAPY: ICD-10-CM

## 2021-05-26 LAB
CAPILLARY BLOOD COLLECTION: NORMAL
INR PPP: 2.9 (ref 0.86–1.14)

## 2021-05-26 PROCEDURE — 36416 COLLJ CAPILLARY BLOOD SPEC: CPT | Performed by: FAMILY MEDICINE

## 2021-05-26 PROCEDURE — 99207 PR NO CHARGE NURSE ONLY: CPT | Performed by: FAMILY MEDICINE

## 2021-05-26 PROCEDURE — 85610 PROTHROMBIN TIME: CPT | Performed by: FAMILY MEDICINE

## 2021-05-26 NOTE — PROGRESS NOTES
ANTICOAGULATION FOLLOW-UP CLINIC VISIT    Patient Name:  Barry Herrera  Date:  2021  Contact Type:  Telephone/ Abdulaziz    SUBJECTIVE:  Patient Findings     Comments:  The patient was assessed for diet, medication, and activity level changes, missed or extra doses, bruising or bleeding, with no problem findings.          Clinical Outcomes     Negatives:  Major bleeding event, Thromboembolic event, Anticoagulation-related hospital admission, Anticoagulation-related ED visit, Anticoagulation-related fatality    Comments:  The patient was assessed for diet, medication, and activity level changes, missed or extra doses, bruising or bleeding, with no problem findings.             OBJECTIVE    Recent labs: (last 7 days)     21  1145   INR 2.90*       ASSESSMENT / PLAN  INR assessment THER    Recheck INR In: 6 WEEKS    INR Location Clinic      Anticoagulation Summary  As of 2021    INR goal:  2.0-3.0   TTR:  100.0 % (1 y)   INR used for dosin.90 (2021)   Warfarin maintenance plan:  10 mg (5 mg x 2) every Tue, Sat; 7.5 mg (5 mg x 1.5) all other days   Full warfarin instructions:  10 mg every Tue, Sat; 7.5 mg all other days   Weekly warfarin total:  57.5 mg   No change documented:  Renetta Shell RN   Plan last modified:  Carline Reynoso RN (2019)   Next INR check:  2021   Priority:  Maintenance   Target end date:  Indefinite    Indications    Long-term (current) use of anticoagulants [Z79.01] [Z79.01]  DVT (deep venous thrombosis) (H) (Resolved) [I82.409]  Long term current use of anticoagulants with INR goal of 2.0-3.0 [Z79.01]  Deep vein thrombosis (DVT) of proximal vein of left lower extremity  unspecified chronicity (H) [I82.4Y2]             Anticoagulation Episode Summary     INR check location:  Anticoagulation Clinic    Preferred lab:      Send INR reminders to:  [facility]    Comments:        Anticoagulation Care Providers     Provider Role Specialty Phone  number    Josette Sarmiento MD Referring Family Medicine [phone]            See the Encounter Report to view Anticoagulation Flowsheet and Dosing Calendar (Go to Encounters tab in chart review, and find the Anticoagulation Therapy Visit)    Patient INR is therapeutic.  Patient will continue to take 57.5 mg weekly dosing and follow up in 6 weeks or sooner for any problems or concerns.        Renetta Shell RN

## 2021-06-01 DIAGNOSIS — Z79.01 LONG TERM CURRENT USE OF ANTICOAGULANT THERAPY: ICD-10-CM

## 2021-06-01 DIAGNOSIS — I82.4Y2 DEEP VEIN THROMBOSIS (DVT) OF PROXIMAL VEIN OF LEFT LOWER EXTREMITY, UNSPECIFIED CHRONICITY (H): ICD-10-CM

## 2021-06-01 RX ORDER — WARFARIN SODIUM 5 MG/1
TABLET ORAL
Qty: 140 TABLET | Refills: 0 | Status: SHIPPED | OUTPATIENT
Start: 2021-06-01 | End: 2021-09-01

## 2021-06-01 NOTE — TELEPHONE ENCOUNTER
Prescription approved per [facility] Refill Protocol.  Ronda Shell RN   [facility]  [place], [place], [place]

## 2021-07-07 ENCOUNTER — ANTICOAGULATION THERAPY VISIT (OUTPATIENT)
Dept: FAMILY MEDICINE | Facility: CLINIC | Age: 56
End: 2021-07-07

## 2021-07-07 DIAGNOSIS — I82.4Y2 DEEP VEIN THROMBOSIS (DVT) OF PROXIMAL VEIN OF LEFT LOWER EXTREMITY, UNSPECIFIED CHRONICITY (H): ICD-10-CM

## 2021-07-07 DIAGNOSIS — Z79.01 LONG TERM CURRENT USE OF ANTICOAGULANTS WITH INR GOAL OF 2.0-3.0: ICD-10-CM

## 2021-07-07 DIAGNOSIS — Z79.01 LONG TERM CURRENT USE OF ANTICOAGULANT THERAPY: ICD-10-CM

## 2021-07-07 LAB
CAPILLARY BLOOD COLLECTION: NORMAL
INR PPP: 3.2 (ref 0.86–1.14)

## 2021-07-07 PROCEDURE — 36416 COLLJ CAPILLARY BLOOD SPEC: CPT | Performed by: FAMILY MEDICINE

## 2021-07-07 PROCEDURE — 85610 PROTHROMBIN TIME: CPT | Performed by: FAMILY MEDICINE

## 2021-07-07 NOTE — PROGRESS NOTES
ANTICOAGULATION MANAGEMENT     Barry Herrera 55 year old male is on warfarin with supratherapeutic INR result. (Goal INR 2.0-3.0)    Recent labs: (last 7 days)     07/07/21  1149   INR 3.20*       ASSESSMENT     Source(s): Patient/Caregiver Call       Warfarin doses taken: Warfarin taken as instructed    Diet: Decreased greens/vitamin K in diet; plans to resume previous intake and Change in alcohol intake may be affecting INR. More than normal over the holida weekend.     New illness, injury, or hospitalization: No    Medication/supplement changes: None noted    Signs or symptoms of bleeding or clotting: No    Previous INR: Therapeutic last 2(+) visits    Additional findings: None     PLAN     Recommended plan for temporary change(s) affecting INR     Dosing Instructions: Partial hold then continue your current warfarin dose with next INR in 2 weeks       Summary  As of 7/7/2021    Full warfarin instructions:  7/7: 5 mg; Otherwise 10 mg every Tue, Sat; 7.5 mg all other days   Next INR check:  7/21/2021             Telephone call with Barry who verbalizes understanding and agrees to plan    Lab visit scheduled    Education provided: Importance of consistent vitamin K intake, Potential interaction between warfarin and alcohol and Monitoring for bleeding signs and symptoms    Plan made per [facility] anticoagulation protocol    Renetta Shell RN  Anticoagulation Clinic  7/7/2021    _______________________________________________________________________     Anticoagulation Episode Summary     Current INR goal:  2.0-3.0   TTR:  92.3 % (1 y)   Target end date:  Indefinite   Send INR reminders to:  [facility]    Indications    Long-term (current) use of anticoagulants [Z79.01] [Z79.01]  DVT (deep venous thrombosis) (H) (Resolved) [I82.409]  Long term current use of anticoagulants with INR goal of 2.0-3.0 [Z79.01]  Deep vein thrombosis (DVT) of proximal vein of left lower extremity  unspecified chronicity  (H) [I82.4Y2]           Comments:           Anticoagulation Care Providers     Provider Role Specialty Phone number    Josette Sarmiento MD Referring Family Medicine [phone]

## 2021-07-21 ENCOUNTER — LAB (OUTPATIENT)
Dept: LAB | Facility: CLINIC | Age: 56
End: 2021-07-21
Payer: COMMERCIAL

## 2021-07-21 ENCOUNTER — ANTICOAGULATION THERAPY VISIT (OUTPATIENT)
Dept: ANTICOAGULATION | Facility: CLINIC | Age: 56
End: 2021-07-21

## 2021-07-21 DIAGNOSIS — Z79.01 LONG TERM CURRENT USE OF ANTICOAGULANTS WITH INR GOAL OF 2.0-3.0: ICD-10-CM

## 2021-07-21 DIAGNOSIS — I82.4Y2 DEEP VEIN THROMBOSIS (DVT) OF PROXIMAL VEIN OF LEFT LOWER EXTREMITY, UNSPECIFIED CHRONICITY (H): ICD-10-CM

## 2021-07-21 LAB — INR BLD: 2.4 (ref 0.9–1.1)

## 2021-07-21 PROCEDURE — 36416 COLLJ CAPILLARY BLOOD SPEC: CPT

## 2021-07-21 PROCEDURE — 85610 PROTHROMBIN TIME: CPT

## 2021-07-21 NOTE — PROGRESS NOTES
ANTICOAGULATION MANAGEMENT     Barry Herrera 55 year old male is on warfarin with therapeutic INR result. (Goal INR 2.0-3.0)    Recent labs: (last 7 days)     07/21/21  1117   INR 2.4*       ASSESSMENT     Source(s): Patient/Caregiver Call       Warfarin doses taken: Warfarin taken as instructed    Diet: No new diet changes identified    New illness, injury, or hospitalization: No    Medication/supplement changes: None noted    Signs or symptoms of bleeding or clotting: No    Previous INR: Supratherapeutic    Additional findings: None     PLAN     Recommended plan for no diet, medication or health factor changes affecting INR     Dosing Instructions: Continue your current warfarin dose with next INR in 6 weeks       Summary  As of 7/21/2021    Full warfarin instructions:  10 mg every Tue, Sat; 7.5 mg all other days   Next INR check:               Telephone call with Barry who verbalizes understanding and agrees to plan    Lab visit scheduled    Education provided: Please call back if any changes to your diet, medications or how you've been taking warfarin, Monitoring for bleeding signs and symptoms, Monitoring for clotting signs and symptoms and Importance of notifying clinic for changes in medications; a sooner lab recheck maybe needed.    Plan made per [facility] anticoagulation protocol    Krystal Lira RN  Anticoagulation Clinic  7/21/2021    _______________________________________________________________________     Anticoagulation Episode Summary     Current INR goal:  2.0-3.0   TTR:  91.4 % (1 y)   Target end date:  Indefinite   Send INR reminders to:  [facility]    Indications    Long-term (current) use of anticoagulants [Z79.01] [Z79.01]  DVT (deep venous thrombosis) (H) (Resolved) [I82.409]  Long term current use of anticoagulants with INR goal of 2.0-3.0 [Z79.01]  Deep vein thrombosis (DVT) of proximal vein of left lower extremity  unspecified chronicity (H) [I82.4Y2]           Comments:            Anticoagulation Care Providers     Provider Role Specialty Phone number    Josette Sarmiento MD Referring Family Medicine [phone]

## 2021-08-09 ENCOUNTER — LAB (OUTPATIENT)
Dept: LAB | Facility: CLINIC | Age: 56
End: 2021-08-09
Payer: COMMERCIAL

## 2021-08-09 ENCOUNTER — ANTICOAGULATION THERAPY VISIT (OUTPATIENT)
Dept: ANTICOAGULATION | Facility: CLINIC | Age: 56
End: 2021-08-09

## 2021-08-09 ENCOUNTER — TELEPHONE (OUTPATIENT)
Dept: FAMILY MEDICINE | Facility: CLINIC | Age: 56
End: 2021-08-09

## 2021-08-09 DIAGNOSIS — I82.4Y2 DEEP VEIN THROMBOSIS (DVT) OF PROXIMAL VEIN OF LEFT LOWER EXTREMITY, UNSPECIFIED CHRONICITY (H): ICD-10-CM

## 2021-08-09 DIAGNOSIS — Z79.01 LONG TERM CURRENT USE OF ANTICOAGULANTS WITH INR GOAL OF 2.0-3.0: ICD-10-CM

## 2021-08-09 DIAGNOSIS — Z79.01 LONG TERM CURRENT USE OF ANTICOAGULANT THERAPY: Primary | ICD-10-CM

## 2021-08-09 DIAGNOSIS — Z79.01 LONG TERM CURRENT USE OF ANTICOAGULANTS WITH INR GOAL OF 2.0-3.0: Primary | ICD-10-CM

## 2021-08-09 LAB — INR BLD: 2.4 (ref 0.9–1.1)

## 2021-08-09 PROCEDURE — 36416 COLLJ CAPILLARY BLOOD SPEC: CPT

## 2021-08-09 PROCEDURE — 85610 PROTHROMBIN TIME: CPT

## 2021-08-09 NOTE — PROGRESS NOTES
ANTICOAGULATION MANAGEMENT     Barry Herrera 55 year old male is on warfarin with therapeutic INR result. (Goal INR 2.0-3.0)    Recent labs: (last 7 days)     08/09/21  1045   INR 2.4*       ASSESSMENT     Source(s): Chart Review and Patient/Caregiver Call       Warfarin doses taken: Warfarin taken as instructed    Diet: No new diet changes identified    New illness, injury, or hospitalization: No    Medication/supplement changes: Medical cannibis started 2 weeks ago which has potential for interaction; increasing INR    Signs or symptoms of bleeding or clotting: No    Previous INR: Therapeutic last visit; previously outside of goal range    Additional findings: None     PLAN     Recommended plan for ongoing change(s) affecting INR     Dosing Instructions: Continue your current warfarin dose with next INR in 3 weeks       Summary  As of 8/9/2021    Full warfarin instructions:  10 mg every Tue, Sat; 7.5 mg all other days   Next INR check:  9/1/2021             Telephone call with Barry who verbalizes understanding and agrees to plan    Lab visit scheduled    Education provided: Please call back if any changes to your diet, medications or how you've been taking warfarin, Potential interaction between warfarin and medical cannibis, Monitoring for bleeding signs and symptoms and Monitoring for clotting signs and symptoms    Plan made per [facility] anticoagulation protocol    Debbie Rodriguez RN  Anticoagulation Clinic  8/9/2021    _______________________________________________________________________     Anticoagulation Episode Summary     Current INR goal:  2.0-3.0   TTR:  91.4 % (1 y)   Target end date:  Indefinite   Send INR reminders to:  [facility]    Indications    Long-term (current) use of anticoagulants [Z79.01] [Z79.01]  DVT (deep venous thrombosis) (H) (Resolved) [I82.409]  Long term current use of anticoagulants with INR goal of 2.0-3.0 [Z79.01]  Deep vein thrombosis (DVT) of proximal vein of  left lower extremity  unspecified chronicity (H) [I82.4Y2]           Comments:           Anticoagulation Care Providers     Provider Role Specialty Phone number    Josette Sarmiento MD Referring Family Medicine [phone]

## 2021-08-09 NOTE — TELEPHONE ENCOUNTER
ANTICOAGULATION MANAGEMENT      Barry Herrera due for annual renewal of referral to anticoagulation monitoring. Order pended for your review and signature.      ANTICOAGULATION SUMMARY      Warfarin indication(s)     DVT    Heart valve present?  NO       Current goal range   INR: 2.0-3.0     Goal appropriate for indication? Yes, INR 2-3 appropriate for hx of DVT, PE, hypercoagulable state, Afib, LVAD, or bileaflet AVR without risk factors     Current duration of therapy Indefinite/long term therapy   Time in Therapeutic Range (TTR)  (Goal > 60%) 91.4%       Office visit with referring provider's group within last year NO; last office visit for routine general medical exam 11/26/19 with PCP       Debbie Rodriguez RN

## 2021-09-01 ENCOUNTER — OFFICE VISIT (OUTPATIENT)
Dept: FAMILY MEDICINE | Facility: CLINIC | Age: 56
End: 2021-09-01
Payer: COMMERCIAL

## 2021-09-01 ENCOUNTER — ANTICOAGULATION THERAPY VISIT (OUTPATIENT)
Dept: ANTICOAGULATION | Facility: CLINIC | Age: 56
End: 2021-09-01

## 2021-09-01 ENCOUNTER — LAB (OUTPATIENT)
Dept: LAB | Facility: CLINIC | Age: 56
End: 2021-09-01
Payer: COMMERCIAL

## 2021-09-01 VITALS
BODY MASS INDEX: 28.04 KG/M2 | DIASTOLIC BLOOD PRESSURE: 68 MMHG | OXYGEN SATURATION: 98 % | HEIGHT: 72 IN | RESPIRATION RATE: 16 BRPM | SYSTOLIC BLOOD PRESSURE: 110 MMHG | HEART RATE: 64 BPM | WEIGHT: 207 LBS | TEMPERATURE: 97.4 F

## 2021-09-01 DIAGNOSIS — Z79.01 LONG TERM CURRENT USE OF ANTICOAGULANT THERAPY: Primary | ICD-10-CM

## 2021-09-01 DIAGNOSIS — Z79.01 LONG TERM CURRENT USE OF ANTICOAGULANTS WITH INR GOAL OF 2.0-3.0: ICD-10-CM

## 2021-09-01 DIAGNOSIS — I82.4Y2 DEEP VEIN THROMBOSIS (DVT) OF PROXIMAL VEIN OF LEFT LOWER EXTREMITY, UNSPECIFIED CHRONICITY (H): ICD-10-CM

## 2021-09-01 DIAGNOSIS — Z12.5 SCREENING FOR PROSTATE CANCER: ICD-10-CM

## 2021-09-01 DIAGNOSIS — Z00.00 ROUTINE GENERAL MEDICAL EXAMINATION AT A HEALTH CARE FACILITY: Primary | ICD-10-CM

## 2021-09-01 DIAGNOSIS — Z79.01 LONG TERM CURRENT USE OF ANTICOAGULANT THERAPY: ICD-10-CM

## 2021-09-01 LAB
ERYTHROCYTE [DISTWIDTH] IN BLOOD BY AUTOMATED COUNT: 13.4 % (ref 10–15)
HCT VFR BLD AUTO: 39.5 % (ref 40–53)
HGB BLD-MCNC: 13.5 G/DL (ref 13.3–17.7)
INR BLD: 2.3 (ref 0.9–1.1)
MCH RBC QN AUTO: 31 PG (ref 26.5–33)
MCHC RBC AUTO-ENTMCNC: 34.2 G/DL (ref 31.5–36.5)
MCV RBC AUTO: 91 FL (ref 78–100)
PLATELET # BLD AUTO: 166 10E3/UL (ref 150–450)
RBC # BLD AUTO: 4.35 10E6/UL (ref 4.4–5.9)
WBC # BLD AUTO: 4.5 10E3/UL (ref 4–11)

## 2021-09-01 PROCEDURE — G0103 PSA SCREENING: HCPCS | Performed by: FAMILY MEDICINE

## 2021-09-01 PROCEDURE — 36415 COLL VENOUS BLD VENIPUNCTURE: CPT | Performed by: FAMILY MEDICINE

## 2021-09-01 PROCEDURE — 36416 COLLJ CAPILLARY BLOOD SPEC: CPT

## 2021-09-01 PROCEDURE — 85027 COMPLETE CBC AUTOMATED: CPT | Performed by: FAMILY MEDICINE

## 2021-09-01 PROCEDURE — 80061 LIPID PANEL: CPT | Performed by: FAMILY MEDICINE

## 2021-09-01 PROCEDURE — 80053 COMPREHEN METABOLIC PANEL: CPT | Performed by: FAMILY MEDICINE

## 2021-09-01 PROCEDURE — 99396 PREV VISIT EST AGE 40-64: CPT | Performed by: FAMILY MEDICINE

## 2021-09-01 PROCEDURE — 85610 PROTHROMBIN TIME: CPT

## 2021-09-01 RX ORDER — WARFARIN SODIUM 5 MG/1
TABLET ORAL
Qty: 140 TABLET | Refills: 0 | Status: SHIPPED | OUTPATIENT
Start: 2021-09-01 | End: 2021-11-22

## 2021-09-01 ASSESSMENT — ENCOUNTER SYMPTOMS
EYE PAIN: 0
HEARTBURN: 0
PARESTHESIAS: 0
PALPITATIONS: 0
SHORTNESS OF BREATH: 0
ABDOMINAL PAIN: 0
DYSURIA: 0
NAUSEA: 0
HEMATURIA: 0
DIZZINESS: 0
WEAKNESS: 0
NERVOUS/ANXIOUS: 0
FEVER: 0
CHILLS: 0
FREQUENCY: 0
JOINT SWELLING: 0
ARTHRALGIAS: 0
MYALGIAS: 0
CONSTIPATION: 0
HEADACHES: 0
COUGH: 0
SORE THROAT: 0
HEMATOCHEZIA: 0
DIARRHEA: 0

## 2021-09-01 ASSESSMENT — MIFFLIN-ST. JEOR: SCORE: 1806.95

## 2021-09-01 ASSESSMENT — PAIN SCALES - GENERAL: PAINLEVEL: NO PAIN (0)

## 2021-09-01 NOTE — TELEPHONE ENCOUNTER
Warfarin - Prescription approved per [facility] Refill Protocol.    Ronda Shell RN   [facility] Anticoagulation Clinic   [facility]

## 2021-09-01 NOTE — PROGRESS NOTES
Anticoagulation Management    Unable to reach Abdulaziz today.    Today's INR result of 2.3 is therapeutic (goal INR of 2.0-3.0).  Result received from: Clinic Lab    Follow up required to assess for changes     Left message to continue current dose of warfarin 7.5 mg tonight. Call [facility].      Anticoagulation clinic to follow up    Renetta Shell RN

## 2021-09-01 NOTE — PROGRESS NOTES
SUBJECTIVE:   CC: Barry Herrera is an 55 year old male who presents for preventative health visit.       Patient has been advised of split billing requirements and indicates understanding: Yes  Healthy Habits:     Getting at least 3 servings of Calcium per day:  NO    Bi-annual eye exam:  Yes    Dental care twice a year:  Yes    Sleep apnea or symptoms of sleep apnea:  Sleep apnea    Diet:  Regular (no restrictions)    Frequency of exercise:  2-3 days/week    Duration of exercise:  15-30 minutes    Taking medications regularly:  Yes    Barriers to taking medications:  None    Medication side effects:  Not applicable    PHQ-2 Total Score: 0    Additional concerns today:  No              Today's PHQ-2 Score:   PHQ-2 ( 1999 Pf[employer]) 9/1/2021   Q1: Little interest or pleasure in doing things 0   Q2: Feeling down, depressed or hopeless 0   PHQ-2 Score 0   Q1: Little interest or pleasure in doing things Not at all   Q2: Feeling down, depressed or hopeless Not at all   PHQ-2 Score 0       Abuse: Current or Past(Physical, Sexual or Emotional)- No  Do you feel safe in your environment? Yes        Social History     Tobacco Use     Smoking status: Never Smoker     Smokeless tobacco: Never Used   Substance Use Topics     Alcohol use: Yes     Comment: approx 2 drinks a month     If you drink alcohol do you typically have >3 drinks per day or >7 drinks per week? No    Alcohol Use 9/1/2021   Prescreen: >3 drinks/day or >7 drinks/week? No   Prescreen: >3 drinks/day or >7 drinks/week? -       Last PSA:   PSA   Date Value Ref Range Status   11/26/2019 1.36 0 - 4 ug/L Final     Comment:     Assay Method:  Chemiluminescence using Siemens Vista analyzer     Prostate Specific Antigen Screen   Date Value Ref Range Status   09/01/2021 1.58 0.00 - 4.00 ug/L Final       Reviewed orders with patient. Reviewed health maintenance and updated orders accordingly - Yes  Patient Active Problem List   Diagnosis     Diverticulosis of large  intestine     HORSESHOE KIDNEY     Premature ejaculation     CARDIOVASCULAR SCREENING; LDL GOAL LESS THAN 160     Health Care Home     Long-term (current) use of anticoagulants [Z79.01]     Deep vein thrombosis (DVT) of other vein of left lower extremity     CORAZON on CPAP     Acute chest pain     History of deep venous thrombosis     Abdominal pain, epigastric     Epigastric pain     Long term current use of anticoagulants with INR goal of 2.0-3.0     Deep vein thrombosis (DVT) of proximal vein of left lower extremity, unspecified chronicity (H)     Past Surgical History:   Procedure Laterality Date     BACK SURGERY      c6-c7 disectomy     C APPENDECTOMY  7/77     HC DUPLEX EXTREMITY VENOUS, LIMITED UNILATERAL  3/2009    left occlusive thrombus groin to calf involving superficial femoral vein, popliteal veins as well as proximal peroneal and post tibial calf veins     LAPAROSCOPIC CHOLECYSTECTOMY WITH CHOLANGIOGRAMS N/A 4/10/2019    Procedure: LAPAROSCOPIC CHOLECYSTECTOMY WITH CHOLANGIOGRAMS;  Surgeon: Ladarius Rizzo MD;  Location:  OR       Social History     Tobacco Use     Smoking status: Never Smoker     Smokeless tobacco: Never Used   Substance Use Topics     Alcohol use: Yes     Comment: approx 2 drinks a month     Family History   Problem Relation Age of Onset     Blood Disease Brother         Factor V Leiden neg     Cardiovascular Brother         two DVT     Breast Cancer Mother      Aneurysm Father         brain - stroke         Current Outpatient Medications   Medication Sig Dispense Refill     JANTOVEN ANTICOAGULANT 5 MG tablet TAKE 2 TABLETS BY MOUTH ON TUESDAYS AND SATURDAYS. TAKE 1 AND 1/2 (7.5 MG) ALL OTHER DAYS OR AS DIRECTED BY INR CLINIC. 140 tablet 0     medical cannabis (Patient's own supply)        Allergies   Allergen Reactions     No Known Drug Allergies        Reviewed and updated as needed this visit by clinical staff  Tobacco  Allergies  Meds  Problems  Med Hx  Surg Hx  Fam Hx   Soc Hx          Reviewed and updated as needed this visit by Provider  Tobacco    Problems  Med Hx               Review of Systems   Constitutional: Negative for chills and fever.   HENT: Negative for congestion, ear pain, hearing loss and sore throat.    Eyes: Negative for pain and visual disturbance.   Respiratory: Negative for cough and shortness of breath.    Cardiovascular: Negative for chest pain, palpitations and peripheral edema.   Gastrointestinal: Negative for abdominal pain, constipation, diarrhea, heartburn, hematochezia and nausea.   Genitourinary: Negative for dysuria, frequency, genital sores, hematuria and urgency.   Musculoskeletal: Negative for arthralgias, joint swelling and myalgias.   Skin: Negative for rash.   Neurological: Negative for dizziness, weakness, headaches and paresthesias.   Psychiatric/Behavioral: Negative for mood changes. The patient is not nervous/anxious.      CONSTITUTIONAL: NEGATIVE for fever, chills, change in weight  INTEGUMENTARY/SKIN: NEGATIVE for worrisome rashes, moles or lesions  EYES: NEGATIVE for vision changes or irritation  ENT: NEGATIVE for ear, mouth and throat problems  RESP: NEGATIVE for significant cough or SOB  CV: NEGATIVE for chest pain, palpitations or peripheral edema  GI: NEGATIVE for nausea, abdominal pain, heartburn, or change in bowel habits   male: negative for dysuria, hematuria, decreased urinary stream, erectile dysfunction, urethral discharge  MUSCULOSKELETAL: NEGATIVE for significant arthralgias or myalgia  NEURO: NEGATIVE for weakness, dizziness or paresthesias  PSYCHIATRIC: NEGATIVE for changes in mood or affect    OBJECTIVE:   /68   Pulse 64   Temp 97.4  F (36.3  C)   Resp 16   Ht 1.829 m (6')   Wt 93.9 kg (207 lb)   SpO2 98%   BMI 28.07 kg/m      Physical Exam  GENERAL: healthy, alert and no distress  EYES: Eyes grossly normal to inspection, PERRL and conjunctivae and sclerae normal  HENT: ear canals and TM's normal, nose  and mouth without ulcers or lesions  NECK: no adenopathy, no asymmetry, masses, or scars and thyroid normal to palpation  RESP: lungs clear to auscultation - no rales, rhonchi or wheezes  CV: regular rate and rhythm, normal S1 S2, no S3 or S4, no murmur, click or rub, no peripheral edema and peripheral pulses strong  ABDOMEN: soft, nontender, no hepatosplenomegaly, no masses and bowel sounds normal  MS: no gross musculoskeletal defects noted, no edema  SKIN: no suspicious lesions or rashes  NEURO: Normal strength and tone, mentation intact and speech normal  PSYCH: mentation appears normal, affect normal/bright        ASSESSMENT/PLAN:       ICD-10-CM    1. Routine general medical examination at a health care facility  Z00.00 CBC with platelets     Comprehensive metabolic panel     Lipid panel reflex to direct LDL Fasting     CBC with platelets     Comprehensive metabolic panel     Lipid panel reflex to direct LDL Fasting   2. Screening for prostate cancer  Z12.5 Prostate Specific Antigen Screen     Prostate Specific Antigen Screen       Patient has been advised of split billing requirements and indicates understanding:   COUNSELING:   Reviewed preventive health counseling, as reflected in patient instructions       Regular exercise       Healthy diet/nutrition    Estimated body mass index is 28.07 kg/m  as calculated from the following:    Height as of this encounter: 1.829 m (6').    Weight as of this encounter: 93.9 kg (207 lb).     Weight management plan: Discussed healthy diet and exercise guidelines    He reports that he has never smoked. He has never used smokeless tobacco.      Counseling Resources:  ATP IV Guidelines  Pooled Cohorts Equation Calculator  FRAX Risk Assessment  ICSI Preventive Guidelines  Dietary Guidelines for Americans, 2010  USDA's MyPlate  ASA Prophylaxis  Lung CA Screening    Josette Sarmiento MD  [facility]

## 2021-09-01 NOTE — PROGRESS NOTES
ANTICOAGULATION MANAGEMENT     Baryr Herrera 55 year old male is on warfarin with therapeutic INR result. (Goal INR 2.0-3.0)    Recent labs: (last 7 days)     09/01/21  1131   INR 2.3*       ASSESSMENT     Source(s): Chart Review and Patient/Caregiver Call       Warfarin doses taken: Warfarin taken as instructed    Diet: No new diet changes identified    New illness, injury, or hospitalization: No    Medication/supplement changes: None noted    Signs or symptoms of bleeding or clotting: No    Previous INR: Therapeutic last 2(+) visits    Additional findings: None     PLAN     Recommended plan for no diet, medication or health factor changes affecting INR     Dosing Instructions: Continue your current warfarin dose with next INR in 6 weeks       Summary  As of 9/1/2021    Full warfarin instructions:  10 mg every Tue, Sat; 7.5 mg all other days   Next INR check:  10/13/2021             Telephone call with Barry who verbalizes understanding and agrees to plan    Lab visit scheduled    Education provided: Please call back if any changes to your diet, medications or how you've been taking warfarin    Plan made per [facility] anticoagulation protocol    Renetta Shell RN  Anticoagulation Clinic  9/1/2021    _______________________________________________________________________     Anticoagulation Episode Summary     Current INR goal:  2.0-3.0   TTR:  91.4 % (1 y)   Target end date:  Indefinite   Send INR reminders to:  [facility]    Indications    Long-term (current) use of anticoagulants [Z79.01] [Z79.01]  DVT (deep venous thrombosis) (H) (Resolved) [I82.409]  Long term current use of anticoagulants with INR goal of 2.0-3.0 [Z79.01]  Deep vein thrombosis (DVT) of proximal vein of left lower extremity  unspecified chronicity (H) [I82.4Y2]           Comments:           Anticoagulation Care Providers     Provider Role Specialty Phone number    Josette Sarmiento MD Referring Family Medicine [phone]     bleeding minimally

## 2021-09-02 LAB
ALBUMIN SERPL-MCNC: 3.8 G/DL (ref 3.4–5)
ALP SERPL-CCNC: 51 U/L (ref 40–150)
ALT SERPL W P-5'-P-CCNC: 29 U/L (ref 0–70)
ANION GAP SERPL CALCULATED.3IONS-SCNC: 4 MMOL/L (ref 3–14)
AST SERPL W P-5'-P-CCNC: 21 U/L (ref 0–45)
BILIRUB SERPL-MCNC: 0.7 MG/DL (ref 0.2–1.3)
BUN SERPL-MCNC: 9 MG/DL (ref 7–30)
CALCIUM SERPL-MCNC: 8.6 MG/DL (ref 8.5–10.1)
CHLORIDE BLD-SCNC: 108 MMOL/L (ref 94–109)
CHOLEST SERPL-MCNC: 192 MG/DL
CO2 SERPL-SCNC: 25 MMOL/L (ref 20–32)
CREAT SERPL-MCNC: 0.93 MG/DL (ref 0.66–1.25)
FASTING STATUS PATIENT QL REPORTED: YES
GFR SERPL CREATININE-BSD FRML MDRD: >90 ML/MIN/1.73M2
GLUCOSE BLD-MCNC: 87 MG/DL (ref 70–99)
HDLC SERPL-MCNC: 62 MG/DL
LDLC SERPL CALC-MCNC: 121 MG/DL
NONHDLC SERPL-MCNC: 130 MG/DL
POTASSIUM BLD-SCNC: 4.2 MMOL/L (ref 3.4–5.3)
PROT SERPL-MCNC: 7.4 G/DL (ref 6.8–8.8)
PSA SERPL-MCNC: 1.58 UG/L (ref 0–4)
SODIUM SERPL-SCNC: 137 MMOL/L (ref 133–144)
TRIGL SERPL-MCNC: 44 MG/DL

## 2021-09-19 ENCOUNTER — HEALTH MAINTENANCE LETTER (OUTPATIENT)
Age: 56
End: 2021-09-19

## 2021-10-13 ENCOUNTER — LAB (OUTPATIENT)
Dept: LAB | Facility: CLINIC | Age: 56
End: 2021-10-13
Payer: COMMERCIAL

## 2021-10-13 ENCOUNTER — ANTICOAGULATION THERAPY VISIT (OUTPATIENT)
Dept: ANTICOAGULATION | Facility: CLINIC | Age: 56
End: 2021-10-13

## 2021-10-13 DIAGNOSIS — I82.4Y2 DEEP VEIN THROMBOSIS (DVT) OF PROXIMAL VEIN OF LEFT LOWER EXTREMITY, UNSPECIFIED CHRONICITY (H): ICD-10-CM

## 2021-10-13 DIAGNOSIS — Z79.01 LONG TERM CURRENT USE OF ANTICOAGULANT THERAPY: Primary | ICD-10-CM

## 2021-10-13 DIAGNOSIS — Z79.01 LONG TERM CURRENT USE OF ANTICOAGULANTS WITH INR GOAL OF 2.0-3.0: ICD-10-CM

## 2021-10-13 LAB — INR BLD: 2.2 (ref 0.9–1.1)

## 2021-10-13 PROCEDURE — 85610 PROTHROMBIN TIME: CPT

## 2021-10-13 PROCEDURE — 36416 COLLJ CAPILLARY BLOOD SPEC: CPT

## 2021-10-13 NOTE — PROGRESS NOTES
Anticoagulation Management    Unable to reach Abdulaziz today.    Today's INR result of 2.2 is therapeutic (goal INR of 2.0-3.0).  Result received from: Clinic Lab    Follow up required to assess for changes     Left message to continue current dose of warfarin 7.5 mg tonight. Call [facility].      Anticoagulation clinic to follow up    Renetta Shell RN

## 2021-10-14 NOTE — PROGRESS NOTES
ANTICOAGULATION MANAGEMENT     Barry Herrera 55 year old male is on warfarin with therapeutic INR result. (Goal INR 2.0-3.0)    Recent labs: (last 7 days)     10/13/21  1144   INR 2.2*       ASSESSMENT     Source(s): Chart Review and unable to assess      PLAN     Recommended plan for no diet, medication or health factor changes affecting INR     Dosing Instructions: Continue your current warfarin dose with next INR in 6 weeks       Summary  As of 10/13/2021    Full warfarin instructions:  10 mg every Tue, Sat; 7.5 mg all other days   Next INR check:  11/24/2021             Detailed voice message left for Barry with dosing instructions and follow up date.   Sent [employer] message with dosing and follow up instructions    Contact [phone]  to schedule and with any changes, questions or concerns.     Education provided: Please call back if any changes to your diet, medications or how you've been taking warfarin    Plan made per [facility] anticoagulation protocol    Renetta Shell RN  Anticoagulation Clinic  10/14/2021    _______________________________________________________________________     Anticoagulation Episode Summary     Current INR goal:  2.0-3.0   TTR:  91.4 % (1 y)   Target end date:  Indefinite   Send INR reminders to:  [facility]    Indications    Long-term (current) use of anticoagulants [Z79.01] [Z79.01]  Deep vein thrombosis (DVT) of proximal vein of left lower extremity  unspecified chronicity (H) [I82.4Y2]           Comments:           Anticoagulation Care Providers     Provider Role Specialty Phone number    Josette Sarmiento MD Referring Family Medicine [phone]

## 2021-11-21 DIAGNOSIS — Z79.01 LONG TERM CURRENT USE OF ANTICOAGULANT THERAPY: ICD-10-CM

## 2021-11-21 DIAGNOSIS — I82.4Y2 DEEP VEIN THROMBOSIS (DVT) OF PROXIMAL VEIN OF LEFT LOWER EXTREMITY, UNSPECIFIED CHRONICITY (H): ICD-10-CM

## 2021-11-22 RX ORDER — WARFARIN SODIUM 5 MG/1
TABLET ORAL
Qty: 140 TABLET | Refills: 0 | Status: SHIPPED | OUTPATIENT
Start: 2021-11-22 | End: 2022-02-16

## 2021-11-22 NOTE — TELEPHONE ENCOUNTER
Warfarin - Prescription approved per [facility] Refill Protocol.    Ronda Shell RN   [facility] Anticoagulation Clinic   [facility]

## 2021-11-24 ENCOUNTER — ANTICOAGULATION THERAPY VISIT (OUTPATIENT)
Dept: ANTICOAGULATION | Facility: CLINIC | Age: 56
End: 2021-11-24

## 2021-11-24 ENCOUNTER — LAB (OUTPATIENT)
Dept: LAB | Facility: CLINIC | Age: 56
End: 2021-11-24
Payer: COMMERCIAL

## 2021-11-24 DIAGNOSIS — Z79.01 LONG TERM CURRENT USE OF ANTICOAGULANT THERAPY: Primary | ICD-10-CM

## 2021-11-24 DIAGNOSIS — I82.4Y2 DEEP VEIN THROMBOSIS (DVT) OF PROXIMAL VEIN OF LEFT LOWER EXTREMITY, UNSPECIFIED CHRONICITY (H): ICD-10-CM

## 2021-11-24 DIAGNOSIS — Z79.01 LONG TERM CURRENT USE OF ANTICOAGULANTS WITH INR GOAL OF 2.0-3.0: ICD-10-CM

## 2021-11-24 LAB — INR BLD: 2.2 (ref 0.9–1.1)

## 2021-11-24 PROCEDURE — 36416 COLLJ CAPILLARY BLOOD SPEC: CPT

## 2021-11-24 PROCEDURE — 85610 PROTHROMBIN TIME: CPT

## 2021-11-26 ENCOUNTER — MYC MEDICAL ADVICE (OUTPATIENT)
Dept: ANTICOAGULATION | Facility: CLINIC | Age: 56
End: 2021-11-26
Payer: COMMERCIAL

## 2021-11-26 NOTE — PROGRESS NOTES
ANTICOAGULATION MANAGEMENT     Barry Herrera 56 year old male is on warfarin with therapeutic INR result. (Goal INR 2.0-3.0)    Recent labs: (last 7 days)     11/24/21  1157   INR 2.2*       ASSESSMENT     Source(s): Chart Review       Warfarin doses taken: Reviewed in chart    Diet: unable to assess    New illness, injury, or hospitalization: No    Medication/supplement changes: None noted    Signs or symptoms of bleeding or clotting: No    Previous INR: Therapeutic last 2(+) visits    Additional findings: None     PLAN     Recommended plan for no diet, medication or health factor changes affecting INR     Dosing Instructions: Continue your current warfarin dose with next INR in 6 weeks       Summary  As of 11/24/2021    Full warfarin instructions:  10 mg every Tue, Sat; 7.5 mg all other days   Next INR check:  1/5/2022             Detailed voice message left for Barry with dosing instructions and follow up date.   Sent [employer] message with dosing and follow up instructions    Contact [phone]  to schedule and with any changes, questions or concerns.     Education provided: Please call back if any changes to your diet, medications or how you've been taking warfarin and Contact [phone]  with any changes, questions or concerns.     Plan made per [facility] anticoagulation protocol    Debbie Rodriguez RN  Anticoagulation Clinic  11/26/2021    _______________________________________________________________________     Anticoagulation Episode Summary     Current INR goal:  2.0-3.0   TTR:  91.3 % (1 y)   Target end date:  Indefinite   Send INR reminders to:  [facility]    Indications    Long-term (current) use of anticoagulants [Z79.01] [Z79.01]  Deep vein thrombosis (DVT) of proximal vein of left lower extremity  unspecified chronicity (H) [I82.4Y2]           Comments:           Anticoagulation Care Providers     Provider Role Specialty Phone number    Josette Sarmiento MD Referring Family Medicine  [phone]

## 2022-01-05 ENCOUNTER — ANTICOAGULATION THERAPY VISIT (OUTPATIENT)
Dept: ANTICOAGULATION | Facility: CLINIC | Age: 57
End: 2022-01-05

## 2022-01-05 ENCOUNTER — LAB (OUTPATIENT)
Dept: LAB | Facility: CLINIC | Age: 57
End: 2022-01-05
Payer: COMMERCIAL

## 2022-01-05 DIAGNOSIS — Z79.01 LONG TERM CURRENT USE OF ANTICOAGULANT THERAPY: Primary | ICD-10-CM

## 2022-01-05 DIAGNOSIS — I82.4Y2 DEEP VEIN THROMBOSIS (DVT) OF PROXIMAL VEIN OF LEFT LOWER EXTREMITY, UNSPECIFIED CHRONICITY (H): ICD-10-CM

## 2022-01-05 DIAGNOSIS — Z79.01 LONG TERM CURRENT USE OF ANTICOAGULANTS WITH INR GOAL OF 2.0-3.0: ICD-10-CM

## 2022-01-05 LAB — INR BLD: 2.5 (ref 0.9–1.1)

## 2022-01-05 PROCEDURE — 36416 COLLJ CAPILLARY BLOOD SPEC: CPT

## 2022-01-05 PROCEDURE — 85610 PROTHROMBIN TIME: CPT

## 2022-01-05 NOTE — PROGRESS NOTES
ANTICOAGULATION MANAGEMENT     Barry Herrera 56 year old male is on warfarin with therapeutic INR result. (Goal INR 2.0-3.0)    Recent labs: (last 7 days)     01/05/22  1153   INR 2.5*       ASSESSMENT     Source(s): Chart Review and Patient/Caregiver Call       Warfarin doses taken: Warfarin taken as instructed    Diet: No new diet changes identified    New illness, injury, or hospitalization: No    Medication/supplement changes: None noted    Signs or symptoms of bleeding or clotting: No    Previous INR: Therapeutic last 2(+) visits    Additional findings: None     PLAN     Recommended plan for no diet, medication or health factor changes affecting INR     Dosing Instructions: Continue your current warfarin dose with next INR in 6 weeks       Summary  As of 1/5/2022    Full warfarin instructions:  10 mg every Tue, Sat; 7.5 mg all other days   Next INR check:  2/16/2022             Telephone call with Barry who verbalizes understanding and agrees to plan    Lab visit scheduled    Education provided: Please call back if any changes to your diet, medications or how you've been taking warfarin and Contact [phone]  with any changes, questions or concerns.     Plan made per [facility] anticoagulation protocol    Debbie Rodriguez RN  Anticoagulation Clinic  1/5/2022    _______________________________________________________________________     Anticoagulation Episode Summary     Current INR goal:  2.0-3.0   TTR:  91.4 % (1 y)   Target end date:  Indefinite   Send INR reminders to:  [facility]    Indications    Long-term (current) use of anticoagulants [Z79.01] [Z79.01]  Deep vein thrombosis (DVT) of proximal vein of left lower extremity  unspecified chronicity (H) [I82.4Y2]           Comments:           Anticoagulation Care Providers     Provider Role Specialty Phone number    Josette Sarmiento MD Referring Family Medicine [phone]

## 2022-02-15 DIAGNOSIS — I82.4Y2 DEEP VEIN THROMBOSIS (DVT) OF PROXIMAL VEIN OF LEFT LOWER EXTREMITY, UNSPECIFIED CHRONICITY (H): ICD-10-CM

## 2022-02-15 DIAGNOSIS — Z79.01 LONG TERM CURRENT USE OF ANTICOAGULANT THERAPY: ICD-10-CM

## 2022-02-16 ENCOUNTER — ANTICOAGULATION THERAPY VISIT (OUTPATIENT)
Dept: ANTICOAGULATION | Facility: CLINIC | Age: 57
End: 2022-02-16

## 2022-02-16 ENCOUNTER — LAB (OUTPATIENT)
Dept: LAB | Facility: CLINIC | Age: 57
End: 2022-02-16
Payer: COMMERCIAL

## 2022-02-16 DIAGNOSIS — I82.4Y2 DEEP VEIN THROMBOSIS (DVT) OF PROXIMAL VEIN OF LEFT LOWER EXTREMITY, UNSPECIFIED CHRONICITY (H): ICD-10-CM

## 2022-02-16 DIAGNOSIS — Z79.01 LONG TERM CURRENT USE OF ANTICOAGULANT THERAPY: Primary | ICD-10-CM

## 2022-02-16 DIAGNOSIS — Z79.01 LONG TERM CURRENT USE OF ANTICOAGULANTS WITH INR GOAL OF 2.0-3.0: ICD-10-CM

## 2022-02-16 LAB — INR BLD: 1.9 (ref 0.9–1.1)

## 2022-02-16 PROCEDURE — 85610 PROTHROMBIN TIME: CPT

## 2022-02-16 PROCEDURE — 36416 COLLJ CAPILLARY BLOOD SPEC: CPT

## 2022-02-16 RX ORDER — WARFARIN SODIUM 5 MG/1
TABLET ORAL
Qty: 140 TABLET | Refills: 0 | Status: SHIPPED | OUTPATIENT
Start: 2022-02-16 | End: 2022-05-16

## 2022-02-16 NOTE — TELEPHONE ENCOUNTER
Warfarin - Prescription approved per [facility] Refill Protocol.    Ronda Shell RN   [facility] Anticoagulation Clinic

## 2022-02-16 NOTE — PROGRESS NOTES
ANTICOAGULATION MANAGEMENT     Barry Herrera 56 year old male is on warfarin with subtherapeutic INR result. (Goal INR 2.0-3.0)    Recent labs: (last 7 days)     02/16/22  1153   INR 1.9*       ASSESSMENT     Source(s): Chart Review and Patient/Caregiver Call       Warfarin doses taken: Warfarin taken as instructed and wondering if he missed the extra half tablet yesterday (10 mg day) but does not know for sure    Diet: No new diet changes identified    New illness, injury, or hospitalization: No    Medication/supplement changes: None noted    Signs or symptoms of bleeding or clotting: No    Previous INR: Therapeutic last 2(+) visits    Additional findings: None     PLAN     Recommended plan for no diet, medication or health factor changes affecting INR     Dosing Instructions: Continue your current warfarin dose with next INR in 2 weeks       Summary  As of 2/16/2022    Full warfarin instructions:  10 mg every Tue, Sat; 7.5 mg all other days   Next INR check:  3/2/2022             Telephone call with Barry who verbalizes understanding and agrees to plan    Lab visit scheduled    Education provided: Please call back if any changes to your diet, medications or how you've been taking warfarin    Plan made per [facility] anticoagulation protocol    Renetta Shell RN  Anticoagulation Clinic  2/16/2022    _______________________________________________________________________     Anticoagulation Episode Summary     Current INR goal:  2.0-3.0   TTR:  89.5 % (1 y)   Target end date:  Indefinite   Send INR reminders to:  [facility]    Indications    Long-term (current) use of anticoagulants [Z79.01] [Z79.01]  Deep vein thrombosis (DVT) of proximal vein of left lower extremity  unspecified chronicity (H) [I82.4Y2]           Comments:           Anticoagulation Care Providers     Provider Role Specialty Phone number    Josetet Sarmiento MD Referring Family Medicine [phone]

## 2022-03-02 ENCOUNTER — ANTICOAGULATION THERAPY VISIT (OUTPATIENT)
Dept: ANTICOAGULATION | Facility: CLINIC | Age: 57
End: 2022-03-02

## 2022-03-02 ENCOUNTER — LAB (OUTPATIENT)
Dept: LAB | Facility: CLINIC | Age: 57
End: 2022-03-02
Payer: COMMERCIAL

## 2022-03-02 DIAGNOSIS — I82.4Y2 DEEP VEIN THROMBOSIS (DVT) OF PROXIMAL VEIN OF LEFT LOWER EXTREMITY, UNSPECIFIED CHRONICITY (H): ICD-10-CM

## 2022-03-02 DIAGNOSIS — Z79.01 LONG TERM CURRENT USE OF ANTICOAGULANT THERAPY: Primary | ICD-10-CM

## 2022-03-02 DIAGNOSIS — Z79.01 LONG TERM CURRENT USE OF ANTICOAGULANTS WITH INR GOAL OF 2.0-3.0: ICD-10-CM

## 2022-03-02 LAB — INR BLD: 2.4 (ref 0.9–1.1)

## 2022-03-02 PROCEDURE — 36416 COLLJ CAPILLARY BLOOD SPEC: CPT

## 2022-03-02 PROCEDURE — 85610 PROTHROMBIN TIME: CPT

## 2022-03-02 NOTE — PROGRESS NOTES
ANTICOAGULATION MANAGEMENT     Barry Herrera 56 year old male is on warfarin with therapeutic INR result. (Goal INR 2.0-3.0)    Recent labs: (last 7 days)     03/02/22  1152   INR 2.4*       ASSESSMENT       Source(s): Chart Review    Previous INR was Subtherapeutic    Medication, diet, health changes since last INR chart reviewed; none identified           PLAN     Unable to reach Abdulaziz today.    Left message to continue current dose of warfarin 7.5 mg tonight. Request call back for assessment.    Follow up required to assess for changes     Renetta Shell RN  Anticoagulation Clinic  3/2/2022

## 2022-03-02 NOTE — PROGRESS NOTES
ANTICOAGULATION MANAGEMENT     Barry Herrera 56 year old male is on warfarin with therapeutic INR result. (Goal INR 2.0-3.0)    Recent labs: (last 7 days)     03/02/22  1152   INR 2.4*       ASSESSMENT       Source(s): Chart Review and Patient/Caregiver Call       Warfarin doses taken: Warfarin taken as instructed    Diet: No new diet changes identified    New illness, injury, or hospitalization: No    Medication/supplement changes: None noted    Signs or symptoms of bleeding or clotting: No    Previous INR: Subtherapeutic    Additional findings: None       PLAN     Recommended plan for no diet, medication or health factor changes affecting INR     Dosing Instructions: Continue your current warfarin dose with next INR in 6 weeks       Summary  As of 3/2/2022    Full warfarin instructions:  10 mg every Tue, Sat; 7.5 mg all other days   Next INR check:  4/13/2022             Telephone call with Barry who verbalizes understanding and agrees to plan    Lab visit scheduled    Education provided: Please call back if any changes to your diet, medications or how you've been taking warfarin    Plan made per [facility] anticoagulation protocol    Renetta Shell RN  Anticoagulation Clinic  3/2/2022    _______________________________________________________________________     Anticoagulation Episode Summary     Current INR goal:  2.0-3.0   TTR:  88.7 % (1 y)   Target end date:  Indefinite   Send INR reminders to:  [facility]    Indications    Long-term (current) use of anticoagulants [Z79.01] [Z79.01]  Deep vein thrombosis (DVT) of proximal vein of left lower extremity  unspecified chronicity (H) [I82.4Y2]           Comments:           Anticoagulation Care Providers     Provider Role Specialty Phone number    Josette Sarmiento MD Referring Family Medicine [phone]

## 2022-03-02 NOTE — PROGRESS NOTES
PCR left VM on [place] phone reporting patient returning call for [facility] RN.   Please call back when able.     Cierra Friedman, RN, BSN, PHN  Anticoagulation Nurse  [phone]

## 2022-04-13 ENCOUNTER — LAB (OUTPATIENT)
Dept: LAB | Facility: CLINIC | Age: 57
End: 2022-04-13
Payer: COMMERCIAL

## 2022-04-13 ENCOUNTER — ANTICOAGULATION THERAPY VISIT (OUTPATIENT)
Dept: ANTICOAGULATION | Facility: CLINIC | Age: 57
End: 2022-04-13

## 2022-04-13 DIAGNOSIS — Z79.01 LONG TERM CURRENT USE OF ANTICOAGULANTS WITH INR GOAL OF 2.0-3.0: ICD-10-CM

## 2022-04-13 DIAGNOSIS — I82.4Y2 DEEP VEIN THROMBOSIS (DVT) OF PROXIMAL VEIN OF LEFT LOWER EXTREMITY, UNSPECIFIED CHRONICITY (H): ICD-10-CM

## 2022-04-13 DIAGNOSIS — Z79.01 LONG TERM CURRENT USE OF ANTICOAGULANT THERAPY: Primary | ICD-10-CM

## 2022-04-13 LAB — INR BLD: 2.7 (ref 0.9–1.1)

## 2022-04-13 PROCEDURE — 36416 COLLJ CAPILLARY BLOOD SPEC: CPT

## 2022-04-13 PROCEDURE — 85610 PROTHROMBIN TIME: CPT

## 2022-04-13 NOTE — PROGRESS NOTES
ANTICOAGULATION MANAGEMENT     Barry Herrera 56 year old male is on warfarin with therapeutic INR result. (Goal INR 2.0-3.0)    Recent labs: (last 7 days)     04/13/22  1200   INR 2.7*       ASSESSMENT       Source(s): Chart Review and Patient/Caregiver Call       Warfarin doses taken: Warfarin taken as instructed    Diet: No new diet changes identified    New illness, injury, or hospitalization: No    Medication/supplement changes: None noted    Signs or symptoms of bleeding or clotting: No    Previous INR: Therapeutic last visit; previously outside of goal range    Additional findings: None       PLAN     Recommended plan for no diet, medication or health factor changes affecting INR     Dosing Instructions: continue your current warfarin dose with next INR in 6 weeks       Summary  As of 4/13/2022    Full warfarin instructions:  10 mg every Tue, Sat; 7.5 mg all other days   Next INR check:  5/25/2022             Telephone call with Abdulaziz who verbalizes understanding and agrees to plan    Lab visit scheduled    Education provided: Please call back if any changes to your diet, medications or how you've been taking warfarin and Contact [phone]  with any changes, questions or concerns.     Plan made per [facility] anticoagulation protocol    Debbie Rodriguez RN  Anticoagulation Clinic  4/13/2022    _______________________________________________________________________     Anticoagulation Episode Summary     Current INR goal:  2.0-3.0   TTR:  88.7 % (1 y)   Target end date:  Indefinite   Send INR reminders to:  [facility]    Indications    Long-term (current) use of anticoagulants [Z79.01] [Z79.01]  Deep vein thrombosis (DVT) of proximal vein of left lower extremity  unspecified chronicity (H) [I82.4Y2]           Comments:           Anticoagulation Care Providers     Provider Role Specialty Phone number    Josette Sarmiento MD Referring Family Medicine [phone]

## 2022-05-12 DIAGNOSIS — I82.4Y2 DEEP VEIN THROMBOSIS (DVT) OF PROXIMAL VEIN OF LEFT LOWER EXTREMITY, UNSPECIFIED CHRONICITY (H): ICD-10-CM

## 2022-05-12 DIAGNOSIS — Z79.01 LONG TERM CURRENT USE OF ANTICOAGULANT THERAPY: ICD-10-CM

## 2022-05-13 NOTE — TELEPHONE ENCOUNTER
Routing refill request to provider for review/approval because:  INR is within goal in the past 6 weeks    Confirm INR is within goal in the past 6 weeks.          Recent Labs   Lab Test 04/13/22  1200   INR 2.7*      Tari Young RN

## 2022-05-16 DIAGNOSIS — Z79.01 LONG TERM CURRENT USE OF ANTICOAGULANT THERAPY: ICD-10-CM

## 2022-05-16 DIAGNOSIS — I82.4Y2 DEEP VEIN THROMBOSIS (DVT) OF PROXIMAL VEIN OF LEFT LOWER EXTREMITY, UNSPECIFIED CHRONICITY (H): ICD-10-CM

## 2022-05-16 RX ORDER — WARFARIN SODIUM 5 MG/1
TABLET ORAL
Qty: 150 TABLET | Refills: 1 | Status: SHIPPED | OUTPATIENT
Start: 2022-05-16 | End: 2022-05-17

## 2022-05-16 NOTE — TELEPHONE ENCOUNTER
ANTICOAGULATION MANAGEMENT:  Medication Refill    Anticoagulation Summary  As of 4/13/2022    Warfarin maintenance plan:  10 mg (5 mg x 2) every Tue, Sat; 7.5 mg (5 mg x 1.5) all other days   Next INR check:  5/25/2022   Target end date:  Indefinite    Indications    Long-term (current) use of anticoagulants [Z79.01] [Z79.01]  Deep vein thrombosis (DVT) of proximal vein of left lower extremity  unspecified chronicity (H) [I82.4Y2]             Anticoagulation Care Providers     Provider Role Specialty Phone number    Josette Sarmiento MD Referring Family Medicine [phone]          Visit with referring provider/group within last year: Yes    [facility] referral signed within last year: Yes    Barry meets all criteria for refill (current [facility] referral, office visit with referring provider/group in last year, lab monitoring up to date or not exceeding 2 weeks overdue). Rx instructions and quantity supplied updated to match patient's current dosing plan. Warfarin 90 day supply with 1 refill granted per [facility] protocol     Krystal Lira RN  Anticoagulation Clinic

## 2022-05-17 RX ORDER — WARFARIN SODIUM 5 MG/1
TABLET ORAL
Qty: 140 TABLET | Refills: 1 | Status: SHIPPED | OUTPATIENT
Start: 2022-05-17 | End: 2022-11-14

## 2022-05-17 NOTE — TELEPHONE ENCOUNTER
ANTICOAGULATION MANAGEMENT:  Medication Refill    Anticoagulation Summary  As of 4/13/2022    Warfarin maintenance plan:  10 mg (5 mg x 2) every Tue, Sat; 7.5 mg (5 mg x 1.5) all other days   Next INR check:  5/25/2022   Target end date:  Indefinite    Indications    Long-term (current) use of anticoagulants [Z79.01] [Z79.01]  Deep vein thrombosis (DVT) of proximal vein of left lower extremity  unspecified chronicity (H) [I82.4Y2]             Anticoagulation Care Providers     Provider Role Specialty Phone number    Jsoette Sarmiento MD Referring Family Medicine [phone]          Visit with referring provider/group within last year: Yes    [facility] referral signed within last year: Yes    Barry meets all criteria for refill (current [facility] referral, office visit with referring provider/group in last year, lab monitoring up to date or not exceeding 2 weeks overdue). Rx instructions and quantity supplied updated to match patient's current dosing plan. Warfarin 90 day supply with 1 refill granted per [facility] protocol     Renetta Shell RN  Anticoagulation Clinic

## 2022-05-25 ENCOUNTER — ANTICOAGULATION THERAPY VISIT (OUTPATIENT)
Dept: ANTICOAGULATION | Facility: CLINIC | Age: 57
End: 2022-05-25

## 2022-05-25 ENCOUNTER — LAB (OUTPATIENT)
Dept: LAB | Facility: CLINIC | Age: 57
End: 2022-05-25
Payer: COMMERCIAL

## 2022-05-25 DIAGNOSIS — Z79.01 LONG TERM CURRENT USE OF ANTICOAGULANT THERAPY: Primary | ICD-10-CM

## 2022-05-25 DIAGNOSIS — I82.4Y2 DEEP VEIN THROMBOSIS (DVT) OF PROXIMAL VEIN OF LEFT LOWER EXTREMITY, UNSPECIFIED CHRONICITY (H): ICD-10-CM

## 2022-05-25 DIAGNOSIS — Z79.01 LONG TERM CURRENT USE OF ANTICOAGULANTS WITH INR GOAL OF 2.0-3.0: ICD-10-CM

## 2022-05-25 LAB — INR BLD: 2.5 (ref 0.9–1.1)

## 2022-05-25 PROCEDURE — 85610 PROTHROMBIN TIME: CPT

## 2022-05-25 PROCEDURE — 36415 COLL VENOUS BLD VENIPUNCTURE: CPT

## 2022-05-25 NOTE — PROGRESS NOTES
ANTICOAGULATION MANAGEMENT     Barry Herrera 56 year old male is on warfarin with therapeutic INR result. (Goal INR 2.0-3.0)    Recent labs: (last 7 days)     05/25/22  1151   INR 2.5*       ASSESSMENT       Source(s): Chart Review and Patient/Caregiver Call       Warfarin doses taken: Warfarin taken as instructed    Diet: No new diet changes identified    New illness, injury, or hospitalization: No    Medication/supplement changes: None noted    Signs or symptoms of bleeding or clotting: No    Previous INR: Therapeutic last 2(+) visits    Additional findings: None       PLAN     Recommended plan for no diet, medication or health factor changes affecting INR     Dosing Instructions: continue your current warfarin dose with next INR in 6 weeks       Summary  As of 5/25/2022    Full warfarin instructions:  10 mg every Tue, Sat; 7.5 mg all other days   Next INR check:  7/6/2022             Telephone call with Abdulaziz who verbalizes understanding and agrees to plan    Lab visit scheduled    Education provided: Please call back if any changes to your diet, medications or how you've been taking warfarin    Plan made per [facility] anticoagulation protocol    Renetta Shell RN  Anticoagulation Clinic  5/25/2022    _______________________________________________________________________     Anticoagulation Episode Summary     Current INR goal:  2.0-3.0   TTR:  88.7 % (1 y)   Target end date:  Indefinite   Send INR reminders to:  [facility]    Indications    Long-term (current) use of anticoagulants [Z79.01] [Z79.01]  Deep vein thrombosis (DVT) of proximal vein of left lower extremity  unspecified chronicity (H) [I82.4Y2]           Comments:           Anticoagulation Care Providers     Provider Role Specialty Phone number    Josette Sarmiento MD Referring Family Medicine [phone]

## 2022-06-21 ENCOUNTER — TRANSFERRED RECORDS (OUTPATIENT)
Dept: HEALTH INFORMATION MANAGEMENT | Facility: CLINIC | Age: 57
End: 2022-06-21

## 2022-06-29 ENCOUNTER — TRANSFERRED RECORDS (OUTPATIENT)
Dept: HEALTH INFORMATION MANAGEMENT | Facility: CLINIC | Age: 57
End: 2022-06-29

## 2022-07-06 ENCOUNTER — TELEPHONE (OUTPATIENT)
Dept: FAMILY MEDICINE | Facility: CLINIC | Age: 57
End: 2022-07-06

## 2022-07-06 ENCOUNTER — ANTICOAGULATION THERAPY VISIT (OUTPATIENT)
Dept: ANTICOAGULATION | Facility: CLINIC | Age: 57
End: 2022-07-06

## 2022-07-06 ENCOUNTER — LAB (OUTPATIENT)
Dept: LAB | Facility: CLINIC | Age: 57
End: 2022-07-06
Payer: COMMERCIAL

## 2022-07-06 DIAGNOSIS — I82.4Y2 DEEP VEIN THROMBOSIS (DVT) OF PROXIMAL VEIN OF LEFT LOWER EXTREMITY, UNSPECIFIED CHRONICITY (H): ICD-10-CM

## 2022-07-06 DIAGNOSIS — Z79.01 LONG TERM CURRENT USE OF ANTICOAGULANTS WITH INR GOAL OF 2.0-3.0: ICD-10-CM

## 2022-07-06 DIAGNOSIS — Z79.01 LONG TERM CURRENT USE OF ANTICOAGULANT THERAPY: Primary | ICD-10-CM

## 2022-07-06 LAB — INR BLD: 2.2 (ref 0.9–1.1)

## 2022-07-06 PROCEDURE — 36416 COLLJ CAPILLARY BLOOD SPEC: CPT

## 2022-07-06 PROCEDURE — 85610 PROTHROMBIN TIME: CPT

## 2022-07-06 NOTE — TELEPHONE ENCOUNTER
ANTICOAGULATION CLINIC REFERRAL RENEWAL REQUEST       An annual renewal order is required for all patients referred to [facility].?  Please review and sign the pended referral order for Barry Herrera.       ANTICOAGULATION SUMMARY      Warfarin indication(s)   DVT    Mechanical heart valve present?  NO       Current goal range   INR: 2.0-3.0     Goal appropriate for indication? Goal INR 2-3, standard for indication(s) above     Time in Therapeutic Range (TTR)  (Goal > 60%) 96%       Office visit with referring provider's group within last year yes on 9/1/2021       Renetta Shell RN  [facility]

## 2022-07-06 NOTE — PROGRESS NOTES
ANTICOAGULATION MANAGEMENT     Barry Herrera 56 year old male is on warfarin with therapeutic INR result. (Goal INR 2.0-3.0)    Recent labs: (last 7 days)     07/06/22  1148   INR 2.2*       ASSESSMENT       Source(s): Chart Review and Patient/Caregiver Call       Warfarin doses taken: Warfarin taken as instructed    Diet: No new diet changes identified    New illness, injury, or hospitalization: No    Medication/supplement changes: None noted    Signs or symptoms of bleeding or clotting: No    Previous INR: Therapeutic last 2(+) visits    Additional findings: None       PLAN     Recommended plan for no diet, medication or health factor changes affecting INR     Dosing Instructions: continue your current warfarin dose with next INR in 6 weeks       Summary  As of 7/6/2022    Full warfarin instructions:  10 mg every Tue, Sat; 7.5 mg all other days   Next INR check:  8/17/2022             Telephone call with Abdulaziz who verbalizes understanding and agrees to plan    Lab visit scheduled    Education provided: Please call back if any changes to your diet, medications or how you've been taking warfarin    Plan made per [facility] anticoagulation protocol    Renetta Shell RN  Anticoagulation Clinic  7/6/2022    _______________________________________________________________________     Anticoagulation Episode Summary     Current INR goal:  2.0-3.0   TTR:  96.0 % (1 y)   Target end date:  Indefinite   Send INR reminders to:  [facility]    Indications    Long-term (current) use of anticoagulants [Z79.01] [Z79.01]  Deep vein thrombosis (DVT) of proximal vein of left lower extremity  unspecified chronicity (H) [I82.4Y2]           Comments:           Anticoagulation Care Providers     Provider Role Specialty Phone number    Josette Sarmiento MD Referring Family Medicine [phone]

## 2022-08-17 ENCOUNTER — ANTICOAGULATION THERAPY VISIT (OUTPATIENT)
Dept: ANTICOAGULATION | Facility: CLINIC | Age: 57
End: 2022-08-17

## 2022-08-17 ENCOUNTER — TELEPHONE (OUTPATIENT)
Dept: FAMILY MEDICINE | Facility: CLINIC | Age: 57
End: 2022-08-17

## 2022-08-17 ENCOUNTER — LAB (OUTPATIENT)
Dept: LAB | Facility: CLINIC | Age: 57
End: 2022-08-17
Payer: COMMERCIAL

## 2022-08-17 DIAGNOSIS — Z79.01 LONG TERM CURRENT USE OF ANTICOAGULANT THERAPY: Primary | ICD-10-CM

## 2022-08-17 DIAGNOSIS — I82.4Y2 DEEP VEIN THROMBOSIS (DVT) OF PROXIMAL VEIN OF LEFT LOWER EXTREMITY, UNSPECIFIED CHRONICITY (H): ICD-10-CM

## 2022-08-17 DIAGNOSIS — Z79.01 LONG TERM CURRENT USE OF ANTICOAGULANT THERAPY: ICD-10-CM

## 2022-08-17 LAB — INR BLD: 3.2 (ref 0.9–1.1)

## 2022-08-17 PROCEDURE — 85610 PROTHROMBIN TIME: CPT

## 2022-08-17 PROCEDURE — 36416 COLLJ CAPILLARY BLOOD SPEC: CPT

## 2022-08-17 NOTE — TELEPHONE ENCOUNTER
Reason for Call:  Other returning call    Detailed comments: PT RETURNING CALL FROM [facility].     Phone Number Patient can be reached at: Cell number on file:    Telephone Information:   Mobile [phone]       Best Time: ANYTIME    Can we leave a detailed message on this number? YES    Call taken on 8/17/2022 at 2:08 PM by Leni Herman

## 2022-08-17 NOTE — PROGRESS NOTES
ANTICOAGULATION MANAGEMENT     Barry Herrera 56 year old male is on warfarin with supratherapeutic INR result. (Goal INR 2.0-3.0)    Recent labs: (last 7 days)     08/17/22  1145   INR 3.2*       ASSESSMENT       Source(s): Chart Review    Previous INR was Therapeutic last 2(+) visits, checking every 6 weeks    Medication, diet, health changes since last INR chart reviewed; none identified           PLAN     Unable to reach Abdulaziz today.    Left message to continue current dose of warfarin 7.5 mg tonight. Request call back for assessment.    Follow up required to discuss out of range result , if no temporary factors or concerns, then next INR in 2 weeks and same dosing.    Debbie Rodriguez, RN  Anticoagulation Clinic  8/17/2022

## 2022-08-17 NOTE — PROGRESS NOTES
ANTICOAGULATION MANAGEMENT     Barry Herrera 56 year old male is on warfarin with supratherapeutic INR result. (Goal INR 2.0-3.0)    Recent labs: (last 7 days)     08/17/22  1145   INR 3.2*       ASSESSMENT       Source(s): Chart Review and Patient/Caregiver Call       Warfarin doses taken: Warfarin taken as instructed    Diet: Decreased greens/vitamin K in diet; plans to resume previous intake. Just returned from vacation.    New illness, injury, or hospitalization: No    Medication/supplement changes: None noted    Signs or symptoms of bleeding or clotting: No    Previous INR: Therapeutic last 2(+) visits    Additional findings: None       PLAN     Recommended plan for temporary change(s) affecting INR     Dosing Instructions: partial hold then continue your current warfarin dose with next INR in 2 weeks       Summary  As of 8/17/2022    Full warfarin instructions:  8/17: 5 mg; Otherwise 10 mg every Tue, Sat; 7.5 mg all other days   Next INR check:  8/31/2022             Telephone call with Abdulaziz who verbalizes understanding and agrees to plan    Lab visit scheduled    Education provided: Please call back if any changes to your diet, medications or how you've been taking warfarin, Importance of consistent vitamin K intake, Goal range and significance of current result, Monitoring for bleeding signs and symptoms, Monitoring for clotting signs and symptoms and Contact [phone]  with any changes, questions or concerns.     Plan made per [facility] anticoagulation protocol    Debbie Rodriguez RN  Anticoagulation Clinic  8/17/2022    _______________________________________________________________________     Anticoagulation Episode Summary     Current INR goal:  2.0-3.0   TTR:  95.0 % (1 y)   Target end date:  Indefinite   Send INR reminders to:  [facility]    Indications    Long-term (current) use of anticoagulants [Z79.01] [Z79.01]  Deep vein thrombosis (DVT) of proximal vein of left lower  extremity  unspecified chronicity (H) [I82.4Y2]           Comments:           Anticoagulation Care Providers     Provider Role Specialty Phone number    Josette Sarmiento MD Referring Family Medicine [phone]

## 2022-08-31 ENCOUNTER — LAB (OUTPATIENT)
Dept: LAB | Facility: CLINIC | Age: 57
End: 2022-08-31
Payer: COMMERCIAL

## 2022-08-31 ENCOUNTER — ANTICOAGULATION THERAPY VISIT (OUTPATIENT)
Dept: ANTICOAGULATION | Facility: CLINIC | Age: 57
End: 2022-08-31

## 2022-08-31 DIAGNOSIS — I82.4Y2 DEEP VEIN THROMBOSIS (DVT) OF PROXIMAL VEIN OF LEFT LOWER EXTREMITY, UNSPECIFIED CHRONICITY (H): ICD-10-CM

## 2022-08-31 DIAGNOSIS — Z79.01 LONG TERM CURRENT USE OF ANTICOAGULANT THERAPY: Primary | ICD-10-CM

## 2022-08-31 DIAGNOSIS — Z79.01 LONG TERM CURRENT USE OF ANTICOAGULANT THERAPY: ICD-10-CM

## 2022-08-31 LAB — INR BLD: 3.5 (ref 0.9–1.1)

## 2022-08-31 PROCEDURE — 36416 COLLJ CAPILLARY BLOOD SPEC: CPT

## 2022-08-31 PROCEDURE — 85610 PROTHROMBIN TIME: CPT

## 2022-08-31 NOTE — PROGRESS NOTES
ANTICOAGULATION MANAGEMENT     Barry Herrera 56 year old male is on warfarin with supratherapeutic INR result. (Goal INR 2.0-3.0)    Recent labs: (last 7 days)     08/31/22  1311   INR 3.5*       ASSESSMENT       Source(s): Chart Review and Patient/Caregiver Call       Warfarin doses taken: Warfarin taken as instructed    Diet: Decreased greens/vitamin K in diet; plans to resume previous intake    New illness, injury, or hospitalization: No    Medication/supplement changes: None noted    Signs or symptoms of bleeding or clotting: No    Previous INR: Supratherapeutic    Additional findings: Eating differently this summer, states diet will marielos back to normal with kids going to college       PLAN     Recommended plan for temporary change(s) affecting INR     Dosing Instructions: partial hold then continue your current warfarin dose with next INR in 2 weeks       Summary  As of 8/31/2022    Full warfarin instructions:  8/31: 5 mg; Otherwise 10 mg every Tue, Sat; 7.5 mg all other days   Next INR check:  9/14/2022             Telephone call with Abdulaziz who verbalizes understanding and agrees to plan    Lab visit scheduled    Education provided: Please call back if any changes to your diet, medications or how you've been taking warfarin    Plan made per [facility] anticoagulation protocol    Renetta Shell RN  Anticoagulation Clinic  8/31/2022    _______________________________________________________________________     Anticoagulation Episode Summary     Current INR goal:  2.0-3.0   TTR:  91.2 % (1 y)   Target end date:  Indefinite   Send INR reminders to:  [facility]    Indications    Long-term (current) use of anticoagulants [Z79.01] [Z79.01]  Deep vein thrombosis (DVT) of proximal vein of left lower extremity  unspecified chronicity (H) [I82.4Y2]           Comments:           Anticoagulation Care Providers     Provider Role Specialty Phone number    Josette Sarmiento MD Referring Family Medicine [phone]

## 2022-09-14 ENCOUNTER — LAB (OUTPATIENT)
Dept: LAB | Facility: CLINIC | Age: 57
End: 2022-09-14
Payer: COMMERCIAL

## 2022-09-14 ENCOUNTER — ANTICOAGULATION THERAPY VISIT (OUTPATIENT)
Dept: ANTICOAGULATION | Facility: CLINIC | Age: 57
End: 2022-09-14

## 2022-09-14 DIAGNOSIS — I82.4Y2 DEEP VEIN THROMBOSIS (DVT) OF PROXIMAL VEIN OF LEFT LOWER EXTREMITY, UNSPECIFIED CHRONICITY (H): ICD-10-CM

## 2022-09-14 DIAGNOSIS — Z79.01 LONG TERM CURRENT USE OF ANTICOAGULANT THERAPY: Primary | ICD-10-CM

## 2022-09-14 DIAGNOSIS — Z79.01 LONG TERM CURRENT USE OF ANTICOAGULANT THERAPY: ICD-10-CM

## 2022-09-14 LAB — INR BLD: 2.6 (ref 0.9–1.1)

## 2022-09-14 PROCEDURE — 85610 PROTHROMBIN TIME: CPT

## 2022-09-14 PROCEDURE — 36416 COLLJ CAPILLARY BLOOD SPEC: CPT

## 2022-09-14 NOTE — PROGRESS NOTES
ANTICOAGULATION MANAGEMENT   therapeuticJoseph E Solheid 56 year old male is on warfarin with therapeutic INR result. (Goal INR 2.0-3.0)    Recent labs: (last 7 days)     09/14/22  1148   INR 2.6*       ASSESSMENT       Source(s): Chart Review and Patient/Caregiver Call       Warfarin doses taken: Warfarin taken as instructed    Diet: No new diet changes identified    New illness, injury, or hospitalization: No    Medication/supplement changes: None noted    Signs or symptoms of bleeding or clotting: No    Previous INR: Supratherapeutic    Additional findings: None       PLAN     Recommended plan for no diet, medication or health factor changes affecting INR     Dosing Instructions: Continue your current warfarin dose with next INR in 4 weeks (advised 3 weeks but patient preferred 4 weeks)      Summary  As of 9/14/2022    Full warfarin instructions:  10 mg every Tue, Sat; 7.5 mg all other days   Next INR check:  10/12/2022             Telephone call with Abdulaziz who verbalizes understanding and agrees to plan    Lab visit scheduled    Education provided: Please call back if any changes to your diet, medications or how you've been taking warfarin    Plan made per [facility] anticoagulation protocol    Renetta Shell RN  Anticoagulation Clinic  9/14/2022    _______________________________________________________________________     Anticoagulation Episode Summary     Current INR goal:  2.0-3.0   TTR:  89.1 % (1 y)   Target end date:  Indefinite   Send INR reminders to:  [facility]    Indications    Long-term (current) use of anticoagulants [Z79.01] [Z79.01]  Deep vein thrombosis (DVT) of proximal vein of left lower extremity  unspecified chronicity (H) [I82.4Y2]           Comments:           Anticoagulation Care Providers     Provider Role Specialty Phone number    Josette Sarmiento MD Referring Family Medicine [phone]

## 2022-10-12 ENCOUNTER — LAB (OUTPATIENT)
Dept: LAB | Facility: CLINIC | Age: 57
End: 2022-10-12
Payer: COMMERCIAL

## 2022-10-12 ENCOUNTER — ANTICOAGULATION THERAPY VISIT (OUTPATIENT)
Dept: ANTICOAGULATION | Facility: CLINIC | Age: 57
End: 2022-10-12

## 2022-10-12 DIAGNOSIS — Z79.01 LONG TERM CURRENT USE OF ANTICOAGULANT THERAPY: ICD-10-CM

## 2022-10-12 DIAGNOSIS — I82.4Y2 DEEP VEIN THROMBOSIS (DVT) OF PROXIMAL VEIN OF LEFT LOWER EXTREMITY, UNSPECIFIED CHRONICITY (H): ICD-10-CM

## 2022-10-12 DIAGNOSIS — Z79.01 LONG TERM CURRENT USE OF ANTICOAGULANT THERAPY: Primary | ICD-10-CM

## 2022-10-12 LAB — INR BLD: 2.1 (ref 0.9–1.1)

## 2022-10-12 PROCEDURE — 36416 COLLJ CAPILLARY BLOOD SPEC: CPT

## 2022-10-12 PROCEDURE — 85610 PROTHROMBIN TIME: CPT

## 2022-10-12 NOTE — PROGRESS NOTES
ANTICOAGULATION MANAGEMENT     Barry Herrera 56 year old male is on warfarin with therapeutic INR result. (Goal INR 2.0-3.0)    Recent labs: (last 7 days)     10/12/22  1152   INR 2.1*       ASSESSMENT       Source(s): Chart Review and Patient/Caregiver Call       Warfarin doses taken: Warfarin taken as instructed    Diet: No new diet changes identified    New illness, injury, or hospitalization: No    Medication/supplement changes: None noted    Signs or symptoms of bleeding or clotting: No    Previous INR: Therapeutic last visit; previously outside of goal range    Additional findings: None       PLAN     Recommended plan for no diet, medication or health factor changes affecting INR     Dosing Instructions: Continue your current warfarin dose with next INR in 5 weeks       Summary  As of 10/12/2022    Full warfarin instructions:  10 mg every Tue, Sat; 7.5 mg all other days   Next INR check:  11/16/2022             Telephone call with Abdulaziz who verbalizes understanding and agrees to plan    Patient elected to schedule next visit in six weeks, rather than recommended 5 weeks.    Education provided: Please call back if any changes to your diet, medications or how you've been taking warfarin, Importance of consistent vitamin K intake and Contact [phone]  with any changes, questions or concerns.     Plan made per [facility] anticoagulation protocol    Debbie Rodriguez RN  Anticoagulation Clinic  10/12/2022    _______________________________________________________________________     Anticoagulation Episode Summary     Current INR goal:  2.0-3.0   TTR:  89.1 % (1 y)   Target end date:  Indefinite   Send INR reminders to:  [facility]    Indications    Long-term (current) use of anticoagulants [Z79.01] [Z79.01]  Deep vein thrombosis (DVT) of proximal vein of left lower extremity  unspecified chronicity (H) [I82.4Y2]           Comments:           Anticoagulation Care Providers     Provider Role Specialty  Phone number    Josette Sarmiento MD Referring Family Medicine [phone]

## 2022-11-09 ENCOUNTER — E-VISIT (OUTPATIENT)
Dept: URGENT CARE | Facility: CLINIC | Age: 57
End: 2022-11-09
Payer: COMMERCIAL

## 2022-11-09 DIAGNOSIS — U07.1 INFECTION DUE TO 2019 NOVEL CORONAVIRUS: Primary | ICD-10-CM

## 2022-11-09 PROCEDURE — 99421 OL DIG E/M SVC 5-10 MIN: CPT | Mod: CS | Performed by: PHYSICIAN ASSISTANT

## 2022-11-09 NOTE — PATIENT INSTRUCTIONS
The symptoms you describe suggest a viral cause, which is much more common than a bacterial cause. Antibiotics will treat bacterial infections, but have no effect on viral infections. If possible, especially if improving, start with symptom care for the first 7-10 days, then consider seeking further treatment or taking an antibiotic. Bacterial infections generally are more severe, including symptoms such as pus, fever over 101degrees F, or rapidly worsening.  Dear Abdulaziz,    I believe your symptoms are still related to your COVID infection, as these can wax and wane. Try Mucinex and Flonase, Tylenol as needed. I hope you feel better soon!    Joana Rocha PA-C

## 2022-11-11 DIAGNOSIS — I82.4Y2 DEEP VEIN THROMBOSIS (DVT) OF PROXIMAL VEIN OF LEFT LOWER EXTREMITY, UNSPECIFIED CHRONICITY (H): ICD-10-CM

## 2022-11-11 DIAGNOSIS — Z79.01 LONG TERM CURRENT USE OF ANTICOAGULANT THERAPY: ICD-10-CM

## 2022-11-14 RX ORDER — WARFARIN SODIUM 5 MG/1
TABLET ORAL
Qty: 50 TABLET | Refills: 0 | Status: SHIPPED | OUTPATIENT
Start: 2022-11-14 | End: 2022-11-23

## 2022-11-14 NOTE — TELEPHONE ENCOUNTER
ANTICOAGULATION MANAGEMENT:  Medication Refill    Anticoagulation Summary  As of 10/12/2022    Warfarin maintenance plan:  10 mg (5 mg x 2) every Tue, Sat; 7.5 mg (5 mg x 1.5) all other days; Starting 10/12/2022   Next INR check:  11/16/2022   Target end date:  Indefinite    Indications    Long-term (current) use of anticoagulants [Z79.01] [Z79.01]  Deep vein thrombosis (DVT) of proximal vein of left lower extremity  unspecified chronicity (H) [I82.4Y2]             Anticoagulation Care Providers     Provider Role Specialty Phone number    Josette Sarmiento MD Referring Family Medicine [phone]          Visit with referring provider/group within last year: No, last visit date: 9/1/21    [facility] referral signed within last year: Yes    Barry does NOT meet all criteria for refill: Visit with referring provider's group was >= 1 year ago. 30 day kenney fill approved; patient notified to schedule annual per [facility] protocol    Krystal Lira RN  Anticoagulation Clinic

## 2022-11-20 ENCOUNTER — HEALTH MAINTENANCE LETTER (OUTPATIENT)
Age: 57
End: 2022-11-20

## 2022-11-23 ENCOUNTER — LAB (OUTPATIENT)
Dept: LAB | Facility: CLINIC | Age: 57
End: 2022-11-23
Payer: COMMERCIAL

## 2022-11-23 ENCOUNTER — VIRTUAL VISIT (OUTPATIENT)
Dept: FAMILY MEDICINE | Facility: CLINIC | Age: 57
End: 2022-11-23
Payer: COMMERCIAL

## 2022-11-23 ENCOUNTER — ANTICOAGULATION THERAPY VISIT (OUTPATIENT)
Dept: ANTICOAGULATION | Facility: CLINIC | Age: 57
End: 2022-11-23

## 2022-11-23 DIAGNOSIS — I82.4Y2 DEEP VEIN THROMBOSIS (DVT) OF PROXIMAL VEIN OF LEFT LOWER EXTREMITY, UNSPECIFIED CHRONICITY (H): ICD-10-CM

## 2022-11-23 DIAGNOSIS — Z79.01 LONG TERM CURRENT USE OF ANTICOAGULANT THERAPY: Primary | ICD-10-CM

## 2022-11-23 DIAGNOSIS — Z79.01 LONG TERM CURRENT USE OF ANTICOAGULANT THERAPY: ICD-10-CM

## 2022-11-23 LAB — INR BLD: 3.2 (ref 0.9–1.1)

## 2022-11-23 PROCEDURE — 36416 COLLJ CAPILLARY BLOOD SPEC: CPT

## 2022-11-23 PROCEDURE — 99213 OFFICE O/P EST LOW 20 MIN: CPT | Mod: TEL | Performed by: FAMILY MEDICINE

## 2022-11-23 PROCEDURE — 85610 PROTHROMBIN TIME: CPT

## 2022-11-23 RX ORDER — WARFARIN SODIUM 5 MG/1
TABLET ORAL
Qty: 50 TABLET | Refills: 1 | Status: SHIPPED | OUTPATIENT
Start: 2022-11-23

## 2022-11-23 RX ORDER — WARFARIN SODIUM 5 MG/1
TABLET ORAL
Qty: 50 TABLET | Refills: 0 | Status: SHIPPED | OUTPATIENT
Start: 2022-11-23 | End: 2022-11-23

## 2022-11-23 NOTE — PROGRESS NOTES
Abdulaziz is a 57 year old who is being evaluated via a billable telephone visit.      What phone number would you like to be contacted at? [phone]  How would you like to obtain your AVS? MyChart    Assessment & Plan     Long term current use of anticoagulant therapy    - JANTOVEN ANTICOAGULANT 5 MG tablet; CURRENT DOSE: TAKE 2 TABS BY MOUTH ON TUESDAY AND SATURDAY + 1.5 TABS ALL OTHER DAYS OR AS DIRECTED BY INR.    Deep vein thrombosis (DVT) of proximal vein of left lower extremity, unspecified chronicity (H)    - JANTOVEN ANTICOAGULANT 5 MG tablet; CURRENT DOSE: TAKE 2 TABS BY MOUTH ON TUESDAY AND SATURDAY + 1.5 TABS ALL OTHER DAYS OR AS DIRECTED BY INR.      No follow-ups on file.    Josette Sarmiento MD  [facility]    Mando Cintron is a 57 year old, presenting for the following health issues:  Recheck Medication      History of Present Illness       Reason for visit:  Med Check    He eats 2-3 servings of fruits and vegetables daily.He consumes 1 sweetened beverage(s) daily.He exercises with enough effort to increase his heart rate 10 to 19 minutes per day.  He exercises with enough effort to increase his heart rate 3 or less days per week.   He is taking medications regularly.     Patient has history of DVT.  He needs to be on lifelong.  Management per [facility].  Denies any unusual bleeding or bruising concerns.  He has been compliant with medication use.  No recurrent DVTs.  He tells that he is process of changing his insurance therefore does not want to come in for any blood work for screening labs  at this time.  He plans to get his flu shot done soon.  He did get COVID infection earlier this month.          Review of Systems   CONSTITUTIONAL: NEGATIVE for fever, chills, change in weight  ENT/MOUTH: NEGATIVE for ear, mouth and throat problems  RESP: NEGATIVE for significant cough or SOB  CV: NEGATIVE for chest pain, palpitations or peripheral edema      Objective           Vitals:  No  vitals were obtained today due to virtual visit.    Physical Exam   healthy, alert and no distress  PSYCH: Alert and oriented times 3; coherent speech, normal   rate and volume, able to articulate logical thoughts, able   to abstract reason, no tangential thoughts, no hallucinations   or delusions  His affect is normal  RESP: No cough, no audible wheezing, able to talk in full sentences  Remainder of exam unable to be completed due to telephone visits        Phone call duration: 9 minutes

## 2022-11-23 NOTE — PROGRESS NOTES
ANTICOAGULATION MANAGEMENT     Barry Herrera 57 year old male is on warfarin with therapeutic INR result. (Goal INR 2.0-3.0)    Recent labs: (last 7 days)     11/23/22  1150   INR 3.2*       ASSESSMENT       Source(s): Chart Review and Patient/Caregiver Call       Warfarin doses taken: Warfarin taken as instructed    Diet: Decreased greens/vitamin K in diet; plans to resume previous intake and Change in alcohol intake may be affecting INR. Increase intake yesterday - very seldom drinks     New illness, injury, or hospitalization: No    Medication/supplement changes: None noted    Signs or symptoms of bleeding or clotting: No    Previous INR: Therapeutic last 2(+) visits    Additional findings: None       PLAN     Recommended plan for temporary change(s) affecting INR     Dosing Instructions: Continue your current warfarin dose and increase green intake x 2 days with next INR in 2 weeks       Summary  As of 11/23/2022    Full warfarin instructions:  10 mg every Tue, Sat; 7.5 mg all other days; Starting 11/23/2022   Next INR check:  12/7/2022             Telephone call with Abdulaziz who verbalizes understanding and agrees to plan    Lab visit scheduled    Education provided:     Please call back if any changes to your diet, medications or how you've been taking warfarin    Symptom monitoring: monitoring for bleeding signs and symptoms and monitoring for clotting signs and symptoms    Plan made per [facility] anticoagulation protocol    Krystal Lira RN  Anticoagulation Clinic  11/23/2022    _______________________________________________________________________     Anticoagulation Episode Summary     Current INR goal:  2.0-3.0   TTR:  87.0 % (1 y)   Target end date:  Indefinite   Send INR reminders to:  [facility]    Indications    Long-term (current) use of anticoagulants [Z79.01] [Z79.01]  Deep vein thrombosis (DVT) of proximal vein of left lower extremity  unspecified chronicity (H) [I82.4Y2]            Comments:           Anticoagulation Care Providers     Provider Role Specialty Phone number    Josette Sarmiento MD Referring Family Medicine [phone]

## 2022-11-23 NOTE — PROGRESS NOTES
ANTICOAGULATION MANAGEMENT     Barry Herrera 57 year old male is on warfarin with supratherapeutic INR result. (Goal INR 2.0-3.0)    Recent labs: (last 7 days)     11/23/22  1150   INR 3.2*       ASSESSMENT       Source(s): Chart Review    Previous INR was Therapeutic last 2(+) visits    Medication, diet, health changes since last INR chart reviewed - COVID dx on 11/3 + 11/9 e-visit - reported worsening sinus sx    Per 11/17 encounter - patient is transferring to [facility] for care - patient will need to establish with their [facility] team       PLAN     Unable to reach Abdulaziz today.    Left message to continue current dose of warfarin 7.5 mg tonight and tomorrow (d/t holiday). Request call back for assessment.    Follow up required to assess for changes  and discuss out of range result     Krystal Lira RN  Anticoagulation Clinic  11/23/2022

## 2022-11-25 ENCOUNTER — IMMUNIZATION (OUTPATIENT)
Dept: NURSING | Facility: CLINIC | Age: 57
End: 2022-11-25
Payer: COMMERCIAL

## 2022-11-25 PROCEDURE — G0008 ADMIN INFLUENZA VIRUS VAC: HCPCS

## 2022-11-25 PROCEDURE — 90682 RIV4 VACC RECOMBINANT DNA IM: CPT

## 2022-11-29 NOTE — MR AVS SNAPSHOT
Barry BARRERA Javier   8/25/2017 8:15 AM   Anticoagulation Therapy Visit    Description:  51 year old male   Provider:   ANTICOAGULATION CLINIC   Department:  Ec Nurse           INR as of 8/25/2017     Today's INR 2.3      Anticoagulation Summary as of 8/25/2017     INR goal 2.0-3.0   Today's INR 2.3   Full instructions 7.5 mg every day   Next INR check 10/6/2017    Indications   Long-term (current) use of anticoagulants [Z79.01] [Z79.01]  DVT (deep venous thrombosis) (H) (Resolved) [I82.409]         Description     INR of 2.3 today.  Continue with maintenance dose of 7.5 mg daily = 52.5 mg weekly.  Recheck in 6 weeks.        Your next Anticoagulation Clinic appointment(s)     Oct 06, 2017  8:15 AM CDT   [place]  [place] CLINIC   [facility] [place] [place]    [address] [place] [id number] [phone]              Contact Numbers     Clinic Number:         August 2017 Details    Sun Mon Tue Wed Thu Fri Sat       1               2               3               4               5                 6               7               8               9               10               11               12                 13               14               15               16               17               18               19                 20               21               22               23               24               25      7.5 mg   See details      26      7.5 mg           27      7.5 mg         28      7.5 mg         29      7.5 mg         30      7.5 mg         31      7.5 mg            Date Details   08/25 This INR check               How to take your warfarin dose     To take:  7.5 mg Take 1.5 of the 5 mg tablets.           September 2017 Details    Sun Mon Tue Wed Thu Fri Sat          1      7.5 mg         2      7.5 mg           3      7.5 mg         4      7.5 mg         5      7.5 mg         6      7.5 mg         7      7.5  mg         8      7.5 mg         9      7.5 mg           10      7.5 mg         11      7.5 mg         12      7.5 mg         13      7.5 mg         14      7.5 mg         15      7.5 mg         16      7.5 mg           17      7.5 mg         18      7.5 mg         19      7.5 mg         20      7.5 mg         21      7.5 mg         22      7.5 mg         23      7.5 mg           24      7.5 mg         25      7.5 mg         26      7.5 mg         27      7.5 mg         28      7.5 mg         29      7.5 mg         30      7.5 mg          Date Details   No additional details            How to take your warfarin dose     To take:  7.5 mg Take 1.5 of the 5 mg tablets.           October 2017 Details    Sun Mon Tue Wed Thu Fri Sat     1      7.5 mg         2      7.5 mg         3      7.5 mg         4      7.5 mg         5      7.5 mg         6            7                 8               9               10               11               12               13               14                 15               16               17               18               19               20               21                 22               23               24               25               26               27               28                 29               30               31                    Date Details   No additional details    Date of next INR:  10/6/2017         How to take your warfarin dose     To take:  7.5 mg Take 1.5 of the 5 mg tablets.            Statement Selected

## 2022-12-07 ENCOUNTER — ANTICOAGULATION THERAPY VISIT (OUTPATIENT)
Dept: ANTICOAGULATION | Facility: CLINIC | Age: 57
End: 2022-12-07

## 2022-12-07 ENCOUNTER — LAB (OUTPATIENT)
Dept: LAB | Facility: CLINIC | Age: 57
End: 2022-12-07
Payer: COMMERCIAL

## 2022-12-07 DIAGNOSIS — Z79.01 LONG TERM CURRENT USE OF ANTICOAGULANT THERAPY: Primary | ICD-10-CM

## 2022-12-07 DIAGNOSIS — I82.4Y2 DEEP VEIN THROMBOSIS (DVT) OF PROXIMAL VEIN OF LEFT LOWER EXTREMITY, UNSPECIFIED CHRONICITY (H): ICD-10-CM

## 2022-12-07 DIAGNOSIS — Z79.01 LONG TERM CURRENT USE OF ANTICOAGULANT THERAPY: ICD-10-CM

## 2022-12-07 LAB — INR BLD: 2.8 (ref 0.9–1.1)

## 2022-12-07 PROCEDURE — 85610 PROTHROMBIN TIME: CPT

## 2022-12-07 PROCEDURE — 36416 COLLJ CAPILLARY BLOOD SPEC: CPT

## 2022-12-07 NOTE — PROGRESS NOTES
ANTICOAGULATION MANAGEMENT     Barry Herrera 57 year old male is on warfarin with therapeutic INR result. (Goal INR 2.0-3.0)    Recent labs: (last 7 days)     12/07/22  1151   INR 2.8*       ASSESSMENT       Source(s): Chart Review and Patient/Caregiver Call       Warfarin doses taken: Warfarin taken as instructed    Diet: No new diet changes identified    New illness, injury, or hospitalization: No    Medication/supplement changes: None noted    Signs or symptoms of bleeding or clotting: No    Previous INR: Supratherapeutic d/t less veggies and had 2 beers the prior night. Otherwise, very stable and therapeutic on this dose.    Additional findings: None       PLAN     Recommended plan for no diet, medication or health factor changes affecting INR     Dosing Instructions: Continue your current warfarin dose with next INR in 6 weeks       Summary  As of 12/7/2022    Full warfarin instructions:  10 mg every Tue, Sat; 7.5 mg all other days; Starting 12/7/2022   Next INR check:  1/18/2023             Telephone call with Abdulaziz who verbalizes understanding and agrees to plan    Lab visit scheduled    Education provided:     Please call back if any changes to your diet, medications or how you've been taking warfarin    Contact [phone]  with any changes, questions or concerns.     Plan made per [facility] anticoagulation protocol    Debbie Rodriguez RN  Anticoagulation Clinic  12/7/2022    _______________________________________________________________________     Anticoagulation Episode Summary     Current INR goal:  2.0-3.0   TTR:  85.0 % (1 y)   Target end date:  Indefinite   Send INR reminders to:  [facility]    Indications    Long-term (current) use of anticoagulants [Z79.01] [Z79.01]  Deep vein thrombosis (DVT) of proximal vein of left lower extremity  unspecified chronicity (H) [I82.4Y2]           Comments:           Anticoagulation Care Providers     Provider Role Specialty Phone number    Josette Sarmiento  MD [facility] [phone]

## 2023-01-18 ENCOUNTER — ANTICOAGULATION THERAPY VISIT (OUTPATIENT)
Dept: ANTICOAGULATION | Facility: CLINIC | Age: 58
End: 2023-01-18

## 2023-01-18 ENCOUNTER — LAB (OUTPATIENT)
Dept: LAB | Facility: CLINIC | Age: 58
End: 2023-01-18
Payer: COMMERCIAL

## 2023-01-18 DIAGNOSIS — I82.4Y2 DEEP VEIN THROMBOSIS (DVT) OF PROXIMAL VEIN OF LEFT LOWER EXTREMITY, UNSPECIFIED CHRONICITY (H): ICD-10-CM

## 2023-01-18 DIAGNOSIS — Z79.01 LONG TERM CURRENT USE OF ANTICOAGULANT THERAPY: Primary | ICD-10-CM

## 2023-01-18 DIAGNOSIS — Z79.01 LONG TERM CURRENT USE OF ANTICOAGULANT THERAPY: ICD-10-CM

## 2023-01-18 LAB — INR BLD: 2.4 (ref 0.9–1.1)

## 2023-01-18 PROCEDURE — 85610 PROTHROMBIN TIME: CPT

## 2023-01-18 PROCEDURE — 36416 COLLJ CAPILLARY BLOOD SPEC: CPT

## 2023-01-18 NOTE — PROGRESS NOTES
ANTICOAGULATION MANAGEMENT     Barry Herrera 57 year old male is on warfarin with therapeutic INR result. (Goal INR 2.0-3.0)    Recent labs: (last 7 days)     01/18/23  1152   INR 2.4*       ASSESSMENT       Source(s): Chart Review and Patient/Caregiver Call       Warfarin doses taken: Warfarin taken as instructed    Diet: No new diet changes identified    New illness, injury, or hospitalization: No    Medication/supplement changes: None noted    Signs or symptoms of bleeding or clotting: No    Previous INR: Therapeutic last visit; previously outside of goal range    Additional findings: None       PLAN     Recommended plan for no diet, medication or health factor changes affecting INR     Dosing Instructions: Continue your current warfarin dose with next INR in 6 weeks       Summary  As of 1/18/2023    Full warfarin instructions:  10 mg every Tue, Sat; 7.5 mg all other days   Next INR check:  3/1/2023             Telephone call with Abdulaziz who verbalizes understanding and agrees to plan    Lab visit scheduled    Education provided:     Please call back if any changes to your diet, medications or how you've been taking warfarin    Contact [phone]  with any changes, questions or concerns.     Plan made per [facility] anticoagulation protocol    Debbie Rodriguez RN  Anticoagulation Clinic  1/18/2023    _______________________________________________________________________     Anticoagulation Episode Summary     Current INR goal:  2.0-3.0   TTR:  85.1 % (1 y)   Target end date:  Indefinite   Send INR reminders to:  [facility]    Indications    Long-term (current) use of anticoagulants [Z79.01] [Z79.01]  Deep vein thrombosis (DVT) of proximal vein of left lower extremity  unspecified chronicity (H) [I82.4Y2]           Comments:           Anticoagulation Care Providers     Provider Role Specialty Phone number    Josette Sarmiento MD Referring Family Medicine [phone]

## 2023-11-25 ENCOUNTER — HEALTH MAINTENANCE LETTER (OUTPATIENT)
Age: 58
End: 2023-11-25

## 2024-05-14 ENCOUNTER — TRANSFERRED RECORDS (OUTPATIENT)
Dept: HEALTH INFORMATION MANAGEMENT | Facility: CLINIC | Age: 59
End: 2024-05-14
Payer: COMMERCIAL

## 2024-08-27 ENCOUNTER — TRANSFERRED RECORDS (OUTPATIENT)
Dept: HEALTH INFORMATION MANAGEMENT | Facility: CLINIC | Age: 59
End: 2024-08-27
Payer: COMMERCIAL

## 2025-01-04 ENCOUNTER — HEALTH MAINTENANCE LETTER (OUTPATIENT)
Age: 60
End: 2025-01-04
